# Patient Record
Sex: MALE | Race: WHITE | Employment: UNEMPLOYED | URBAN - METROPOLITAN AREA
[De-identification: names, ages, dates, MRNs, and addresses within clinical notes are randomized per-mention and may not be internally consistent; named-entity substitution may affect disease eponyms.]

---

## 2017-03-14 ENCOUNTER — HOSPITAL ENCOUNTER (EMERGENCY)
Age: 57
Discharge: HOME | End: 2017-03-14
Attending: EMERGENCY MEDICINE | Admitting: EMERGENCY MEDICINE
Payer: SELF-PAY

## 2017-03-14 ENCOUNTER — APPOINTMENT (OUTPATIENT)
Dept: GENERAL RADIOLOGY | Age: 57
End: 2017-03-14
Attending: STUDENT IN AN ORGANIZED HEALTH CARE EDUCATION/TRAINING PROGRAM
Payer: SELF-PAY

## 2017-03-14 VITALS
WEIGHT: 231 LBS | DIASTOLIC BLOOD PRESSURE: 95 MMHG | OXYGEN SATURATION: 94 % | SYSTOLIC BLOOD PRESSURE: 160 MMHG | TEMPERATURE: 97.8 F | HEIGHT: 72 IN | BODY MASS INDEX: 31.29 KG/M2 | RESPIRATION RATE: 16 BRPM | HEART RATE: 82 BPM

## 2017-03-14 DIAGNOSIS — S29.9XXA CHEST WALL INJURY, INITIAL ENCOUNTER: Primary | ICD-10-CM

## 2017-03-14 PROCEDURE — 99282 EMERGENCY DEPT VISIT SF MDM: CPT

## 2017-03-14 PROCEDURE — 71101 X-RAY EXAM UNILAT RIBS/CHEST: CPT

## 2017-03-14 RX ORDER — OXYCODONE AND ACETAMINOPHEN 5; 325 MG/1; MG/1
1-2 TABLET ORAL
Qty: 20 TAB | Refills: 0 | Status: SHIPPED | OUTPATIENT
Start: 2017-03-14 | End: 2017-09-05
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
## 2017-03-14 NOTE — ED NOTES
Pt given discharge instructions, patient education, prescriptions, and follow up information. Pt states understanding. All questions answered. Pt discharged to home in private vehicle, ambulatory. Pt A/Ox4, RA, pain controlled. Pt given IS, pt performed return demonstration properly.
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
## 2017-03-22 ENCOUNTER — APPOINTMENT (OUTPATIENT)
Dept: GENERAL RADIOLOGY | Age: 57
End: 2017-03-22
Attending: EMERGENCY MEDICINE
Payer: SELF-PAY

## 2017-03-22 ENCOUNTER — HOSPITAL ENCOUNTER (EMERGENCY)
Age: 57
Discharge: HOME | End: 2017-03-22
Attending: EMERGENCY MEDICINE | Admitting: EMERGENCY MEDICINE
Payer: SELF-PAY

## 2017-03-22 VITALS — OXYGEN SATURATION: 95 % | SYSTOLIC BLOOD PRESSURE: 131 MMHG | DIASTOLIC BLOOD PRESSURE: 71 MMHG

## 2017-03-22 DIAGNOSIS — S22.31XA CLOSED FRACTURE OF RIB OF RIGHT SIDE, INITIAL ENCOUNTER: Primary | ICD-10-CM

## 2017-03-22 PROCEDURE — 71020 XR CHEST PA LAT: CPT

## 2017-03-22 PROCEDURE — 99282 EMERGENCY DEPT VISIT SF MDM: CPT

## 2017-03-22 PROCEDURE — 94762 N-INVAS EAR/PLS OXIMTRY CONT: CPT
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
## 2017-03-22 NOTE — DISCHARGE INSTRUCTIONS
We hope that we have addressed all of your medical concerns. The examination and treatment you received in the Emergency Department were for an emergent problem and were not intended as complete care. It is important that you follow up with your healthcare provider(s) for ongoing care. If your symptoms worsen or do not improve as expected, and you are unable to reach your usual health care provider(s), you should return to the Emergency Department. Today's healthcare is undergoing tremendous change, and patient satisfaction surveys are one of the many tools to assess the quality of medical care. You may receive a survey from the [employer] regarding your experience in the Emergency Department. I hope that your experience has been completely positive, particularly the medical care that I provided. As such, please participate in the survey; anything less than excellent does not meet my expectations or intentions. [facility] and [address] [place] [address] participate in nationally recognized quality of care measures. If your blood pressure is greater than 120/80, as reported below, we urge that you seek medical care to address the potential of high blood pressure, commonly known as hypertension. Hypertension can be hereditary or can be caused by certain medical conditions, pain, stress, or \"white coat syndrome. \"       Please make an appointment with your health care provider(s) for follow up of your Emergency Department visit. VITALS:   No data found. Thank you for allowing us to provide you with medical care today. We realize that you have many choices for your emergency care needs. Please choose us in the future for any continued health care needs.       MD YSABEL Nassar [address]: [phone]            No results found for this or any previous visit (from the past 24 hour(s)). Xr Chest Pa Lat    Result Date: 3/22/2017  INDICATION: pain to right chest wall EXAM: CXR 2 View FINDINGS: Frontal and lateral views of the chest show slightly displaced fractures posteriorly of right ribs 4 and 5 more apparent probably because of displacement on the current study compared with 3/14/2017. Lungs show no contusion or infiltrate. Heart size is normal. There is no pulmonary edema. There is no evident pneumothorax, adenopathy or effusion. IMPRESSION: Right rib fractures.

## 2017-03-22 NOTE — ED NOTES
PT GIVEN COPY OF DISCHARGE INSTRUCTIONS, PT VERBALIZED UNDERSTANDING, QUESTIONS ANSWERED, DISCHARGED IN STABLE CONDITION

## 2017-08-26 ENCOUNTER — APPOINTMENT (OUTPATIENT)
Dept: GENERAL RADIOLOGY | Age: 57
End: 2017-08-26
Attending: PHYSICIAN ASSISTANT
Payer: SELF-PAY

## 2017-08-26 ENCOUNTER — HOSPITAL ENCOUNTER (EMERGENCY)
Age: 57
Discharge: HOME | End: 2017-08-26
Attending: EMERGENCY MEDICINE | Admitting: EMERGENCY MEDICINE
Payer: SELF-PAY

## 2017-08-26 VITALS
SYSTOLIC BLOOD PRESSURE: 127 MMHG | TEMPERATURE: 98.3 F | HEART RATE: 87 BPM | OXYGEN SATURATION: 91 % | RESPIRATION RATE: 18 BRPM | DIASTOLIC BLOOD PRESSURE: 71 MMHG

## 2017-08-26 DIAGNOSIS — S81.811A LEG LACERATION, RIGHT, INITIAL ENCOUNTER: Primary | ICD-10-CM

## 2017-08-26 PROCEDURE — 73590 X-RAY EXAM OF LOWER LEG: CPT

## 2017-08-26 PROCEDURE — 99282 EMERGENCY DEPT VISIT SF MDM: CPT

## 2017-08-26 RX ORDER — LIDOCAINE HYDROCHLORIDE 20 MG/ML
10 INJECTION, SOLUTION INFILTRATION; PERINEURAL
Status: DISCONTINUED | OUTPATIENT
Start: 2017-08-26 | End: 2017-08-26 | Stop reason: HOSPADM

## 2017-08-26 RX ORDER — BACITRACIN 500 UNIT/G
1 PACKET (EA) TOPICAL
Status: DISCONTINUED | OUTPATIENT
Start: 2017-08-26 | End: 2017-08-26 | Stop reason: HOSPADM

## 2017-08-26 RX ORDER — LIDOCAINE HYDROCHLORIDE AND EPINEPHRINE 20; 10 MG/ML; UG/ML
10 INJECTION, SOLUTION INFILTRATION; PERINEURAL
Status: DISCONTINUED | OUTPATIENT
Start: 2017-08-26 | End: 2017-08-26 | Stop reason: HOSPADM

## 2017-08-26 NOTE — ED NOTES
Pt d/c home, bacitracin, and wound dressing, cobain placed on pt prior to discharge. nad noted. Pt able to ambulate with no difficulties and verbalized understanding of d/c instructions.

## 2017-08-26 NOTE — ED PROVIDER NOTES
HPI Comments: 60 y.o. male with no significant past medical history who presents from home via bike with chief complaint of laceration. Pt reports awaking shortly prior to arrival after having a nightmare at which time he ambulated and fell on to a piece of glass. Pt sustained laceration to his right shin. He applied pressure and wrapped site but was unable to control the bleeding. Pt was indoors at time of injury and assumed glass was a picture frame. Pt denies the use of blood thinners. NKDA. No fever, cough, congestion, nausea, vomiting, diarrhea, constipation, chest pain, SOB or any other acute medical concerns at this time. PCP: Michelle Jimenez MD    Note written by Mara Anderson, as dictated by JUDIE Davis  4:05 AM    The history is provided by the patient. No  was used. Past Medical History:   Diagnosis Date    Knee pain        Past Surgical History:   Procedure Laterality Date    HX GI      hernia repair    HX HEENT      tonsils removed    HX ORTHOPAEDIC      right knee surgery         History reviewed. No pertinent family history. Social History     Social History    Marital status: SINGLE     Spouse name: N/A    Number of children: N/A    Years of education: N/A     Occupational History    Not on file. Social History Main Topics    Smoking status: Current Every Day Smoker     Packs/day: 0.50    Smokeless tobacco: Not on file    Alcohol use Yes      Comment: occ    Drug use: No    Sexual activity: Not on file     Other Topics Concern    Not on file     Social History Narrative         ALLERGIES: Review of patient's allergies indicates no known allergies. Review of Systems   Constitutional: Negative for fever. HENT: Negative for congestion and sore throat. Eyes: Negative for photophobia. Respiratory: Negative for cough and shortness of breath. Cardiovascular: Negative for chest pain and leg swelling.    Gastrointestinal: Negative for abdominal pain, constipation, diarrhea, nausea and vomiting. Genitourinary: Negative for difficulty urinating, dysuria and hematuria. Musculoskeletal: Negative for back pain and neck pain. Skin: Positive for wound (right shin). Negative for rash. Neurological: Negative for dizziness, weakness, numbness and headaches. All other systems reviewed and are negative. Vitals:    08/26/17 0342   BP: 128/76   Pulse: 87   Resp: 18   Temp: 98.3 °F (36.8 °C)   SpO2: 99%            Physical Exam   Constitutional: He is oriented to person, place, and time. He appears well-developed and well-nourished. No distress. Pleasant  male in NAD   HENT:   Head: Normocephalic and atraumatic. Right Ear: External ear normal.   Left Ear: External ear normal.   Nose: Nose normal.   Mouth/Throat: Oropharynx is clear and moist. No oropharyngeal exudate. Eyes: Conjunctivae and EOM are normal. Pupils are equal, round, and reactive to light. Right eye exhibits no discharge. Left eye exhibits no discharge. Neck: Normal range of motion. Neck supple. Cardiovascular: Normal rate, regular rhythm and normal heart sounds. Pulmonary/Chest: Effort normal and breath sounds normal. He has no wheezes. He has no rales. Abdominal: Soft. Bowel sounds are normal. He exhibits no distension. There is no tenderness. There is no guarding. Musculoskeletal: Normal range of motion. Legs:  Lymphadenopathy:     He has no cervical adenopathy. Neurological: He is alert and oriented to person, place, and time. No cranial nerve deficit. Skin: Skin is warm and dry. He is not diaphoretic. Psychiatric: He has a normal mood and affect. His behavior is normal.   Nursing note and vitals reviewed. MDM  Number of Diagnoses or Management Options  Diagnosis management comments: 61 yo  male with leg laceration  Plan  Wound repair.  Robyn ARITA [place], [place]         Amount and/or Complexity of Data Reviewed  Tests in the radiology section of CPT®: ordered and reviewed  Independent visualization of images, tracings, or specimens: yes      ED Course       Procedures           Progress note    Imaging reviewed. Claudia Mo    Patient's results have been reviewed with them. Patient and/or family have verbally conveyed their understanding and agreement of the patient's signs, symptoms, diagnosis, treatment and prognosis and additionally agree to follow up as recommended or return to the Emergency Room should their condition change prior to follow-up. Discharge instructions have also been provided to the patient with some educational information regarding their diagnosis as well a list of reasons why they would want to return to the ER prior to their follow-up appointment should their condition change. Claudia Mo    A/P  Leg laceration: APPLY ICE FOR PAIN/SWELLING. APPLY ANTIBIOTIC OINTMENT 2-3 X DAY. REMOVAL IN 10 DAYS. FOLLOW UP IF ANY REDNESS/SWELLING/DRAINAGE OR SIGNS OF INFECTION.  Claudia Huynh

## 2017-08-26 NOTE — DISCHARGE INSTRUCTIONS
We hope that we have addressed all of your medical concerns. The examination and treatment you received in the Emergency Department were for an emergent problem and were not intended as complete care. It is important that you follow up with your healthcare provider(s) for ongoing care. If your symptoms worsen or do not improve as expected, and you are unable to reach your usual health care provider(s), you should return to the Emergency Department. Today's healthcare is undergoing tremendous change, and patient satisfaction surveys are one of the many tools to assess the quality of medical care. You may receive a survey from the [employer] organization regarding your experience in the Emergency Department. I hope that your experience has been completely positive, particularly the medical care that I provided. As such, please participate in the survey; anything less than excellent does not meet my expectations or intentions. [facility] and [facility] participate in nationally recognized quality of care measures. If your blood pressure is greater than 120/80, as reported below, we urge that you seek medical care to address the potential of high blood pressure, commonly known as hypertension. Hypertension can be hereditary or can be caused by certain medical conditions, pain, stress, or \"white coat syndrome. \"       Please make an appointment with your health care provider(s) for follow up of your Emergency Department visit. VITALS:   Patient Vitals for the past 8 hrs:   Temp Pulse Resp BP SpO2   08/26/17 0342 98.3 °F (36.8 °C) 87 18 128/76 99 %          Thank you for allowing us to provide you with medical care today. We realize that you have many choices for your emergency care needs. Please choose us in the future for any continued health care needs. Regards,           April NENO Barrera, [facility]. Office: [phone]            No results found for this or any previous visit (from the past 24 hour(s)). Xr Tib/fib Rt    Result Date: 8/26/2017  EXAM:  XR TIB/FIB RT INDICATION:  Right lower leg laceration. Evaluate for radiopaque foreign body. COMPARISON: Right knee radiographs 12/21/2016. FINDINGS: AP and lateral  views of the right tibia and fibula demonstrate no fracture or other acute osseous, articular or soft tissue abnormality. There is no radiopaque foreign body. IMPRESSION: No radiopaque foreign body or other acute abnormality. Cuts: Care Instructions  Your Care Instructions  A cut can happen anywhere on your body. Stitches, staples, skin adhesives, or pieces of tape called Steri-Strips are sometimes used to keep the edges of a cut together and help it heal. Steri-Strips can be used by themselves or with stitches or staples. Sometimes cuts are left open. If the cut went deep and through the skin, the doctor may have closed the cut in two layers. A deeper layer of stitches brings the deep part of the cut together. These stitches will dissolve and don't need to be removed. The upper layer closure, which could be stitches, staples, Steri-Strips, or adhesive, is what you see on the cut. A cut is often covered by a bandage. The doctor has checked you carefully, but problems can develop later. If you notice any problems or new symptoms, get medical treatment right away. Follow-up care is a key part of your treatment and safety. Be sure to make and go to all appointments, and call your doctor if you are having problems. It's also a good idea to know your test results and keep a list of the medicines you take. How can you care for yourself at home? If a cut is open or closed  · Prop up the sore area on a pillow anytime you sit or lie down during the next 3 days. Try to keep it above the level of your heart. This will help reduce swelling.   · Keep the cut dry for the first 24 to 48 hours. After this, you can shower if your doctor okays it. Pat the cut dry. · Don't soak the cut, such as in a bathtub. Your doctor will tell you when it's safe to get the cut wet. · After the first 24 to 48 hours, clean the cut with soap and water 2 times a day unless your doctor gives you different instructions. ¨ Don't use hydrogen peroxide or alcohol, which can slow healing. ¨ You may cover the cut with a thin layer of petroleum jelly and a nonstick bandage. ¨ If the doctor put a bandage over the cut, put on a new bandage after cleaning the cut or if the bandage gets wet or dirty. · Avoid any activity that could cause your cut to reopen. · Be safe with medicines. Read and follow all instructions on the label. ¨ If the doctor gave you a prescription medicine for pain, take it as prescribed. ¨ If you are not taking a prescription pain medicine, ask your doctor if you can take an over-the-counter medicine. If the cut is closed with stitches, staples, or Steri-Strips  · Follow the above instructions for open or closed cuts. · Do not remove the stitches or staples on your own. Your doctor will tell you when to come back to have the stitches or staples removed. · Leave Steri-Strips on until they fall off. If the cut is closed with a skin adhesive  · Follow the above instructions for open or closed cuts. · Leave the skin adhesive on your skin until it falls off on its own. This may take 5 to 10 days. · Do not scratch, rub, or pick at the adhesive. · Do not put the sticky part of a bandage directly on the adhesive. · Do not put any kind of ointment, cream, or lotion over the area. This can make the adhesive fall off too soon. Do not use hydrogen peroxide or alcohol, which can slow healing. When should you call for help? Call your doctor now or seek immediate medical care if:  · You have new pain, or your pain gets worse. · The skin near the cut is cold or pale or changes color.   · You have tingling, weakness, or numbness near the cut. · The cut starts to bleed, and blood soaks through the bandage. Oozing small amounts of blood is normal.  · You have trouble moving the area near the cut. · You have symptoms of infection, such as:  ¨ Increased pain, swelling, warmth, or redness around the cut. ¨ Red streaks leading from the cut. ¨ Pus draining from the cut. ¨ A fever. Watch closely for changes in your health, and be sure to contact your doctor if:  · The cut reopens. · You do not get better as expected. Where can you learn more? Go to http://leonela-isaiah.info/. Enter M735 in the search box to learn more about \"Cuts: Care Instructions. \"  Current as of: March 20, 2017  Content Version: 11.3  © 4383-3278 [employer]. Care instructions adapted under license by [employer] (which disclaims liability or warranty for this information). If you have questions about a medical condition or this instruction, always ask your healthcare professional. [place] [address] any warranty or liability for your use of this information.

## 2017-08-26 NOTE — ED TRIAGE NOTES
Triage: Patient reports sleep walking during nightmare and falling and cutting right leg on glass. Last tetanus 1 year ago approx. Bleeding controlled upon triage with duct tape and gauze. Not anticoagulated.

## 2017-09-05 ENCOUNTER — HOSPITAL ENCOUNTER (EMERGENCY)
Age: 57
Discharge: HOME | End: 2017-09-05
Attending: EMERGENCY MEDICINE | Admitting: EMERGENCY MEDICINE
Payer: SELF-PAY

## 2017-09-05 VITALS
BODY MASS INDEX: 30.51 KG/M2 | RESPIRATION RATE: 16 BRPM | DIASTOLIC BLOOD PRESSURE: 90 MMHG | HEIGHT: 72 IN | TEMPERATURE: 98 F | SYSTOLIC BLOOD PRESSURE: 140 MMHG | WEIGHT: 225.25 LBS | HEART RATE: 86 BPM

## 2017-09-05 DIAGNOSIS — Z48.02 VISIT FOR SUTURE REMOVAL: Primary | ICD-10-CM

## 2017-09-05 PROCEDURE — 74011000250 HC RX REV CODE- 250: Performed by: PHYSICIAN ASSISTANT

## 2017-09-05 PROCEDURE — 75810000275 HC EMERGENCY DEPT VISIT NO LEVEL OF CARE

## 2017-09-05 RX ORDER — BACITRACIN 500 UNIT/G
1 PACKET (EA) TOPICAL ONCE
Status: COMPLETED | OUTPATIENT
Start: 2017-09-05 | End: 2017-09-05

## 2017-09-05 RX ORDER — BACITRACIN 500 UNIT/G
1 PACKET (EA) TOPICAL 3 TIMES DAILY
Status: DISCONTINUED | OUTPATIENT
Start: 2017-09-05 | End: 2017-09-05

## 2017-09-05 RX ADMIN — BACITRACIN 1 PACKET: 500 OINTMENT TOPICAL at 01:40

## 2017-09-05 NOTE — ED NOTES
Bacitracin and Band Aid applied. Pt provided with discharge instructions. Verbalizes understanding.  Left ambulatory with steady gait, all belongings, and stable VS.

## 2017-09-05 NOTE — DISCHARGE INSTRUCTIONS
Learning About Stitches and Staples Removal  When are stitches and staples removed? Your doctor will tell you when to have your stitches or staples removed, usually in 7 to 14 days. How long you'll be told to wait will depend on things like where the wound is located, how big and how deep the wound is, and what your general health is like. Do not remove the stitches on your own. Stitches on the face are usually removed within a week. But stitches and staples on other areas of the body, such as on the back or belly or over a joint, may need to stay in place longer, often a week or two. Be sure to follow your doctor's instructions. How are stitches and staples removed? It usually doesn't hurt when the doctor removes the stitches or staples. You may feel a tug as each stitch or staple is removed. · You will either be seated or lying down. · To remove stitches, the doctor will use scissors to cut each of the knots and then pull the threads out. · To remove staples, the doctor will use a tool to take out the staples one at a time. · The area may still feel tender after the stitches or staples are gone. But it should feel better within a few minutes or up to a few hours. What can you expect after stitches and staples are removed? Depending on the type and location of the cut, you will have a scar. Scars usually fade over time. Keep the area clean, but you won't need a bandage. When should you call for help? Call your doctor now or seek immediate medical care if:  · You have new pain, or your pain gets worse. · You have trouble moving the area near the scar. · You have symptoms of infection, such as:  ¨ Increased pain, swelling, warmth, or redness around the scar. ¨ Red streaks leading from the scar. ¨ Pus draining from the scar. ¨ A fever. Watch closely for changes in your health, and be sure to contact your doctor if:  · The scar opens. · You do not get better as expected.   Follow-up care is a key part of your treatment and safety. Be sure to make and go to all appointments, and call your doctor if you do not get better as expected. It's also a good idea to keep a list of the medicines you take. Where can you learn more? Go to http://leonela-isaiah.info/. Enter Z141 in the search box to learn more about \"Learning About Stitches and Staples Removal.\"  Current as of: March 20, 2017  Content Version: 11.3  © 1870-5946 [employer]. Care instructions adapted under license by [employer] (which disclaims liability or warranty for this information). If you have questions about a medical condition or this instruction, always ask your healthcare professional. [address] any warranty or liability for your use of this information. We hope that we have addressed all of your medical concerns. The examination and treatment you received in the Emergency Department were for an emergent problem and were not intended as complete care. It is important that you follow up with your healthcare provider(s) for ongoing care. If your symptoms worsen or do not improve as expected, and you are unable to reach your usual health care provider(s), you should return to the Emergency Department. Today's healthcare is undergoing tremendous change, and patient satisfaction surveys are one of the many tools to assess the quality of medical care. You may receive a survey from the [employer] regarding your experience in the Emergency Department. I hope that your experience has been completely positive, particularly the medical care that I provided. As such, please participate in the survey; anything less than excellent does not meet my expectations or intentions. [facility] and [facility] participate in nationally recognized quality of care measures.   If your blood pressure is greater than 120/80, as reported below, we urge that you seek medical care to address the potential of high blood pressure, commonly known as hypertension. Hypertension can be hereditary or can be caused by certain medical conditions, pain, stress, or \"white coat syndrome. \"       Please make an appointment with your health care provider(s) for follow up of your Emergency Department visit. VITALS:   Patient Vitals for the past 8 hrs:   Temp Pulse Resp BP   09/05/17 0111 98 °F (36.7 °C) 86 16 140/90          Thank you for allowing us to provide you with medical care today. We realize that you have many choices for your emergency care needs. Please choose us in the future for any continued health care needs. Delbert Barrett, [address] [place] [address].   Office: [phone]

## 2017-09-05 NOTE — ED PROVIDER NOTES
HPI Comments: 59 yo male here for suture removal.  States had sutures placed in ER 8/26; site healing well without complications. Denies drainage. Denies fever, chills, CP, SOB, abd pain, flank pain, urinary symptoms. +Smoker. Patient is a 60 y.o. male presenting with suture removal. The history is provided by the patient. Suture Removal   This is a new problem. Pertinent negatives include no chest pain, no abdominal pain, no headaches and no shortness of breath. Nothing aggravates the symptoms. He has tried nothing for the symptoms. Past Medical History:   Diagnosis Date    Knee pain        Past Surgical History:   Procedure Laterality Date    HX GI      hernia repair    HX HEENT      tonsils removed    HX ORTHOPAEDIC      right knee surgery         History reviewed. No pertinent family history. Social History     Social History    Marital status: SINGLE     Spouse name: N/A    Number of children: N/A    Years of education: N/A     Occupational History    Not on file. Social History Main Topics    Smoking status: Current Every Day Smoker     Packs/day: 0.50    Smokeless tobacco: Not on file    Alcohol use Yes      Comment: occ    Drug use: No    Sexual activity: Not on file     Other Topics Concern    Not on file     Social History Narrative         ALLERGIES: Review of patient's allergies indicates no known allergies. Review of Systems   Constitutional: Negative for activity change and fever. HENT: Negative for facial swelling. Eyes: Negative for discharge. Respiratory: Negative for cough and shortness of breath. Cardiovascular: Negative for chest pain and leg swelling. Gastrointestinal: Negative for abdominal distention and abdominal pain. Skin: Positive for wound. Negative for color change. Neurological: Negative for seizures, syncope and headaches. Psychiatric/Behavioral: Negative for behavioral problems.        Vitals:    09/05/17 0111   BP: 140/90 Pulse: 86   Resp: 16   Temp: 98 °F (36.7 °C)   Weight: 102.2 kg (225 lb 4 oz)   Height: 6' (1.829 m)            Physical Exam   Constitutional: He is oriented to person, place, and time. He appears well-nourished. No distress. HENT:   Head: Normocephalic and atraumatic. Eyes: Pupils are equal, round, and reactive to light. Neck: Normal range of motion. Cardiovascular: Normal rate and regular rhythm. Pulmonary/Chest: Effort normal and breath sounds normal.   Abdominal: Soft. There is no tenderness. Musculoskeletal: Normal range of motion. He exhibits no edema or tenderness. Neurological: He is alert and oriented to person, place, and time. Skin: Skin is warm and dry. Well healing wound to right lower leg; 10 sutures in place; no surrounding edema/erythema. Psychiatric: He has a normal mood and affect. Nursing note and vitals reviewed. [facility]  ED Course       Suture/Staple Removal  Date/Time: 9/5/2017 1:20 AM  Performed by: Criss Aguilera  Authorized by: Criss Aguilera     Consent:     Consent obtained:  Verbal    Consent given by:  Patient    Risks discussed:  Pain, wound separation and bleeding    Alternatives discussed:  No treatment and delayed treatment  Location:     Location:  Lower extremity    Lower extremity location:  Leg    Leg location:  R lower leg  Procedure details:     Wound appearance:  Good wound healing, clean, nontender and nonpurulent    Number of sutures removed:  10  Post-procedure details:     Post-removal:  Antibiotic ointment applied and dressing applied    Patient tolerance of procedure: Tolerated well, no immediate complications        Patient's results have been reviewed with them. Patient and/or family have verbally conveyed their understanding and agreement of the patient's signs, symptoms, diagnosis, treatment and prognosis and additionally agree to follow up as recommended or return to the Emergency Room should their condition change prior to follow-up. Discharge instructions have also been provided to the patient with some educational information regarding their diagnosis as well a list of reasons why they would want to return to the ER prior to their follow-up appointment should their condition change.   JUDIE Ahn

## 2019-09-23 ENCOUNTER — HOSPICE ADMISSION (OUTPATIENT)
Dept: HOSPICE | Facility: HOSPICE | Age: 59
End: 2019-09-23

## 2019-11-12 ENCOUNTER — APPOINTMENT (OUTPATIENT)
Dept: CT IMAGING | Age: 59
DRG: 720 | End: 2019-11-12
Attending: EMERGENCY MEDICINE
Payer: MEDICAID

## 2019-11-12 ENCOUNTER — APPOINTMENT (OUTPATIENT)
Dept: GENERAL RADIOLOGY | Age: 59
DRG: 720 | End: 2019-11-12
Attending: EMERGENCY MEDICINE
Payer: MEDICAID

## 2019-11-12 ENCOUNTER — APPOINTMENT (OUTPATIENT)
Dept: ULTRASOUND IMAGING | Age: 59
DRG: 720 | End: 2019-11-12
Attending: FAMILY MEDICINE
Payer: MEDICAID

## 2019-11-12 ENCOUNTER — APPOINTMENT (OUTPATIENT)
Dept: CT IMAGING | Age: 59
DRG: 720 | End: 2019-11-12
Attending: FAMILY MEDICINE
Payer: MEDICAID

## 2019-11-12 ENCOUNTER — HOSPITAL ENCOUNTER (INPATIENT)
Age: 59
LOS: 11 days | Discharge: HOME | DRG: 720 | End: 2019-11-23
Attending: EMERGENCY MEDICINE | Admitting: FAMILY MEDICINE
Payer: MEDICAID

## 2019-11-12 DIAGNOSIS — I50.9 ACUTE CONGESTIVE HEART FAILURE, UNSPECIFIED HEART FAILURE TYPE (HCC): ICD-10-CM

## 2019-11-12 DIAGNOSIS — K75.0 LIVER ABSCESS: ICD-10-CM

## 2019-11-12 DIAGNOSIS — E87.1 HYPONATREMIA: Primary | ICD-10-CM

## 2019-11-12 DIAGNOSIS — R50.9 FEBRILE ILLNESS, ACUTE: ICD-10-CM

## 2019-11-12 LAB
ALBUMIN SERPL-MCNC: 2.6 G/DL (ref 3.5–5)
ALBUMIN/GLOB SERPL: 0.6 {RATIO} (ref 1.1–2.2)
ALP SERPL-CCNC: 161 U/L (ref 45–117)
ALT SERPL-CCNC: 147 U/L (ref 12–78)
ANION GAP SERPL CALC-SCNC: 6 MMOL/L (ref 5–15)
APAP SERPL-MCNC: 7 UG/ML (ref 10–30)
APPEARANCE UR: ABNORMAL
AST SERPL-CCNC: 117 U/L (ref 15–37)
BACTERIA URNS QL MICRO: NEGATIVE /HPF
BASOPHILS # BLD: 0 K/UL (ref 0–0.1)
BASOPHILS NFR BLD: 0 % (ref 0–1)
BILIRUB SERPL-MCNC: 1 MG/DL (ref 0.2–1)
BILIRUB UR QL CFM: NEGATIVE
BNP SERPL-MCNC: 1298 PG/ML
BUN SERPL-MCNC: 10 MG/DL (ref 6–20)
BUN/CREAT SERPL: 11 (ref 12–20)
CALCIUM SERPL-MCNC: 8 MG/DL (ref 8.5–10.1)
CHLORIDE SERPL-SCNC: 93 MMOL/L (ref 97–108)
CO2 SERPL-SCNC: 28 MMOL/L (ref 21–32)
COLOR UR: ABNORMAL
COMMENT, HOLDF: NORMAL
CREAT SERPL-MCNC: 0.87 MG/DL (ref 0.7–1.3)
DIFFERENTIAL METHOD BLD: ABNORMAL
EOSINOPHIL # BLD: 0 K/UL (ref 0–0.4)
EOSINOPHIL NFR BLD: 0 % (ref 0–7)
EPITH CASTS URNS QL MICRO: ABNORMAL /LPF
ERYTHROCYTE [DISTWIDTH] IN BLOOD BY AUTOMATED COUNT: 12.5 % (ref 11.5–14.5)
FLUAV AG NPH QL IA: NEGATIVE
FLUBV AG NOSE QL IA: NEGATIVE
GLOBULIN SER CALC-MCNC: 4.1 G/DL (ref 2–4)
GLUCOSE SERPL-MCNC: 136 MG/DL (ref 65–100)
GLUCOSE UR STRIP.AUTO-MCNC: 100 MG/DL
HAV IGM SER QL: NONREACTIVE
HBV CORE IGM SER QL: NONREACTIVE
HBV SURFACE AG SER QL: 0.52 INDEX
HBV SURFACE AG SER QL: NEGATIVE
HCT VFR BLD AUTO: 43 % (ref 36.6–50.3)
HCV AB SERPL QL IA: NONREACTIVE
HCV COMMENT,HCGAC: NORMAL
HGB BLD-MCNC: 14.6 G/DL (ref 12.1–17)
HGB UR QL STRIP: ABNORMAL
IMM GRANULOCYTES # BLD AUTO: 0.3 K/UL (ref 0–0.04)
IMM GRANULOCYTES NFR BLD AUTO: 2 % (ref 0–0.5)
INR PPP: 1.3 (ref 0.9–1.1)
KETONES UR QL STRIP.AUTO: ABNORMAL MG/DL
LACTATE BLD-SCNC: 1.42 MMOL/L (ref 0.4–2)
LEUKOCYTE ESTERASE UR QL STRIP.AUTO: NEGATIVE
LIPASE SERPL-CCNC: 85 U/L (ref 73–393)
LYMPHOCYTES # BLD: 0.5 K/UL (ref 0.8–3.5)
LYMPHOCYTES NFR BLD: 3 % (ref 12–49)
MCH RBC QN AUTO: 30.2 PG (ref 26–34)
MCHC RBC AUTO-ENTMCNC: 34 G/DL (ref 30–36.5)
MCV RBC AUTO: 89 FL (ref 80–99)
MONOCYTES # BLD: 1.3 K/UL (ref 0–1)
MONOCYTES NFR BLD: 8 % (ref 5–13)
NEUTS SEG # BLD: 14.5 K/UL (ref 1.8–8)
NEUTS SEG NFR BLD: 87 % (ref 32–75)
NITRITE UR QL STRIP.AUTO: NEGATIVE
NRBC # BLD: 0 K/UL (ref 0–0.01)
NRBC BLD-RTO: 0 PER 100 WBC
OSMOLALITY SERPL: 272 MOSM/KG H2O
PH UR STRIP: 6.5 [PH] (ref 5–8)
PLATELET # BLD AUTO: 161 K/UL (ref 150–400)
PMV BLD AUTO: 9.8 FL (ref 8.9–12.9)
POTASSIUM SERPL-SCNC: 3.4 MMOL/L (ref 3.5–5.1)
PROT SERPL-MCNC: 6.7 G/DL (ref 6.4–8.2)
PROT UR STRIP-MCNC: 100 MG/DL
PROTHROMBIN TIME: 12.9 SEC (ref 9–11.1)
RBC # BLD AUTO: 4.83 M/UL (ref 4.1–5.7)
RBC #/AREA URNS HPF: ABNORMAL /HPF (ref 0–5)
RBC MORPH BLD: ABNORMAL
SAMPLES BEING HELD,HOLD: NORMAL
SODIUM SERPL-SCNC: 127 MMOL/L (ref 136–145)
SP GR UR REFRACTOMETRY: 1.02 (ref 1–1.03)
SP1: NORMAL
SP2: NORMAL
SP3: NORMAL
T3FREE SERPL-MCNC: 1.3 PG/ML (ref 2.2–4)
T4 FREE SERPL-MCNC: 1.4 NG/DL (ref 0.8–1.5)
TROPONIN I SERPL-MCNC: <0.05 NG/ML
TROPONIN I SERPL-MCNC: <0.05 NG/ML
TSH SERPL DL<=0.05 MIU/L-ACNC: 1.16 UIU/ML (ref 0.36–3.74)
UR CULT HOLD, URHOLD: NORMAL
UROBILINOGEN UR QL STRIP.AUTO: 4 EU/DL (ref 0.2–1)
WBC # BLD AUTO: 16.6 K/UL (ref 4.1–11.1)
WBC URNS QL MICRO: ABNORMAL /HPF (ref 0–4)

## 2019-11-12 PROCEDURE — 80074 ACUTE HEPATITIS PANEL: CPT

## 2019-11-12 PROCEDURE — 74011250636 HC RX REV CODE- 250/636: Performed by: EMERGENCY MEDICINE

## 2019-11-12 PROCEDURE — 71250 CT THORAX DX C-: CPT

## 2019-11-12 PROCEDURE — 36415 COLL VENOUS BLD VENIPUNCTURE: CPT

## 2019-11-12 PROCEDURE — 96361 HYDRATE IV INFUSION ADD-ON: CPT

## 2019-11-12 PROCEDURE — 83605 ASSAY OF LACTIC ACID: CPT

## 2019-11-12 PROCEDURE — 81001 URINALYSIS AUTO W/SCOPE: CPT

## 2019-11-12 PROCEDURE — 84443 ASSAY THYROID STIM HORMONE: CPT

## 2019-11-12 PROCEDURE — 74176 CT ABD & PELVIS W/O CONTRAST: CPT

## 2019-11-12 PROCEDURE — 85025 COMPLETE CBC W/AUTO DIFF WBC: CPT

## 2019-11-12 PROCEDURE — 76705 ECHO EXAM OF ABDOMEN: CPT

## 2019-11-12 PROCEDURE — 80053 COMPREHEN METABOLIC PANEL: CPT

## 2019-11-12 PROCEDURE — 84481 FREE ASSAY (FT-3): CPT

## 2019-11-12 PROCEDURE — 83690 ASSAY OF LIPASE: CPT

## 2019-11-12 PROCEDURE — 87076 CULTURE ANAEROBE IDENT EACH: CPT

## 2019-11-12 PROCEDURE — 99285 EMERGENCY DEPT VISIT HI MDM: CPT

## 2019-11-12 PROCEDURE — 87040 BLOOD CULTURE FOR BACTERIA: CPT

## 2019-11-12 PROCEDURE — 87804 INFLUENZA ASSAY W/OPTIC: CPT

## 2019-11-12 PROCEDURE — 71046 X-RAY EXAM CHEST 2 VIEWS: CPT

## 2019-11-12 PROCEDURE — 74011250636 HC RX REV CODE- 250/636: Performed by: FAMILY MEDICINE

## 2019-11-12 PROCEDURE — 83880 ASSAY OF NATRIURETIC PEPTIDE: CPT

## 2019-11-12 PROCEDURE — 83930 ASSAY OF BLOOD OSMOLALITY: CPT

## 2019-11-12 PROCEDURE — 87449 NOS EACH ORGANISM AG IA: CPT

## 2019-11-12 PROCEDURE — 96360 HYDRATION IV INFUSION INIT: CPT

## 2019-11-12 PROCEDURE — 65660000000 HC RM CCU STEPDOWN

## 2019-11-12 PROCEDURE — 84484 ASSAY OF TROPONIN QUANT: CPT

## 2019-11-12 PROCEDURE — 84439 ASSAY OF FREE THYROXINE: CPT

## 2019-11-12 PROCEDURE — 80307 DRUG TEST PRSMV CHEM ANLYZR: CPT

## 2019-11-12 PROCEDURE — 74011250637 HC RX REV CODE- 250/637: Performed by: EMERGENCY MEDICINE

## 2019-11-12 PROCEDURE — 85610 PROTHROMBIN TIME: CPT

## 2019-11-12 RX ORDER — SODIUM CHLORIDE 0.9 % (FLUSH) 0.9 %
5-40 SYRINGE (ML) INJECTION AS NEEDED
Status: DISCONTINUED | OUTPATIENT
Start: 2019-11-12 | End: 2019-11-23 | Stop reason: HOSPADM

## 2019-11-12 RX ORDER — ACETAMINOPHEN 325 MG/1
650 TABLET ORAL
Status: CANCELLED | OUTPATIENT
Start: 2019-11-12

## 2019-11-12 RX ORDER — POTASSIUM CHLORIDE 14.9 MG/ML
10 INJECTION INTRAVENOUS ONCE
Status: COMPLETED | OUTPATIENT
Start: 2019-11-12 | End: 2019-11-12

## 2019-11-12 RX ORDER — SODIUM CHLORIDE 9 MG/ML
75 INJECTION, SOLUTION INTRAVENOUS CONTINUOUS
Status: DISCONTINUED | OUTPATIENT
Start: 2019-11-12 | End: 2019-11-15

## 2019-11-12 RX ORDER — SODIUM CHLORIDE 0.9 % (FLUSH) 0.9 %
5-40 SYRINGE (ML) INJECTION EVERY 8 HOURS
Status: DISCONTINUED | OUTPATIENT
Start: 2019-11-12 | End: 2019-11-23 | Stop reason: HOSPADM

## 2019-11-12 RX ORDER — HEPARIN SODIUM 5000 [USP'U]/ML
5000 INJECTION, SOLUTION INTRAVENOUS; SUBCUTANEOUS EVERY 8 HOURS
Status: DISCONTINUED | OUTPATIENT
Start: 2019-11-12 | End: 2019-11-13

## 2019-11-12 RX ORDER — ACETAMINOPHEN 500 MG
1000 TABLET ORAL ONCE
Status: COMPLETED | OUTPATIENT
Start: 2019-11-12 | End: 2019-11-12

## 2019-11-12 RX ADMIN — SODIUM CHLORIDE 1000 ML: 900 INJECTION, SOLUTION INTRAVENOUS at 13:10

## 2019-11-12 RX ADMIN — POTASSIUM CHLORIDE 10 MEQ: 200 INJECTION, SOLUTION INTRAVENOUS at 18:53

## 2019-11-12 RX ADMIN — SODIUM CHLORIDE 75 ML/HR: 900 INJECTION, SOLUTION INTRAVENOUS at 18:53

## 2019-11-12 RX ADMIN — ACETAMINOPHEN 1000 MG: 500 TABLET ORAL at 14:25

## 2019-11-12 RX ADMIN — HEPARIN SODIUM 5000 UNITS: 5000 INJECTION INTRAVENOUS; SUBCUTANEOUS at 18:53

## 2019-11-12 RX ADMIN — SODIUM CHLORIDE 1000 ML: 900 INJECTION, SOLUTION INTRAVENOUS at 16:04

## 2019-11-12 NOTE — ED PROVIDER NOTES
62 y.o. male with no significant past medical history who presents via EMS with chief complaint of diarrhea. Pt c/o diarrhea and headache since Thursday at 2300 with accompanying epigastric tenderness, and productive cough. He reports that since onset he has been unable to eat or drink anything. He reports that even when he drinks water it comes back up. He has not had a BM in the past two days but associates that with reduced PO intake. He stats he tried to take alkaseltzer plus with no relief. Pt has not had a flu vaccine yet this season. Pt is unaware if he was having fevers or not previously. There are no other acute medical concerns at this time. Social hx: endorses tobacco use and EtOH, both \"occasionally\"    PCP: Theron Muniz MD      Note written by Mara Goncalves, as dictated by Siddhartha Dudley MD 1:05 PM      The history is provided by the patient. No  was used. Past Medical History:   Diagnosis Date    Knee pain        Past Surgical History:   Procedure Laterality Date    HX GI      hernia repair    HX HEENT      tonsils removed    HX ORTHOPAEDIC      right knee surgery         History reviewed. No pertinent family history.     Social History     Socioeconomic History    Marital status: SINGLE     Spouse name: Not on file    Number of children: Not on file    Years of education: Not on file    Highest education level: Not on file   Occupational History    Not on file   Social Needs    Financial resource strain: Not on file    Food insecurity:     Worry: Not on file     Inability: Not on file    Transportation needs:     Medical: Not on file     Non-medical: Not on file   Tobacco Use    Smoking status: Current Every Day Smoker     Packs/day: 0.50    Smokeless tobacco: Never Used   Substance and Sexual Activity    Alcohol use: Yes     Comment: occ    Drug use: No    Sexual activity: Not on file   Lifestyle    Physical activity:     Days per week: Not on file     Minutes per session: Not on file    Stress: Not on file   Relationships    Social connections:     Talks on phone: Not on file     Gets together: Not on file     Attends [employer] service: Not on file     Active member of club or organization: Not on file     Attends meetings of clubs or organizations: Not on file     Relationship status: Not on file    Intimate partner violence:     Fear of current or ex partner: Not on file     Emotionally abused: Not on file     Physically abused: Not on file     Forced sexual activity: Not on file   Other Topics Concern    Not on file   Social History Narrative    Not on file         ALLERGIES: Patient has no known allergies. Review of Systems   Constitutional: Positive for appetite change (decrease) and fever. Negative for activity change and chills. HENT: Negative for nosebleeds, sore throat, trouble swallowing and voice change. Eyes: Negative for visual disturbance. Respiratory: Positive for cough. Negative for shortness of breath. Cardiovascular: Negative for chest pain and palpitations. Gastrointestinal: Positive for abdominal pain, diarrhea and vomiting. Negative for constipation and nausea. Genitourinary: Negative for difficulty urinating, dysuria, hematuria and urgency. Musculoskeletal: Negative for back pain, neck pain and neck stiffness. Skin: Negative for color change. Allergic/Immunologic: Negative for immunocompromised state. Neurological: Positive for headaches. Negative for dizziness, seizures, syncope, weakness, light-headedness and numbness. Psychiatric/Behavioral: Negative for behavioral problems, confusion, hallucinations, self-injury and suicidal ideas. All other systems reviewed and are negative.       Vitals:    11/12/19 1234   BP: 131/88   Pulse: (!) 121   Resp: 18   Temp: (!) 101 °F (38.3 °C)   SpO2: 94%   Weight: 108.9 kg (240 lb)            Physical Exam   Constitutional: He is oriented to person, place, and time. He appears well-developed and well-nourished. No distress. HENT:   Head: Normocephalic and atraumatic. Eyes: Pupils are equal, round, and reactive to light. Neck: Normal range of motion. Neck supple. Cardiovascular: Regular rhythm and normal heart sounds. Tachycardia present. Exam reveals no gallop and no friction rub. No murmur heard. Pulmonary/Chest: Effort normal and breath sounds normal. No respiratory distress. He has no wheezes. Abdominal: Soft. Bowel sounds are normal. He exhibits no distension. There is tenderness in the epigastric area. There is no rebound and no guarding. Musculoskeletal: Normal range of motion. Neurological: He is alert and oriented to person, place, and time. Skin: Skin is warm. No rash noted. He is diaphoretic. Psychiatric: He has a normal mood and affect. His behavior is normal. Judgment and thought content normal.   Nursing note and vitals reviewed. Note written by Mara Villatoro, as dictated by Loli Lake MD 1:50 PM    MDM     This is a 55-year-old male with past medical history, review of systems, physical exam as above, presenting with complaints of approximately 5 days of nausea, vomiting, diarrhea, productive cough, noted to be febrile and tachycardic upon arrival.  Patient denies medications, denies medical problems, denies recent travel. He states that initial illness began as \"watery\" bowel movements. He denies abdominal pain, chest pain. Physical exam remarkable for ill-appearing elderly male, tachycardic, febrile, satting well on room air, normotensive. He has rhonchi and rails, without wheezing on auscultation, epigastric tenderness to palpation, with otherwise soft benign abdomen. Suspect acute viral illness, possibly primary diarrheal illness. Plan to obtain CMP, CBC, chest x-ray, UA, lactic acid, will provide antipyretics, IV fluids, reassess, and make a disposition.     Procedures    Hospitalist Perfect Serve for Admission  3:52 PM    ED Room Number: ER24/24  Patient Name and age:  Herman Larson III 62 y.o.  male  Working Diagnosis:   1. Hyponatremia    2. Febrile illness, acute    3.  Acute congestive heart failure, unspecified heart failure type ([facility]      Readmission: no  Isolation Requirements:  no  Recommended Level of Care:  step down  Code Status:  Full Code  Department:[facility] Adult ED - [phone]  Other:  Discussed with Dr. Rashid Ruvalcaba

## 2019-11-12 NOTE — PROGRESS NOTES
Admission Medication Reconciliation:    Information obtained from:  Patient   RxQuery data available¹:  NO    Comments/Recommendations: Updated PTA meds/reviewed patient's allergies. 1)  Patient has confirmed that he does not take any medications at home at this time. ¹RxQuery pharmacy benefit data reflects medications filled and processed through the patient's insurance, however   this data does NOT capture whether the medication was picked up or is currently being taken by the patient. Allergies:  Patient has no known allergies. Significant PMH/Disease States:   Past Medical History:   Diagnosis Date    Knee pain      Chief Complaint for this Admission:    Chief Complaint   Patient presents with    Diarrhea    Cough     Prior to Admission Medications:   None       Please contact the main inpatient pharmacy with any questions or concerns at [phone] and we will direct you to the clinical pharmacist covering this patient's care while in-house.    Jag Curtis, GABYD

## 2019-11-12 NOTE — PROGRESS NOTES
TRANSFER - IN REPORT:    Verbal report received from [facility], RN(name) on Shyam Corcoran III  being received from Trice castañeda RN(unit) for routine progression of care      Report consisted of patients Situation, Background, Assessment and   Recommendations(SBAR). Information from the following report(s) SBAR, Intake/Output, MAR, Recent Results and Cardiac Rhythm Sinus Tach was reviewed with the receiving nurse. Opportunity for questions and clarification was provided. Assessment completed upon patients arrival to unit and care assumed.

## 2019-11-13 ENCOUNTER — APPOINTMENT (OUTPATIENT)
Dept: GENERAL RADIOLOGY | Age: 59
DRG: 720 | End: 2019-11-13
Attending: NURSE PRACTITIONER
Payer: MEDICAID

## 2019-11-13 ENCOUNTER — APPOINTMENT (OUTPATIENT)
Dept: CT IMAGING | Age: 59
DRG: 720 | End: 2019-11-13
Attending: SURGERY
Payer: MEDICAID

## 2019-11-13 ENCOUNTER — APPOINTMENT (OUTPATIENT)
Dept: CT IMAGING | Age: 59
DRG: 720 | End: 2019-11-13
Attending: NURSE PRACTITIONER
Payer: MEDICAID

## 2019-11-13 ENCOUNTER — APPOINTMENT (OUTPATIENT)
Dept: GENERAL RADIOLOGY | Age: 59
DRG: 720 | End: 2019-11-13
Attending: INTERNAL MEDICINE
Payer: MEDICAID

## 2019-11-13 LAB
ANION GAP SERPL CALC-SCNC: 9 MMOL/L (ref 5–15)
ATRIAL RATE: 100 BPM
BUN SERPL-MCNC: 12 MG/DL (ref 6–20)
BUN/CREAT SERPL: 11 (ref 12–20)
CALCIUM SERPL-MCNC: 7.7 MG/DL (ref 8.5–10.1)
CALCULATED P AXIS, ECG09: 56 DEGREES
CALCULATED R AXIS, ECG10: 19 DEGREES
CALCULATED T AXIS, ECG11: 66 DEGREES
CHLORIDE SERPL-SCNC: 102 MMOL/L (ref 97–108)
CO2 SERPL-SCNC: 16 MMOL/L (ref 21–32)
CREAT SERPL-MCNC: 1.11 MG/DL (ref 0.7–1.3)
DIAGNOSIS, 93000: NORMAL
GLUCOSE SERPL-MCNC: 129 MG/DL (ref 65–100)
L PNEUMO1 AG UR QL IA: NEGATIVE
LACTATE SERPL-SCNC: 2.5 MMOL/L (ref 0.4–2)
P-R INTERVAL, ECG05: 170 MS
POTASSIUM SERPL-SCNC: 3.4 MMOL/L (ref 3.5–5.1)
PROCALCITONIN SERPL-MCNC: 47.7 NG/ML
Q-T INTERVAL, ECG07: 370 MS
QRS DURATION, ECG06: 102 MS
QTC CALCULATION (BEZET), ECG08: 477 MS
SODIUM SERPL-SCNC: 127 MMOL/L (ref 136–145)
SPECIMEN SOURCE: NORMAL
TROPONIN I SERPL-MCNC: 0.46 NG/ML
TROPONIN I SERPL-MCNC: 1.18 NG/ML
VENTRICULAR RATE, ECG03: 100 BPM

## 2019-11-13 PROCEDURE — 94664 DEMO&/EVAL PT USE INHALER: CPT

## 2019-11-13 PROCEDURE — 87075 CULTR BACTERIA EXCEPT BLOOD: CPT

## 2019-11-13 PROCEDURE — 77030020268 HC MISC GENERAL SUPPLY

## 2019-11-13 PROCEDURE — 65610000006 HC RM INTENSIVE CARE

## 2019-11-13 PROCEDURE — 74011636320 HC RX REV CODE- 636/320: Performed by: INTERNAL MEDICINE

## 2019-11-13 PROCEDURE — 74011250636 HC RX REV CODE- 250/636: Performed by: RADIOLOGY

## 2019-11-13 PROCEDURE — 90686 IIV4 VACC NO PRSV 0.5 ML IM: CPT | Performed by: FAMILY MEDICINE

## 2019-11-13 PROCEDURE — 83605 ASSAY OF LACTIC ACID: CPT

## 2019-11-13 PROCEDURE — 88333 PATH CONSLTJ SURG CYTO XM 1: CPT

## 2019-11-13 PROCEDURE — 77010033678 HC OXYGEN DAILY

## 2019-11-13 PROCEDURE — 93005 ELECTROCARDIOGRAM TRACING: CPT

## 2019-11-13 PROCEDURE — 80048 BASIC METABOLIC PNL TOTAL CA: CPT

## 2019-11-13 PROCEDURE — 36415 COLL VENOUS BLD VENIPUNCTURE: CPT

## 2019-11-13 PROCEDURE — 87076 CULTURE ANAEROBE IDENT EACH: CPT

## 2019-11-13 PROCEDURE — 84484 ASSAY OF TROPONIN QUANT: CPT

## 2019-11-13 PROCEDURE — 71045 X-RAY EXAM CHEST 1 VIEW: CPT

## 2019-11-13 PROCEDURE — 74011000258 HC RX REV CODE- 258: Performed by: SURGERY

## 2019-11-13 PROCEDURE — 77030003462 HC NDL BIOP BRST MDT -B

## 2019-11-13 PROCEDURE — 99152 MOD SED SAME PHYS/QHP 5/>YRS: CPT

## 2019-11-13 PROCEDURE — 90471 IMMUNIZATION ADMIN: CPT

## 2019-11-13 PROCEDURE — 77030014115

## 2019-11-13 PROCEDURE — 74011000258 HC RX REV CODE- 258: Performed by: INTERNAL MEDICINE

## 2019-11-13 PROCEDURE — 74011250636 HC RX REV CODE- 250/636: Performed by: SURGERY

## 2019-11-13 PROCEDURE — 77030003503 HC NDL BIOP TISS BD -B

## 2019-11-13 PROCEDURE — 74178 CT ABD&PLV WO CNTR FLWD CNTR: CPT

## 2019-11-13 PROCEDURE — 74011250636 HC RX REV CODE- 250/636: Performed by: FAMILY MEDICINE

## 2019-11-13 PROCEDURE — 74011000250 HC RX REV CODE- 250: Performed by: NURSE PRACTITIONER

## 2019-11-13 PROCEDURE — 94640 AIRWAY INHALATION TREATMENT: CPT

## 2019-11-13 PROCEDURE — 0FB23ZX EXCISION OF LEFT LOBE LIVER, PERCUTANEOUS APPROACH, DIAGNOSTIC: ICD-10-PCS | Performed by: RADIOLOGY

## 2019-11-13 PROCEDURE — 84145 PROCALCITONIN (PCT): CPT

## 2019-11-13 PROCEDURE — 74011250636 HC RX REV CODE- 250/636: Performed by: INTERNAL MEDICINE

## 2019-11-13 PROCEDURE — 87205 SMEAR GRAM STAIN: CPT

## 2019-11-13 PROCEDURE — 88307 TISSUE EXAM BY PATHOLOGIST: CPT

## 2019-11-13 PROCEDURE — 74011000250 HC RX REV CODE- 250: Performed by: INTERNAL MEDICINE

## 2019-11-13 PROCEDURE — 77030029684 HC NEB SM VOL KT MONA -A

## 2019-11-13 PROCEDURE — 0F923ZX DRAINAGE OF LEFT LOBE LIVER, PERCUTANEOUS APPROACH, DIAGNOSTIC: ICD-10-PCS | Performed by: RADIOLOGY

## 2019-11-13 RX ORDER — FENTANYL CITRATE 50 UG/ML
INJECTION, SOLUTION INTRAMUSCULAR; INTRAVENOUS
Status: DISCONTINUED
Start: 2019-11-13 | End: 2019-11-13

## 2019-11-13 RX ORDER — FUROSEMIDE 10 MG/ML
20 INJECTION INTRAMUSCULAR; INTRAVENOUS ONCE
Status: COMPLETED | OUTPATIENT
Start: 2019-11-13 | End: 2019-11-13

## 2019-11-13 RX ORDER — MORPHINE SULFATE 2 MG/ML
2 INJECTION, SOLUTION INTRAMUSCULAR; INTRAVENOUS
Status: DISCONTINUED | OUTPATIENT
Start: 2019-11-13 | End: 2019-11-15

## 2019-11-13 RX ORDER — SODIUM CHLORIDE 0.9 % (FLUSH) 0.9 %
10 SYRINGE (ML) INJECTION
Status: COMPLETED | OUTPATIENT
Start: 2019-11-13 | End: 2019-11-13

## 2019-11-13 RX ORDER — FENTANYL CITRATE 50 UG/ML
200 INJECTION, SOLUTION INTRAMUSCULAR; INTRAVENOUS
Status: DISCONTINUED | OUTPATIENT
Start: 2019-11-13 | End: 2019-11-13

## 2019-11-13 RX ORDER — IPRATROPIUM BROMIDE AND ALBUTEROL SULFATE 2.5; .5 MG/3ML; MG/3ML
3 SOLUTION RESPIRATORY (INHALATION)
Status: DISCONTINUED | OUTPATIENT
Start: 2019-11-13 | End: 2019-11-14

## 2019-11-13 RX ORDER — ONDANSETRON 2 MG/ML
4 INJECTION INTRAMUSCULAR; INTRAVENOUS
Status: DISCONTINUED | OUTPATIENT
Start: 2019-11-13 | End: 2019-11-23 | Stop reason: HOSPADM

## 2019-11-13 RX ORDER — SODIUM CHLORIDE 0.9 % (FLUSH) 0.9 %
10 SYRINGE (ML) INJECTION AS NEEDED
Status: DISCONTINUED | OUTPATIENT
Start: 2019-11-13 | End: 2019-11-13

## 2019-11-13 RX ORDER — SODIUM CHLORIDE 9 MG/ML
500 INJECTION, SOLUTION INTRAVENOUS CONTINUOUS
Status: DISCONTINUED | OUTPATIENT
Start: 2019-11-13 | End: 2019-11-13 | Stop reason: HOSPADM

## 2019-11-13 RX ORDER — ALBUTEROL SULFATE 0.83 MG/ML
SOLUTION RESPIRATORY (INHALATION)
Status: DISCONTINUED
Start: 2019-11-13 | End: 2019-11-13 | Stop reason: WASHOUT

## 2019-11-13 RX ORDER — MIDAZOLAM HYDROCHLORIDE 1 MG/ML
INJECTION, SOLUTION INTRAMUSCULAR; INTRAVENOUS
Status: DISCONTINUED
Start: 2019-11-13 | End: 2019-11-13

## 2019-11-13 RX ORDER — IPRATROPIUM BROMIDE AND ALBUTEROL SULFATE 2.5; .5 MG/3ML; MG/3ML
3 SOLUTION RESPIRATORY (INHALATION)
Status: COMPLETED | OUTPATIENT
Start: 2019-11-13 | End: 2019-11-13

## 2019-11-13 RX ORDER — IPRATROPIUM BROMIDE AND ALBUTEROL SULFATE 2.5; .5 MG/3ML; MG/3ML
SOLUTION RESPIRATORY (INHALATION)
Status: COMPLETED
Start: 2019-11-13 | End: 2019-11-14

## 2019-11-13 RX ORDER — METRONIDAZOLE 500 MG/100ML
500 INJECTION, SOLUTION INTRAVENOUS EVERY 8 HOURS
Status: DISCONTINUED | OUTPATIENT
Start: 2019-11-13 | End: 2019-11-13

## 2019-11-13 RX ORDER — MIDAZOLAM HYDROCHLORIDE 1 MG/ML
5 INJECTION, SOLUTION INTRAMUSCULAR; INTRAVENOUS
Status: DISCONTINUED | OUTPATIENT
Start: 2019-11-13 | End: 2019-11-13

## 2019-11-13 RX ORDER — HYDRALAZINE HYDROCHLORIDE 20 MG/ML
10 INJECTION INTRAMUSCULAR; INTRAVENOUS ONCE
Status: ACTIVE | OUTPATIENT
Start: 2019-11-13 | End: 2019-11-13

## 2019-11-13 RX ADMIN — SODIUM CHLORIDE 500 ML: 900 INJECTION, SOLUTION INTRAVENOUS at 15:46

## 2019-11-13 RX ADMIN — SODIUM CHLORIDE 1000 ML: 900 INJECTION, SOLUTION INTRAVENOUS at 11:09

## 2019-11-13 RX ADMIN — HEPARIN SODIUM 5000 UNITS: 5000 INJECTION INTRAVENOUS; SUBCUTANEOUS at 03:46

## 2019-11-13 RX ADMIN — SODIUM CHLORIDE 100 ML: 900 INJECTION, SOLUTION INTRAVENOUS at 10:09

## 2019-11-13 RX ADMIN — SODIUM CHLORIDE 1000 ML: 900 INJECTION, SOLUTION INTRAVENOUS at 11:57

## 2019-11-13 RX ADMIN — IPRATROPIUM BROMIDE AND ALBUTEROL SULFATE 3 ML: .5; 3 SOLUTION RESPIRATORY (INHALATION) at 17:58

## 2019-11-13 RX ADMIN — SODIUM CHLORIDE 1000 ML: 900 INJECTION, SOLUTION INTRAVENOUS at 09:45

## 2019-11-13 RX ADMIN — Medication 10 ML: at 10:09

## 2019-11-13 RX ADMIN — SODIUM CHLORIDE 500 ML: 900 INJECTION, SOLUTION INTRAVENOUS at 15:30

## 2019-11-13 RX ADMIN — MORPHINE SULFATE 2 MG: 2 INJECTION, SOLUTION INTRAMUSCULAR; INTRAVENOUS at 17:20

## 2019-11-13 RX ADMIN — IPRATROPIUM BROMIDE AND ALBUTEROL SULFATE 3 ML: .5; 3 SOLUTION RESPIRATORY (INHALATION) at 02:00

## 2019-11-13 RX ADMIN — FENTANYL CITRATE 50 MCG: 50 INJECTION, SOLUTION INTRAMUSCULAR; INTRAVENOUS at 15:46

## 2019-11-13 RX ADMIN — INFLUENZA VIRUS VACCINE 0.5 ML: 15; 15; 15; 15 SUSPENSION INTRAMUSCULAR at 08:12

## 2019-11-13 RX ADMIN — CEFTRIAXONE 1 G: 1 INJECTION, POWDER, FOR SOLUTION INTRAMUSCULAR; INTRAVENOUS at 08:27

## 2019-11-13 RX ADMIN — SODIUM CHLORIDE 75 ML/HR: 900 INJECTION, SOLUTION INTRAVENOUS at 02:07

## 2019-11-13 RX ADMIN — METRONIDAZOLE 500 MG: 500 INJECTION, SOLUTION INTRAVENOUS at 08:27

## 2019-11-13 RX ADMIN — FUROSEMIDE 20 MG: 10 INJECTION, SOLUTION INTRAMUSCULAR; INTRAVENOUS at 18:09

## 2019-11-13 RX ADMIN — PIPERACILLIN SODIUM AND TAZOBACTAM SODIUM 3.38 G: 3; .375 INJECTION, POWDER, LYOPHILIZED, FOR SOLUTION INTRAVENOUS at 17:00

## 2019-11-13 RX ADMIN — IOPAMIDOL 100 ML: 755 INJECTION, SOLUTION INTRAVENOUS at 10:09

## 2019-11-13 RX ADMIN — MIDAZOLAM 1 MG: 1 INJECTION INTRAMUSCULAR; INTRAVENOUS at 15:47

## 2019-11-13 RX ADMIN — IPRATROPIUM BROMIDE AND ALBUTEROL SULFATE 3 ML: .5; 3 SOLUTION RESPIRATORY (INHALATION) at 20:34

## 2019-11-13 NOTE — PROGRESS NOTES
Bedside and Verbal shift change report given to Reid Haywood RN (oncoming nurse) by Carmen Loyola RN (offgoing nurse). Report included the following information SBAR, Kardex, Intake/Output, MAR and Recent Results.

## 2019-11-13 NOTE — CONSULTS
Cardiology Consult Note      Patient Name: Mya Waldron III  : 1960 MRN: [id number]  Date: 2019  Time: 9:06 AM    Admit Diagnosis: Hyponatremia [E87.1]    Primary Cardiologist: none    Consulting Cardiologist: Joelle Jacobsen MD    Reason for Consult: Troponin elevation    Requesting MD: Melissa Mata MD    HPI:  Guilherme Pugh is a 62 y.o. male admitted on 2019  for Hyponatremia [E87.1]. has a past medical history of Knee pain. Presented with approximate 4 day h/o \"being sick as a dog\". N/V/D. Associated abdominal pain. He became concerned about his ongoing symptoms and presented. Noted to meet SIRS criterion, febrile, he was admitted. Usual protocol dictated checking troponin level which was mildly elevated to 0.46. He states he has no h/o CAD, MI or CHF. No family history as well. Denies HTN, HLD or DM. He does smoke on occasion. He says he is active and denies every having CP, SOB or edema of legs. Subjective:  He looks and feels terrible. Body aches, febrile and \"feels like he's been hit by a truck\". Assessment and Plan     1. Troponin elevation   - trivial at 0.46   - No associated symptoms   - EKG never ordered. Telemetry strip with sinus tach. EKG ordered. - Echo ordered   - suspect non specific in this setting of sepsis, diverticulitis, dehydration - N/V/D  2. Diverticulitis   - Microperf   - Gen Surg following  3. Tachycardia   - compensatory with number 2  4. RAFAEL   - related to sepsis and dehydration    Trivial troponin elevation - non specific in this setting of sepsis, diverticulitis, RAFAEL. EKG ordered, echo pending. No associated symptoms now or prior. No h/o cardiac issues with him or family. Will follow up echo and EKG results. Continue supportive care. Cardiology attending: seen and examined. Agree with assess and plan  Admitted with febrile illness.  Slight trop elevation. No cardiac symptoms and minimal cardiac risks. Chest clear, cv rrr no murmur, ext no edema. Suspect trop elevation from supply/demand. ekg and echo pending. If ok, needs no further cardiac testing. Patient Active Problem List   Diagnosis Code    Open dislocation of interphalangeal joint of fourth finger of left hand [id number]    Hyponatremia E87.1     No specialty comments available. Review of Systems:    [] Patient unable to provide secondary to condition    [x] All systems negative, except as checked below.   Constitutional:    []Weight Change  [x]Fever   []Chills   []Night Sweats  []Fatigue  [x]Malaise  []____  ENT/Mouth:     []Hearing Changes  []Ear Pain  []Nasal Congestion   []Sinus Pain  []Hoarseness   []Sore throat  []Rhinorrhea  []Swallowing Difficulty  []____  Eyes:    []Eye Pain  []Swelling  []Redness  []Foreign Body  []Discharge  []Vision Changes  []____  Cardiovascular:    []Chest Pain  []SOB  []PND  []JORDAN  []Orthopnea  []Claudication  []Edema   []Palpitations  []____  Respiratory:    []Cough  []Sputum  []Wheezing,  []SOB  []Hemoptysis  []____  Gastrointestinal:    [x]Nausea  [x]Vomiting  [x]Diarrhea  []Constipation  [x]Pain  []Heartburn  [x]Anorexia  []Dysphagia  []Hematochezia  []Melena,  []Jaundice  []____  Genitourinary:    []Dysuria  []Urinary Frequency  []Hematuria  []Urinary Incontinence  []Urgency  []Flank Pain  []Hesitancy  []____  Musculoskeletal:    []Arthralgias  [x]Myalgias  []Joint Swelling  []Joint Stiffness  []Back Pain  []Neck Pain  []____  Skin:    []Skin Lesions  []Pruritis  []Hair Changes  []Skin rashes  []____  Neuro:    []Weakness  []Numbness  []Paresthesias  []Loss of Consciousness  []Syncope   []Dizziness  []Headache  []Coordination Changes  []Recent Falls  []____  Psych:    []Anxiety/Depression  []Insomnia  []Memory Changes  []Violence/Abuse Hx.  []____  Heme/Lymph:    []Bruising  []Bleeding  []Lymphadenopathy  []____  Endocrine:    []Polyuria  []Polydipsia []Temperature Intolerance  []____         Previous treatment/evaluation includes   none  Cardiac risk factors:   smoking/ tobacco exposure, male gender. Past Medical History:   Diagnosis Date    Knee pain      Past Surgical History:   Procedure Laterality Date    HX GI      hernia repair    HX HEENT      tonsils removed    HX ORTHOPAEDIC      right knee surgery     Current Facility-Administered Medications   Medication Dose Route Frequency    hydrALAZINE (APRESOLINE) 20 mg/mL injection 10 mg  10 mg IntraVENous ONCE    cefTRIAXone (ROCEPHIN) 1 g in 0.9% sodium chloride (MBP/ADV) 50 mL  1 g IntraVENous Q24H    metroNIDAZOLE (FLAGYL) IVPB premix 500 mg  500 mg IntraVENous Q8H    ondansetron (ZOFRAN) injection 4 mg  4 mg IntraVENous Q8H PRN    morphine injection 2 mg  2 mg IntraVENous Q4H PRN    sodium chloride (NS) flush 5-40 mL  5-40 mL IntraVENous Q8H    sodium chloride (NS) flush 5-40 mL  5-40 mL IntraVENous PRN    0.9% sodium chloride infusion  100 mL/hr IntraVENous CONTINUOUS    heparin (porcine) injection 5,000 Units  5,000 Units SubCUTAneous Q8H       No Known Allergies   History reviewed. No pertinent family history.    Social History     Socioeconomic History    Marital status: SINGLE     Spouse name: Not on file    Number of children: Not on file    Years of education: Not on file    Highest education level: Not on file   Tobacco Use    Smoking status: Current Every Day Smoker     Packs/day: 0.50    Smokeless tobacco: Never Used   Substance and Sexual Activity    Alcohol use: Yes     Comment: occ    Drug use: No       Objective:    Physical Exam    Vitals:   Vitals:    11/13/19 0200 11/13/19 0226 11/13/19 0404 11/13/19 0825   BP:  148/82 129/60 125/73   Pulse:  (!) 129 (!) 125 (!) 117   Resp:  18 16 20   Temp:  98.3 °F (36.8 °C) 98.3 °F (36.8 °C) 98 °F (36.7 °C)   SpO2: 95% 96% 98% 95%   Weight:  232 lb 12.9 oz (105.6 kg)         General:    Alert, cooperative, no distress, appears stated age. Neck:   Supple, no carotid bruit and no JVD. Back:     Symmetric, normal curvature. Lungs:     Mild rhonchi to auscultation bilaterally. Heart[de-identified]    Regular rate and rhythm, S1, S2 normal, no murmur, click, rub or gallop. Abdomen:     Soft, non-tender. Bowel sounds normal.    Extremities:   Extremities normal, atraumatic, no cyanosis or edema. Vascular:   Pulses - 2+ radials   Skin:   Skin color normal. No rashes or lesions   Neurologic:   CN II-XII grossly intact. Telemetry: sinus tachycardia    ECG:   EKG Results     Procedure 720 Value Units Date/Time    EKG, 12 LEAD, INITIAL [[id number]]     Order Status:  Sent             Data Review:     Radiology:   XR Results (most recent):  Results from Hospital Encounter encounter on 11/12/19   XR CHEST PORT    Narrative PORTABLE CHEST RADIOGRAPH/S: 11/13/2019 4:53 AM    Clinical history: Wheezing and dyspnea  INDICATION:   wheeze, sob  COMPARISON: 11/12/2019    FINDINGS:  AP portable upright view of the chest demonstrates a stable  cardiopericardial  silhouette. The lungs are adequately expanded. Minimal interstitial edema. No  pleural effusion or pneumothorax. The osseous structures are unremarkable. Patient is on a cardiac monitor. Impression IMPRESSION:  Minimal interstitial edema. No significant change. .                  Recent Labs     11/13/19  0357 11/12/19 1858 11/12/19  1312   TROIQ 0.46* <0.05 <0.05     Recent Labs     11/13/19  0357 11/12/19  1312   * 127*   K 3.4* 3.4*    93*   CO2 16* 28   BUN 12 10   CREA 1.11 0.87   * 136*   CA 7.7* 8.0*     Recent Labs     11/12/19  1312   WBC 16.6*   HGB 14.6   HCT 43.0        Recent Labs     11/12/19  1858 11/12/19  1312   PTP 12.9*  --    INR 1.3*  --    SGOT  --  117*   AP  --  161*     No results for input(s): CHOL, LDLC in the last 72 hours.     No lab exists for component: TGL, HDLC,  HBA1C  Recent Labs     11/12/19  1312   TSH 1.16       Reinier GARCIA Eran Leo MD         [facility] [address] [place] [address] [address] [address]     [address] [facility]     [phone]    Dolores Jimenez MD

## 2019-11-13 NOTE — CONSULTS
[facility]  Inpatient Consultation        Admit Date: 11/12/2019  Reason for Consultation: diverticulitis w/ possible perf    HPI:  Alisa Lara is a 62 y.o. male whom we are asked to see in consultation by Dr. Caren Ventura for the above complaint. Pt presented to the ED last evening w/ c/o abd pain \"all over\" that started Thursday a/w diarrhea. It was very abrupt and he thought he had the flu - tested neg. .  The diarrhea stopped Saturday and has felt poorly since then. He hasn't had any vomiting. He hasn't eaten since that time and has consumed little in the way of liquids. He finally called EMS yesterday; +leukocytosis; Lactate 1.4 yesterday    CT scan - w/o contrast  1. Concentric bowel wall thickening of a 7 cm segment of sigmoid colon, where  there are extensive diverticula and mild pericolonic inflammation. This is  consistent with colitis, which may be due to diverticulitis. Notably, there is a  small amount of extraluminal gas suspected, immediately adjacent to the affected  segment of sigmoid colon. This may represent a localized perforation. 2. No evidence of abscess or bowel obstruction. 3. Diffuse fatty infiltration of the liver. Ultrasound   1. Enlarged, echogenic liver, compatible with diffuse fatty infiltration. 2. Complex mass in the left lobe of the liver measuring 5.6 cm.  3. No biliary ductal dilatation.         Patient Active Problem List    Diagnosis Date Noted    Hyponatremia 11/12/2019    Open dislocation of interphalangeal joint of fourth finger of left hand 11/14/2015     Past Medical History:   Diagnosis Date    Knee pain       Past Surgical History:   Procedure Laterality Date    HX GI      hernia repair    HX HEENT      tonsils removed    HX ORTHOPAEDIC      right knee surgery      Social History     Tobacco Use    Smoking status: Current Every Day Smoker     Packs/day: 0.50    Smokeless tobacco: Never Used   Substance Use Topics  Alcohol use: Yes     Comment: occ      History reviewed. No pertinent family history. Prior to Admission medications    Not on File     Current Facility-Administered Medications   Medication Dose Route Frequency    hydrALAZINE (APRESOLINE) 20 mg/mL injection 10 mg  10 mg IntraVENous ONCE    cefTRIAXone (ROCEPHIN) 1 g in 0.9% sodium chloride (MBP/ADV) 50 mL  1 g IntraVENous Q24H    metroNIDAZOLE (FLAGYL) IVPB premix 500 mg  500 mg IntraVENous Q8H    ondansetron (ZOFRAN) injection 4 mg  4 mg IntraVENous Q8H PRN    morphine injection 2 mg  2 mg IntraVENous Q4H PRN    sodium chloride 0.9 % bolus infusion 1,000 mL  1,000 mL IntraVENous ONCE    Followed by    sodium chloride 0.9 % bolus infusion 1,000 mL  1,000 mL IntraVENous ONCE    Followed by    sodium chloride 0.9 % bolus infusion 1,000 mL  1,000 mL IntraVENous ONCE    Followed by    sodium chloride 0.9 % bolus infusion 168 mL  168 mL IntraVENous ONCE    sodium chloride (NS) flush 5-40 mL  5-40 mL IntraVENous Q8H    sodium chloride (NS) flush 5-40 mL  5-40 mL IntraVENous PRN    0.9% sodium chloride infusion  150 mL/hr IntraVENous CONTINUOUS     No Known Allergies       Subjective:     Review of Systems:    A comprehensive review of systems was negative except for that written in the History of Present Illness. Objective:     Blood pressure 125/73, pulse (!) 117, temperature 98 °F (36.7 °C), resp. rate 20, weight 232 lb 12.9 oz (105.6 kg), SpO2 95 %.   Temp (24hrs), Av.9 °F (37.2 °C), Min:98 °F (36.7 °C), Max:101 °F (38.3 °C)      Recent Labs     19  1312   WBC 16.6*   HGB 14.6   HCT 43.0        Recent Labs     19  0357 19  1858 19  1312   *  --  127*   K 3.4*  --  3.4*     --  93*   CO2 16*  --  28   *  --  136*   BUN 12  --  10   CREA 1.11  --  0.87   CA 7.7*  --  8.0*   ALB  --   --  2.6*   TBILI  --   --  1.0   SGOT  --   --  117*   ALT  --   --  147*   INR  --  1.3*  --      Recent Labs 11/12/19  1312   LPSE 85         Intake/Output Summary (Last 24 hours) at 11/13/2019 0927  Last data filed at 11/13/2019 0820  Gross per 24 hour   Intake 367.5 ml   Output 1000 ml   Net -632.5 ml     Date 11/13/19 0700 - 11/14/19 0659   Shift 0437-0486 1338-2048 4895-4614 24 Hour Total   INTAKE   Shift Total(mL/kg)       OUTPUT   Urine(mL/kg/hr) 250   250   Shift Total(mL/kg) 250(2.4)   250(2.4)   Weight (kg) 105.6 105.6 105.6 105.6     _____________________  Physical Exam:     General:  Alert, cooperative, appears toxic   Eyes:   Sclera clear. Throat: Lips, mucosa, and tongue are dry   Neck: Supple, symmetrical, trachea midline. Lungs:   Clear to auscultation bilaterally. Heart:  tachycardic   Abdomen:   Appears distended but he says it's his nl abd; diminished BS, TTP LUQ   Extremities: Extremities normal, atraumatic, no cyanosis or edema. Skin: Skin color, texture, turgor normal. No rashes or lesions, some diaphoresis             Assessment:   Active Problems:    Hyponatremia (11/12/2019)            Plan:     IV hydration - boluses  CT w/ contrast today to look at liver abscess vs cyst and look for perforation  abx - flagyl and ceftriaxone  Npo  Pain mgmt  Dr Shelly Tee to see    Thank you for allowing us to participate in the care of this patient. Total time spent with patient: 25 minutes. Signed By: Levi Wilcox NP     November 13, 2019        Pt seen and examined. Pt with flu like symptoms and then developed diarrhea. He then developed abdominal pain in the upper abdomen. Ct showed micorperforation from  Diverticulitis. CT repeat with contrast shows liver abscess- Diverticulitis is unchanged. Gen- Alert in NAD  Lungs- CTA  H- Mildly tachy  Abd- Tender in the upper abdomen  Non tender in the  Lower abdomen. Wbc - 16  I suspect that he is septic from the liver abscess  Will order IR drainage.

## 2019-11-13 NOTE — PROGRESS NOTES
Hospitalist Progress Note  Ruben Shukla MD  Answering service: [phone] from in house phone        Date of Service:  2019  NAME:  Paula Tafoya III  :  1960  MRN:  [id number]  PCP: Davis Cid MD    Chief Complaint:   Chief Complaint   Patient presents with    Diarrhea    Cough         Admission Summary:     Patria Pascual is a 62 y.o. male who presented with diarrhea, abdominal pain    Interval history / Subjective:   Patient seen for Follow up of CC; sepsis  First encounter  Patient seen and examined by the bedside  Labs, images and notes reviewed  Patient is anxious, feels sick, diarrhea has improved, but he feels very tired and sick. He is full code and wants all the measures done to keep him alive. He is tachycardiac but maintaining BP. Patient denies significant abdominal pain. Denies nausea or vomiting. Discussed with nursing staff, no acute issues overnight, orders reviewed. Assessment & Plan:     - Sepsis, sec to severe colonic diverticulitis with microperforation  CT scan reviewed this am.  Blood cultures were ordered yesterday but obtained this am  Make pt NPO  Start sepsis protocol. sepsis re-assessment completed  Start 30ml/kr NSS bolus per protocol, then resume maintenance IVF. Nursing staff instructed to do that STAT. Check stat Lactate and procalcitonin  Started on IV ceftriaxone and Flagyl  Follow cx  Consulted Gen Surg  Transfer patient to IMCU  Repeat BMP and CBC in am  Abnormal liver cyst on USG: needs contrast CT abd/pelvis. DW gen Surg. Patient is agreeable as there is risk of NATTY given sepsis and mild creatinine elevation but there is a possibility there is a liver abscess.  Repeat lfts in am    - Hyponatremia: sec to sepsis and dehydration  Expected to improve with source control and IVF  Repeat BMP in am daily    - tachycardia, sec to above  IVF, sepsis management    - elevated troponin  Likely due to sepsis  Cards consulted, repeat trp I  No active CP, hx reported hx of CAD    - worsening creatinine  Due to sepsis  Cont IVF  Repeat labs in am    - hypokalemia  Replete, repeat labs in am      Code status: Full Code    DVT prophylaxis: SCDs    Care Plan discussed with: Patient/Family, Nurse and Consultant Gen Surg    Disposition: TBD    Hospital Problems  Never Reviewed          Codes Class Noted POA    Hyponatremia ICD-10-CM: E87.1  ICD-9-CM: 276.1  2019 Unknown                Review of Systems:   A comprehensive review of systems was negative. Physical Examination:     General appearance: mild distress, appears older than stated age, toxic, pale, morbidly obese  Head: Normocephalic, without obvious abnormality, atraumatic  Eyes: conjunctivae/corneas clear. PERRL. Lungs: clear to auscultation bilaterally, no rales, wheezing or rhonchi  Heart: sinus tachycardia, no murmurs, no gallops  Abdomen: distended, soft, mild tenderness LUQ. BS hyperactive, obese  Extremities: no edema  Skin: cold, clammy  Neurologic: Alert and oriented X 3, normal strength and tone. Vital Signs:    Last 24hrs VS reviewed since prior progress note. Most recent are:    Visit Vitals  /73 (BP 1 Location: Right arm, BP Patient Position: At rest)   Pulse (!) 117   Temp 98 °F (36.7 °C)   Resp 20   Wt 105.6 kg (232 lb 12.9 oz)   SpO2 95%   BMI 31.57 kg/m²         Intake/Output Summary (Last 24 hours) at 2019 0919  Last data filed at 2019 0820  Gross per 24 hour   Intake 367.5 ml   Output 1000 ml   Net -632.5 ml        Tmax:  Temp (24hrs), Av.9 °F (37.2 °C), Min:98 °F (36.7 °C), Max:101 °F (38.3 °C)      Data Review:   Data reviewed by myself:  Xr Chest Pa Lat    Result Date: 2019  INDICATION:  febrile illness Exam: Chest 2 views. Comparison: 3/22/2017. Findings: Cardiomediastinal silhouette is normal. Pulmonary vasculature is slightly prominent and increased since the prior study.  Mild interstitial prominence predominantly at the lung bases with minimal left basilar atelectasis. No pneumothorax or focal consolidation. . Chronic right-sided rib fractures. Impression: 1. Mild prominence of the central pulmonary vasculature with minimal interstitial prominence at the lung bases. Jenifer Manuel developing mild interstitial edema as opposed to atypical infection     Ct Chest Wo Cont    Result Date: 11/12/2019  EXAM:  CT thorax without contrast INDICATION:  Cough. COMPARISON: Chest radiographs 40/25/3506 TECHNIQUE: Helical CT of the chest is performed without intravenous contrast. Coronal and sagittal reformatted images are obtained. CT dose reduction was achieved through use of a standardized protocol tailored for this examination and automatic exposure control for dose modulation. Adaptive statistical iterative reconstruction (ASIR) was utilized. FINDINGS: The visualized thyroid gland is unremarkable. The aorta and main pulmonary artery are normal in caliber. There is coronary artery atherosclerosis. The heart size is normal.  There is no pericardial effusion. There are no enlarged axillary, mediastinal, or hilar lymph nodes. There are minimal subpleural reticular opacities in the lower lungs. There is minimal left basilar scarring or atelectasis. There is no suspicious lung nodule, mass, or consolidation. In the limited images of the upper abdomen, the partially imaged solid organs are unremarkable. Degenerative changes in the spine. There is no aggressive bony lesion. IMPRESSION: Minimal interstitial changes in the lower lungs with minimal left basilar scarring or atelectasis. No evidence for atypical infection or other acute abnormality. Ct Abd Pelv Wo Cont    Result Date: 11/12/2019  EXAM: CT ABD PELV WO CONT INDICATION: abd pain COMPARISON: CT chest 11/12/2019 CONTRAST:  None. TECHNIQUE: Thin axial images were obtained through the abdomen and pelvis. Coronal and sagittal reconstructions were generated.  Oral contrast was not administered. CT dose reduction was achieved through use of a standardized protocol tailored for this examination and automatic exposure control for dose modulation. The absence of intravenous contrast material reduces the sensitivity for evaluation of the solid parenchymal organs of the abdomen. FINDINGS: LUNG BASES: There is mild bibasilar atelectasis. INCIDENTALLY IMAGED HEART AND MEDIASTINUM: Unremarkable. LIVER: Diffuse fatty infiltration of the liver. GALLBLADDER: Unremarkable. SPLEEN: No mass. PANCREAS: No mass or ductal dilatation. ADRENALS: Unremarkable. KIDNEYS/URETERS: No mass, calculus, or hydronephrosis. STOMACH: Unremarkable. SMALL BOWEL: No bowel obstruction or perforation COLON: Extensive diverticulosis of the colon. There is bowel wall thickening of the sigmoid colon with extensive diverticula as well as pericolonic inflammation, but no focal fluid collections. There is also a small amount of gas adjacent to the affected loop of sigmoid colon, which may be extraluminal. There is no free intraperitoneal air. APPENDIX: Unremarkable. PERITONEUM: No ascites RETROPERITONEUM: No lymphadenopathy or aortic aneurysm. REPRODUCTIVE ORGANS: The prostate is noted. URINARY BLADDER: No mass or calculus. BONES: No destructive bone lesion. ADDITIONAL COMMENTS: There is degenerative disc disease which is most advanced at L4-L5. IMPRESSION: 1. Concentric bowel wall thickening of a 7 cm segment of sigmoid colon, where there are extensive diverticula and mild pericolonic inflammation. This is consistent with colitis, which may be due to diverticulitis. Notably, there is a small amount of extraluminal gas suspected, immediately adjacent to the affected segment of sigmoid colon. This may represent a localized perforation. 2. No evidence of abscess or bowel obstruction. 3. Diffuse fatty infiltration of the liver.     [facility]    Result Date: 11/12/2019  INDICATION:  Elevated LFTs COMPARISON:  CT scan earlier today FINDINGS: Limited right upper quadrant ultrasound was performed. The liver is diffusely increased in echogenicity and enlarged. Within the left lobe of the liver, there is an ill-defined, mixed echogenicity mass, measuring 5.6 x 5.5 x 3.6 cm. . The common bile duct is normal measuring 3 mm in diameter. The gallbladder is normal. The right kidney measures 11.8 cm in length. IMPRESSION: 1. Enlarged, echogenic liver, compatible with diffuse fatty infiltration. 2. Complex mass in the left lobe of the liver measuring 5.6 cm. 3. No biliary ductal dilatation. Further evaluation with liver mass protocol MRI or CT is recommended. Xr Chest Port    Result Date: 11/13/2019  PORTABLE CHEST RADIOGRAPH/S: 11/13/2019 4:53 AM Clinical history: Wheezing and dyspnea INDICATION:   wheeze, sob COMPARISON: 11/12/2019 FINDINGS: AP portable upright view of the chest demonstrates a stable  cardiopericardial silhouette. The lungs are adequately expanded. Minimal interstitial edema. No pleural effusion or pneumothorax. The osseous structures are unremarkable. Patient is on a cardiac monitor. IMPRESSION: Minimal interstitial edema. No significant change. .     No results found for: SDES  No results found for: CULT  All Micro Results     Procedure Component Value Units Date/Time    CULTURE, BLOOD, PAIRED [[id number]] Collected:  11/12/19 0803    Order Status:  Completed Specimen:  Blood Updated:  11/13/19 0909    C. DIFFICILE AG & TOXIN A/B [[id number]]     Order Status:  Sent Specimen:  Stool     LEGIONELLA PNEUMOPHILA AG, URINE [[id number]] Collected:  11/12/19 1540    Order Status:  Completed Specimen:  Urine Updated:  11/12/19 1552    URINE CULTURE HOLD SAMPLE [[id number]] Collected:  11/12/19 1540    Order Status:  Completed Specimen:  Urine from Serum Updated:  11/12/19 1551     Urine culture hold       URINE ON HOLD IN MICROBIOLOGY DEPT FOR 3 DAYS.  IF UNPRESERVED URINE IS SUBMITTED, IT CANNOT BE USED FOR ADDITIONAL TESTING AFTER 24 HRS, RECOLLECTION WILL BE REQUIRED. INFLUENZA A & B AG (RAPID TEST) [[id number]] Collected:  11/12/19 1312    Order Status:  Completed Specimen:  Nasopharyngeal from Nasal washing Updated:  11/12/19 1333     Influenza A Antigen NEGATIVE         Influenza B Antigen NEGATIVE              Labs: reviewed by myself. Recent Labs     11/12/19  1312   WBC 16.6*   HGB 14.6   HCT 43.0        Recent Labs     11/13/19  0357 11/12/19  1312   * 127*   K 3.4* 3.4*    93*   CO2 16* 28   BUN 12 10   CREA 1.11 0.87   * 136*   CA 7.7* 8.0*     Recent Labs     11/12/19  1312   SGOT 117*   *   *   TBILI 1.0   TP 6.7   ALB 2.6*   GLOB 4.1*   LPSE 85     Recent Labs     11/12/19 1858   INR 1.3*   PTP 12.9*      No results for input(s): FE, TIBC, PSAT, FERR in the last 72 hours. No results found for: FOL, RBCF   No results for input(s): PH, PCO2, PO2 in the last 72 hours.   Recent Labs     11/13/19  0357 11/12/19  1858 11/12/19 1312   TROIQ 0.46* <0.05 <0.05     No results found for: CHOL, CHOLX, CHLST, CHOLV, HDL, HDLP, LDL, LDLC, DLDLP, TGLX, TRIGL, TRIGP, CHHD, CHHDX  No results found for: GLUCPOC  Lab Results   Component Value Date/Time    Color DARK YELLOW 11/12/2019 03:40 PM    Appearance CLOUDY (A) 11/12/2019 03:40 PM    Specific gravity 1.023 11/12/2019 03:40 PM    pH (UA) 6.5 11/12/2019 03:40 PM    Protein 100 (A) 11/12/2019 03:40 PM    Glucose 100 (A) 11/12/2019 03:40 PM    Ketone TRACE (A) 11/12/2019 03:40 PM    Urobilinogen 4.0 (H) 11/12/2019 03:40 PM    Nitrites NEGATIVE  11/12/2019 03:40 PM    Leukocyte Esterase NEGATIVE  11/12/2019 03:40 PM    Epithelial cells FEW 11/12/2019 03:40 PM    Bacteria NEGATIVE  11/12/2019 03:40 PM    WBC 0-4 11/12/2019 03:40 PM    RBC 0-5 11/12/2019 03:40 PM         Medications Reviewed:     Current Facility-Administered Medications   Medication Dose Route Frequency    hydrALAZINE (APRESOLINE) 20 mg/mL injection 10 mg  10 mg IntraVENous ONCE    cefTRIAXone (ROCEPHIN) 1 g in 0.9% sodium chloride (MBP/ADV) 50 mL  1 g IntraVENous Q24H    metroNIDAZOLE (FLAGYL) IVPB premix 500 mg  500 mg IntraVENous Q8H    ondansetron (ZOFRAN) injection 4 mg  4 mg IntraVENous Q8H PRN    morphine injection 2 mg  2 mg IntraVENous Q4H PRN    sodium chloride 0.9 % bolus infusion 1,000 mL  1,000 mL IntraVENous ONCE    Followed by   Darragh.Ruse sodium chloride 0.9 % bolus infusion 1,000 mL  1,000 mL IntraVENous ONCE    Followed by   Darragh.Ruse sodium chloride 0.9 % bolus infusion 1,000 mL  1,000 mL IntraVENous ONCE    Followed by   Darragh.Ruse sodium chloride 0.9 % bolus infusion 168 mL  168 mL IntraVENous ONCE    sodium chloride (NS) flush 5-40 mL  5-40 mL IntraVENous Q8H    sodium chloride (NS) flush 5-40 mL  5-40 mL IntraVENous PRN    0.9% sodium chloride infusion  150 mL/hr IntraVENous CONTINUOUS     ______________________________________________________________________  EXPECTED LENGTH OF STAY: - - -  ACTUAL LENGTH OF STAY:          1                 Sophy Rodriguez MD     Patient's emergency contacts:  Extended Emergency Contact Information  Primary Emergency Contact: None, Given  Home Phone: [phone]  Relation: Other Non-relative

## 2019-11-13 NOTE — PROGRESS NOTES
Called by the IR angio staff to evaluate the patient. Patient had biopsy done of the liver mass, sent for culture demolition. Postop, patient became diaphoretic, tachypneic and tachycardic. Patient currently is improved after receiving IV morphine and his respiratory status is much better. Patient feels comfortable as well although he still is requiring a mental l oxygen and is tachypneic. A chest x-ray was done on my orders, which as per my interpretation, seems to have some pulmonary vascular congestion, so I will give him a small dose of Lasix to help diurese and put him on bronchodilators. Transfer to ICU. Patient is in agreement to ICU transfer. He understands that his condition is still critical and he may need aggressive treatment including endotracheal intubation and mechanical ventilation if he does not improve along with vasopressor support as apparently he is prone to volume overload more volume suspicion could cause pulmonary edema. Will get STAT blood gas as well.   Visit Vitals  /57 (BP 1 Location: Right arm, BP Patient Position: At rest)   Pulse (!) 132   Temp 97.9 °F (36.6 °C)   Resp (!) 42   Wt 105.6 kg (232 lb 12.9 oz)   SpO2 95%   BMI 31.57 kg/m²     On exam:   G: comfortable, tachypnea, AAOX3  Lungs: coarse crackles at the bases, occasional wheezing, tachypnea, mildly labored breathing  Abd: soft, biopsy site bandaged, NT, NG  Ext: no significant edema  CVS: sinus tachycardia

## 2019-11-13 NOTE — PROGRESS NOTES
Dr Edouard Schulz performed CT guided liver biopsy and abcess aspiration with moderate sedation. Cytology labs and cultures sent as ordered. Patient tolerated procedure well Post procedure vitals signs trended upward, pulse up to 140 and resps to low 40's. , with increased supplemental oxygen requirements and increased productive cough with wheezing and crackles. Dr Dougie Marsh was notified and came to see patient. CXR obtained, duoneb treatment given. Morphine 2 mg IV given with increase in comfort but resps remained same. Dr Dougie Marsh ordered transfer to ICU. TRANSFER - OUT REPORT:    Verbal report given to [address], RN(name) on [facility]  being transferred to ICU 3(unit) for urgent transfer       Report consisted of patients Situation, Background, Assessment and   Recommendations(SBAR). Information from the following report(s) SBAR, Procedure Summary, Intake/Output, MAR and Cardiac Rhythm Sinus tachycardia was reviewed with the receiving nurse. Lines:   Peripheral IV 11/12/19 Left Hand (Active)   Site Assessment Clean, dry, & intact 11/13/2019  8:20 AM   Phlebitis Assessment 0 11/13/2019  8:20 AM   Infiltration Assessment 0 11/13/2019  8:20 AM   Dressing Status Clean, dry, & intact 11/13/2019  8:20 AM   Dressing Type Transparent;Tape 11/13/2019  8:20 AM   Hub Color/Line Status Pink; Infusing 11/13/2019  8:20 AM   Action Taken Open ports on tubing capped 11/13/2019  8:20 AM   Alcohol Cap Used Yes 11/13/2019  8:20 AM        Opportunity for questions and clarification was provided.

## 2019-11-13 NOTE — H&P
[address]  HISTORY AND PHYSICAL    Name:  Varsha Rojo  MR#:  [id number]  :  1960  ACCOUNT #:  [de-identified]  ADMIT DATE:  2019    CHIEF COMPLAINT:  Abdominal pain. HISTORY OF PRESENT ILLNESS:  The patient is a 77-year-old gentleman with no significant past medical history, who presents to the hospital with the above-mentioned symptom. History was primarily obtained from the patient. The patient reports that about 4 days back, he became \"sick like a dog. \"  The patient reports he had nausea, vomiting, and diarrhea. Reports that he was not able to eat anything the next day. On , he had something to eat, but continued to have nausea and vomiting, so he stopped eating. His diarrhea stopped yesterday, because he had not eaten anything for the last 48 hours. Reports that he still feels abdominal pain. Reports he has some epigastric tenderness and reports that he has not been drinking that much. The patient reports that today he got concerned about his symptoms and decided to come to the hospital.  In the ER, the patient was found to have fever and met the SIRS criteria and was requested to be admitted under the hospitalist service. The patient denies any headaches, blurry vision, sore throat, trouble swallowing, trouble with speech, chest pain, shortness of breath, urinary symptoms, focal or generalized neurological weakness, falls, injuries, hematemesis, melena, hemoptysis, hematuria, or any other concerns or problems. PAST MEDICAL HISTORY:  See above. HOME MEDICATIONS:  None. SOCIAL HISTORY:  Current everyday smoker. No alcohol. No IV drug abuse. Lives at home. ALLERGIES:  NO KNOWN DRUG ALLERGIES. FAMILY HISTORY:  Family history was discussed, was found to be noncontributory, specifically no family history of inflammatory bowel disease.     PHYSICAL EXAMINATION:  VITAL SIGNS:  Temperature 101, pulse 103, respiratory rate 36, blood pressure 145/85, and pulse oximetry 97% on room air. GENERAL:  Alert x3, awake, sick-looking male, appears to be stated age. HEENT:  Pupils equal and reactive to light. Dry mucous membranes. Tympanic membranes clear. NECK:  Supple. CHEST:  Decreased basilar breath sounds. CORONARY:  S1 and S2 are heard. ABDOMEN:  Soft. Tender to palpation in the epigastric region. No rebound. No guarding. Bowel sounds were hypoactive. EXTREMITIES:  No clubbing, no cyanosis, no edema. NEURO/PSYCH:  Pleasant mood and affect. Cranial nerves II through XII grossly intact. No focal neurological deficits. SKIN:  Warm. LABORATORY DATA:  White count 16.6, hemoglobin 14.6, hematocrit 43, platelets 058. Urine shows no signs of infection. Sodium 127, potassium 3.4, chloride 93, anion gap of 6, bicarbonate 28, glucose 136, BUN 10, creatinine 0.87, calcium 8.0. Bilirubin total 1.0, , , alkaline phosphatase 161, lipase 85. Troponin less than 0.05 and BNP 1298. Stool is negative. Hepatitis panel is pending. CTA of the chest showed minimal interstitial changes in the lower lung with minimal left basilar scarring or atelectasis. No evidence of atypical infection or acute abnormality. X-ray of the chest shows mild prominence of central pulmonary vasculature and minimal interstitial prominence of the lung bases. Mild interstitial lung edema may be present. ASSESSMENT AND PLAN:  1. Abdominal pain and fever with meeting systemic inflammatory response syndrome criteria, likely secondary to viral gastroenteritis versus other etiology. The patient will be admitted on a telemetry bed. Start the patient on IV hydration and antipyretics. We will get a CT of the abdomen and pelvis to rule out any acute pathology, put the patient on clear-liquid diet, supportive care, and close monitoring. We will get blood cultures, we will hold antibiotics for now, and continue to closely monitor.   Further intervention per hospital course. Provide IV antiemetics and we will reassess as needed. 2.  Leukocytosis, likely secondary to abdominal pain and fever. We will trend and continue to closely monitor. Further intervention per hospital course. 3.  Hyponatremia, likely hypovolemic. We will provide normal saline, neurovascular checks, repeat labs in the morning, serum osmolality, and continue to closely monitor. Further intervention per hospital course. Reassess as needed. 4.  Hypokalemia. Replace potassium. 5.  Elevated liver function tests. Unclear etiology. Concern for gallbladder disease. We will check for acetaminophen level, right upper quadrant ultrasound, hepatitis panel, repeat labs in the morning. May consider getting a Gastroenterology consult and further intervention per hospital course. Reassess as needed and continue to closely monitor. 6.  Gastrointestinal and deep venous thrombosis prophylaxis. The patient will be on heparin.       Ashly Samuels MD      [id number]  D:  11/12/2019 17:25  T:  11/12/2019 20:09  JOB #:  [id number]

## 2019-11-13 NOTE — PROGRESS NOTES
Bedside and Verbal shift change report given to Reid Haywood RN (oncoming nurse) by Christie Cope RN (offgoing nurse). Report included the following information SBAR, Kardex, Intake/Output, MAR and Recent Results.

## 2019-11-13 NOTE — H&P
Interventional Radiology History and Physical (Inpatient)    Patient: Deana Fernando III 62 y.o. male     Referring Physician:  No ref. provider found    Chief Complaint: Diarrhea and Cough      History of Present Illness: conscious sedation (Versed and fentanyl) for liver aspiration/biopsy    History:  Past Medical History:   Diagnosis Date    Knee pain      History reviewed. No pertinent family history.   Social History     Socioeconomic History    Marital status: SINGLE     Spouse name: Not on file    Number of children: Not on file    Years of education: Not on file    Highest education level: Not on file   Occupational History    Not on file   Social Needs    Financial resource strain: Not on file    Food insecurity:     Worry: Not on file     Inability: Not on file    Transportation needs:     Medical: Not on file     Non-medical: Not on file   Tobacco Use    Smoking status: Current Every Day Smoker     Packs/day: 0.50    Smokeless tobacco: Never Used   Substance and Sexual Activity    Alcohol use: Yes     Comment: occ    Drug use: No    Sexual activity: Not on file   Lifestyle    Physical activity:     Days per week: Not on file     Minutes per session: Not on file    Stress: Not on file   Relationships    Social connections:     Talks on phone: Not on file     Gets together: Not on file     Attends Episcopal service: Not on file     Active member of club or organization: Not on file     Attends meetings of clubs or organizations: Not on file     Relationship status: Not on file    Intimate partner violence:     Fear of current or ex partner: Not on file     Emotionally abused: Not on file     Physically abused: Not on file     Forced sexual activity: Not on file   Other Topics Concern    Not on file   Social History Narrative    Not on file       Allergies: No Known Allergies    Current Medications:  Current Facility-Administered Medications   Medication Dose Route Frequency    ondansetron (ZOFRAN) injection 4 mg  4 mg IntraVENous Q8H PRN    morphine injection 2 mg  2 mg IntraVENous Q4H PRN    sodium chloride 0.9 % bolus infusion 168 mL  168 mL IntraVENous ONCE    piperacillin-tazobactam (ZOSYN) 3.375 g in 0.9% sodium chloride (MBP/ADV) 100 mL  3.375 g IntraVENous Q8H    sodium chloride (NS) flush 5-40 mL  5-40 mL IntraVENous Q8H    sodium chloride (NS) flush 5-40 mL  5-40 mL IntraVENous PRN    0.9% sodium chloride infusion  150 mL/hr IntraVENous CONTINUOUS        Physical Exam:  Blood pressure 96/63, pulse (!) 110, temperature 98 °F (36.7 °C), resp. rate 18, weight 105.6 kg (232 lb 12.9 oz), SpO2 96 %. GENERAL: alert, cooperative, no distress, appears stated age, LUNG: coarse in the bases, wet cough HEART: regular rate and rhythm    Findings/Diagnosis: conscious sedation (Versed and fentanyl) for liver aspiration/biopsy    Alerts:    Hospital Problems  Never Reviewed          Codes Class Noted POA    Hyponatremia ICD-10-CM: E87.1  ICD-9-CM: 276.1  11/12/2019 Unknown              Laboratory:      Recent Labs     11/13/19  0357 11/12/19  1858 11/12/19  1312   HGB  --   --  14.6   HCT  --   --  43.0   WBC  --   --  16.6*   PLT  --   --  161   INR  --  1.3*  --    BUN 12  --  10   CREA 1.11  --  0.87   K 3.4*  --  3.4*         Plan of Care/Planned Procedure:  Risks, benefits, and alternatives reviewed with patient and he agrees to proceed with the procedure. Full dictated report to follow.

## 2019-11-13 NOTE — PROGRESS NOTES
Spiritual Care Assessment/Progress Note  [address]      NAME: Deana Fernando III      MRN: [id number]  AGE: 62 y.o.  SEX: male  Congregation Affiliation: Latter-day   Language: English     11/13/2019     Total Time (in minutes): 5     Spiritual Assessment begun in [facility] RAD ANGIO IR through conversation with:         [x]Patient        [] Family    [] Friend(s)        Reason for Consult: [facility]     Spiritual beliefs: (Please include comment if needed)     [x] Identifies with a carin tradition: Latter-day        [x] Supported by a carin community:  [address] [place] [address]          [] Claims no spiritual orientation:           [] Seeking spiritual identity:                [] Adheres to an individual form of spirituality:           [] Not able to assess:                           Identified resources for coping:      [x] Prayer                               [x] Music                  [] Guided Imagery     [x] Family/friends                 [] Pet visits     [] Devotional reading                         [] Unknown     [] Other:                                             Interventions offered during this visit: (See comments for more details)    Patient Interventions: Affirmation of carin, Communion (Latter-day), Initial/Spiritual assessment, patient floor, Prayer (actual), Prayer (assurance of)           Plan of Care:     [x] Support spiritual and/or cultural needs    [] Support AMD and/or advance care planning process      [] Support grieving process   [] Coordinate Rites and/or Rituals    [] Coordination with community clergy   [] No spiritual needs identified at this time   [] Detailed Plan of Care below (See Comments)  [] Make referral to Music Therapy  [] Make referral to Pet Therapy     [] Make referral to Addiction services  [] Make referral to [facility]  [] Make referral to Spiritual Care Partner  [] No future visits requested        [] Follow up visits as needed     Comments: Mr. Ave Verma unable to receive communion due to NPO status for testing.   Prayer and spiritual communion offered prior to leaving for test.     Sr. JANET Douglas, RN, ACSW, LCSW   Page:  [phone]

## 2019-11-13 NOTE — PROGRESS NOTES
Bedside shift change report given to BRENNA Brewster (oncoming nurse) by Opal Diaz RN (offgoing nurse). Report included the following information SBAR, Intake/Output, MAR, Recent Results and Cardiac Rhythm SR/ST.

## 2019-11-14 ENCOUNTER — APPOINTMENT (OUTPATIENT)
Dept: GENERAL RADIOLOGY | Age: 59
DRG: 720 | End: 2019-11-14
Attending: NURSE PRACTITIONER
Payer: MEDICAID

## 2019-11-14 ENCOUNTER — APPOINTMENT (OUTPATIENT)
Dept: NON INVASIVE DIAGNOSTICS | Age: 59
DRG: 720 | End: 2019-11-14
Attending: PHYSICIAN ASSISTANT
Payer: MEDICAID

## 2019-11-14 LAB
ALBUMIN SERPL-MCNC: 2 G/DL (ref 3.5–5)
ALBUMIN/GLOB SERPL: 0.6 {RATIO} (ref 1.1–2.2)
ALP SERPL-CCNC: 125 U/L (ref 45–117)
ALT SERPL-CCNC: 105 U/L (ref 12–78)
ANION GAP SERPL CALC-SCNC: 7 MMOL/L (ref 5–15)
ARTERIAL PATENCY WRIST A: YES
AST SERPL-CCNC: 100 U/L (ref 15–37)
BASE DEFICIT BLD-SCNC: 1 MMOL/L
BASOPHILS # BLD: 0 K/UL (ref 0–0.1)
BASOPHILS NFR BLD: 0 % (ref 0–1)
BDY SITE: ABNORMAL
BILIRUB DIRECT SERPL-MCNC: 0.5 MG/DL (ref 0–0.2)
BILIRUB SERPL-MCNC: 0.9 MG/DL (ref 0.2–1)
BUN SERPL-MCNC: 14 MG/DL (ref 6–20)
BUN/CREAT SERPL: 18 (ref 12–20)
CALCIUM SERPL-MCNC: 7.6 MG/DL (ref 8.5–10.1)
CHLORIDE SERPL-SCNC: 100 MMOL/L (ref 97–108)
CO2 SERPL-SCNC: 26 MMOL/L (ref 21–32)
CREAT SERPL-MCNC: 0.79 MG/DL (ref 0.7–1.3)
DIFFERENTIAL METHOD BLD: ABNORMAL
EOSINOPHIL # BLD: 0 K/UL (ref 0–0.4)
EOSINOPHIL NFR BLD: 0 % (ref 0–7)
ERYTHROCYTE [DISTWIDTH] IN BLOOD BY AUTOMATED COUNT: 13 % (ref 11.5–14.5)
GAS FLOW.O2 O2 DELIVERY SYS: ABNORMAL L/MIN
GAS FLOW.O2 SETTING OXYMISER: 3 L/M
GLOBULIN SER CALC-MCNC: 3.1 G/DL (ref 2–4)
GLUCOSE SERPL-MCNC: 110 MG/DL (ref 65–100)
HCO3 BLD-SCNC: 22.8 MMOL/L (ref 22–26)
HCT VFR BLD AUTO: 36.4 % (ref 36.6–50.3)
HGB BLD-MCNC: 12.2 G/DL (ref 12.1–17)
IMM GRANULOCYTES # BLD AUTO: 0 K/UL
IMM GRANULOCYTES NFR BLD AUTO: 0 %
LYMPHOCYTES # BLD: 0.4 K/UL (ref 0.8–3.5)
LYMPHOCYTES NFR BLD: 3 % (ref 12–49)
MAGNESIUM SERPL-MCNC: 2.1 MG/DL (ref 1.6–2.4)
MCH RBC QN AUTO: 29.8 PG (ref 26–34)
MCHC RBC AUTO-ENTMCNC: 33.5 G/DL (ref 30–36.5)
MCV RBC AUTO: 88.8 FL (ref 80–99)
MONOCYTES # BLD: 0.5 K/UL (ref 0–1)
MONOCYTES NFR BLD: 4 % (ref 5–13)
NEUTS BAND NFR BLD MANUAL: 1 % (ref 0–6)
NEUTS SEG # BLD: 12.7 K/UL (ref 1.8–8)
NEUTS SEG NFR BLD: 92 % (ref 32–75)
NRBC # BLD: 0 K/UL (ref 0–0.01)
NRBC BLD-RTO: 0 PER 100 WBC
PCO2 BLD: 33 MMHG (ref 35–45)
PH BLD: 7.45 [PH] (ref 7.35–7.45)
PHOSPHATE SERPL-MCNC: 3.2 MG/DL (ref 2.6–4.7)
PLATELET # BLD AUTO: 113 K/UL (ref 150–400)
PMV BLD AUTO: 11 FL (ref 8.9–12.9)
PO2 BLD: 80 MMHG (ref 80–100)
POTASSIUM SERPL-SCNC: 2.9 MMOL/L (ref 3.5–5.1)
PROT SERPL-MCNC: 5.1 G/DL (ref 6.4–8.2)
RBC # BLD AUTO: 4.1 M/UL (ref 4.1–5.7)
RBC MORPH BLD: ABNORMAL
SAO2 % BLD: 96 % (ref 92–97)
SODIUM SERPL-SCNC: 133 MMOL/L (ref 136–145)
SPECIMEN TYPE: ABNORMAL
TOTAL RESP. RATE, ITRR: 21
WBC # BLD AUTO: 13.6 K/UL (ref 4.1–11.1)

## 2019-11-14 PROCEDURE — 74011000258 HC RX REV CODE- 258: Performed by: SURGERY

## 2019-11-14 PROCEDURE — 94640 AIRWAY INHALATION TREATMENT: CPT

## 2019-11-14 PROCEDURE — 65660000000 HC RM CCU STEPDOWN

## 2019-11-14 PROCEDURE — 74011250636 HC RX REV CODE- 250/636: Performed by: SURGERY

## 2019-11-14 PROCEDURE — 74011250636 HC RX REV CODE- 250/636: Performed by: INTERNAL MEDICINE

## 2019-11-14 PROCEDURE — 80048 BASIC METABOLIC PNL TOTAL CA: CPT

## 2019-11-14 PROCEDURE — 85025 COMPLETE CBC W/AUTO DIFF WBC: CPT

## 2019-11-14 PROCEDURE — 84100 ASSAY OF PHOSPHORUS: CPT

## 2019-11-14 PROCEDURE — 77010033678 HC OXYGEN DAILY

## 2019-11-14 PROCEDURE — 82803 BLOOD GASES ANY COMBINATION: CPT

## 2019-11-14 PROCEDURE — 36600 WITHDRAWAL OF ARTERIAL BLOOD: CPT

## 2019-11-14 PROCEDURE — C8929 TTE W OR WO FOL WCON,DOPPLER: HCPCS

## 2019-11-14 PROCEDURE — 74011000250 HC RX REV CODE- 250: Performed by: INTERNAL MEDICINE

## 2019-11-14 PROCEDURE — 74011250637 HC RX REV CODE- 250/637: Performed by: INTERNAL MEDICINE

## 2019-11-14 PROCEDURE — 80076 HEPATIC FUNCTION PANEL: CPT

## 2019-11-14 PROCEDURE — 83735 ASSAY OF MAGNESIUM: CPT

## 2019-11-14 PROCEDURE — 74011000250 HC RX REV CODE- 250

## 2019-11-14 PROCEDURE — 74011250636 HC RX REV CODE- 250/636: Performed by: NURSE PRACTITIONER

## 2019-11-14 PROCEDURE — 36415 COLL VENOUS BLD VENIPUNCTURE: CPT

## 2019-11-14 PROCEDURE — 71045 X-RAY EXAM CHEST 1 VIEW: CPT

## 2019-11-14 RX ORDER — ACETAMINOPHEN 325 MG/1
650 TABLET ORAL
Status: DISCONTINUED | OUTPATIENT
Start: 2019-11-14 | End: 2019-11-23 | Stop reason: HOSPADM

## 2019-11-14 RX ORDER — POTASSIUM CHLORIDE 7.45 MG/ML
10 INJECTION INTRAVENOUS
Status: DISPENSED | OUTPATIENT
Start: 2019-11-14 | End: 2019-11-14

## 2019-11-14 RX ORDER — IPRATROPIUM BROMIDE AND ALBUTEROL SULFATE 2.5; .5 MG/3ML; MG/3ML
3 SOLUTION RESPIRATORY (INHALATION)
Status: DISCONTINUED | OUTPATIENT
Start: 2019-11-14 | End: 2019-11-23 | Stop reason: HOSPADM

## 2019-11-14 RX ADMIN — IPRATROPIUM BROMIDE AND ALBUTEROL SULFATE 3 ML: .5; 3 SOLUTION RESPIRATORY (INHALATION) at 01:00

## 2019-11-14 RX ADMIN — POTASSIUM CHLORIDE 10 MEQ: 7.46 INJECTION, SOLUTION INTRAVENOUS at 09:39

## 2019-11-14 RX ADMIN — POTASSIUM CHLORIDE 10 MEQ: 7.46 INJECTION, SOLUTION INTRAVENOUS at 11:09

## 2019-11-14 RX ADMIN — MORPHINE SULFATE 2 MG: 2 INJECTION, SOLUTION INTRAMUSCULAR; INTRAVENOUS at 00:43

## 2019-11-14 RX ADMIN — IPRATROPIUM BROMIDE AND ALBUTEROL SULFATE 3 ML: .5; 3 SOLUTION RESPIRATORY (INHALATION) at 04:25

## 2019-11-14 RX ADMIN — IPRATROPIUM BROMIDE AND ALBUTEROL SULFATE 3 ML: .5; 3 SOLUTION RESPIRATORY (INHALATION) at 00:32

## 2019-11-14 RX ADMIN — SODIUM CHLORIDE 75 ML/HR: 900 INJECTION, SOLUTION INTRAVENOUS at 17:28

## 2019-11-14 RX ADMIN — SODIUM CHLORIDE 1.5 ML: 9 INJECTION INTRAMUSCULAR; INTRAVENOUS; SUBCUTANEOUS at 11:39

## 2019-11-14 RX ADMIN — ACETAMINOPHEN 650 MG: 325 TABLET, FILM COATED ORAL at 13:49

## 2019-11-14 RX ADMIN — MORPHINE SULFATE 2 MG: 2 INJECTION, SOLUTION INTRAMUSCULAR; INTRAVENOUS at 23:39

## 2019-11-14 RX ADMIN — Medication 10 ML: at 06:00

## 2019-11-14 RX ADMIN — IPRATROPIUM BROMIDE AND ALBUTEROL SULFATE 3 ML: .5; 3 SOLUTION RESPIRATORY (INHALATION) at 11:43

## 2019-11-14 RX ADMIN — PIPERACILLIN SODIUM AND TAZOBACTAM SODIUM 3.38 G: 3; .375 INJECTION, POWDER, LYOPHILIZED, FOR SOLUTION INTRAVENOUS at 00:44

## 2019-11-14 RX ADMIN — Medication 10 ML: at 23:39

## 2019-11-14 RX ADMIN — IPRATROPIUM BROMIDE AND ALBUTEROL SULFATE 3 ML: .5; 3 SOLUTION RESPIRATORY (INHALATION) at 07:17

## 2019-11-14 RX ADMIN — PIPERACILLIN SODIUM AND TAZOBACTAM SODIUM 3.38 G: 3; .375 INJECTION, POWDER, LYOPHILIZED, FOR SOLUTION INTRAVENOUS at 10:25

## 2019-11-14 RX ADMIN — Medication 10 ML: at 00:20

## 2019-11-14 RX ADMIN — PIPERACILLIN SODIUM AND TAZOBACTAM SODIUM 3.38 G: 3; .375 INJECTION, POWDER, LYOPHILIZED, FOR SOLUTION INTRAVENOUS at 17:37

## 2019-11-14 NOTE — PROGRESS NOTES
Progress Note    Patient: Edin Leggett III MRN: [id number]  SSN: [id number]    YOB: 1960  Age: 62 y.o. Sex: male      Admit Date: 2019    * No surgery found *    Procedure:      Subjective:     Patient got a little septic yesterday after the liver biopsy but is feeling better today. Objective:     Visit Vitals  /74   Pulse 100   Temp 98.6 °F (37 °C)   Resp 21   Wt 232 lb 12.9 oz (105.6 kg)   SpO2 94%   BMI 31.57 kg/m²       Temp (24hrs), Av.6 °F (37 °C), Min:97.9 °F (36.6 °C), Max:100.2 °F (37.9 °C)      Physical Exam:    Gen- alert in NAD  Lungs- CTA  H- RRR  Abd- S/minimal epigastric tenderness. Data Review: images and reports reviewed    Lab Review: All lab results for the last 24 hours reviewed.   Recent Results (from the past 24 hour(s))   TROPONIN I    Collection Time: 19 10:51 AM   Result Value Ref Range    Troponin-I, Qt. 1.18 (H) <0.05 ng/mL   LACTIC ACID    Collection Time: 19 10:51 AM   Result Value Ref Range    Lactic acid 2.5 (HH) 0.4 - 2.0 MMOL/L   PROCALCITONIN    Collection Time: 19 10:51 AM   Result Value Ref Range    Procalcitonin 47.7 ng/mL   EKG, 12 LEAD, INITIAL    Collection Time: 19 12:12 PM   Result Value Ref Range    Ventricular Rate 100 BPM    Atrial Rate 100 BPM    P-R Interval 170 ms    QRS Duration 102 ms    Q-T Interval 370 ms    QTC Calculation (Bezet) 477 ms    Calculated P Axis 56 degrees    Calculated R Axis 19 degrees    Calculated T Axis 66 degrees    Diagnosis       Normal sinus rhythm  Incomplete right bundle branch block  When compared with ECG of 09-MAR-2005 06:19,  QT has lengthened  Confirmed by Kimberly Santamaria M.D., Chante Pollack ([id number]) on 2019 10:18:57 PM     CULTURE, WOUND Randine Host STAIN    Collection Time: 19  3:30 PM   Result Value Ref Range    Special Requests: LIVER      GRAM STAIN 3+ WBCS SEEN      GRAM STAIN NO ORGANISMS SEEN      Culture result: PENDING    CULTURE, Ade 1978 STAIN    Collection Time: 11/13/19  4:15 PM   Result Value Ref Range    Special Requests: LIVER      GRAM STAIN 2+ WBCS SEEN      GRAM STAIN NO ORGANISMS SEEN      Culture result: PENDING    CBC WITH AUTOMATED DIFF    Collection Time: 11/14/19  4:43 AM   Result Value Ref Range    WBC 13.6 (H) 4.1 - 11.1 K/uL    RBC 4.10 4. 10 - 5.70 M/uL    HGB 12.2 12.1 - 17.0 g/dL    HCT 36.4 (L) 36.6 - 50.3 %    MCV 88.8 80.0 - 99.0 FL    MCH 29.8 26.0 - 34.0 PG    MCHC 33.5 30.0 - 36.5 g/dL    RDW 13.0 11.5 - 14.5 %    PLATELET 040 (L) 624 - 400 K/uL    MPV 11.0 8.9 - 12.9 FL    NRBC 0.0 0  WBC    ABSOLUTE NRBC 0.00 0.00 - 0.01 K/uL    NEUTROPHILS 92 (H) 32 - 75 %    BAND NEUTROPHILS 1 0 - 6 %    LYMPHOCYTES 3 (L) 12 - 49 %    MONOCYTES 4 (L) 5 - 13 %    EOSINOPHILS 0 0 - 7 %    BASOPHILS 0 0 - 1 %    IMMATURE GRANULOCYTES 0 %    ABS. NEUTROPHILS 12.7 (H) 1.8 - 8.0 K/UL    ABS. LYMPHOCYTES 0.4 (L) 0.8 - 3.5 K/UL    ABS. MONOCYTES 0.5 0.0 - 1.0 K/UL    ABS. EOSINOPHILS 0.0 0.0 - 0.4 K/UL    ABS. BASOPHILS 0.0 0.0 - 0.1 K/UL    ABS. IMM. GRANS. 0.0 K/UL    DF MANUAL      RBC COMMENTS FINA CELLS  PRESENT       METABOLIC PANEL, BASIC    Collection Time: 11/14/19  4:43 AM   Result Value Ref Range    Sodium 133 (L) 136 - 145 mmol/L    Potassium 2.9 (L) 3.5 - 5.1 mmol/L    Chloride 100 97 - 108 mmol/L    CO2 26 21 - 32 mmol/L    Anion gap 7 5 - 15 mmol/L    Glucose 110 (H) 65 - 100 mg/dL    BUN 14 6 - 20 MG/DL    Creatinine 0.79 0.70 - 1.30 MG/DL    BUN/Creatinine ratio 18 12 - 20      GFR est AA >60 >60 ml/min/1.73m2    GFR est non-AA >60 >60 ml/min/1.73m2    Calcium 7.6 (L) 8.5 - 10.1 MG/DL   HEPATIC FUNCTION PANEL    Collection Time: 11/14/19  4:43 AM   Result Value Ref Range    Protein, total 5.1 (L) 6.4 - 8.2 g/dL    Albumin 2.0 (L) 3.5 - 5.0 g/dL    Globulin 3.1 2.0 - 4.0 g/dL    A-G Ratio 0.6 (L) 1.1 - 2.2      Bilirubin, total 0.9 0.2 - 1.0 MG/DL    Bilirubin, direct 0.5 (H) 0.0 - 0.2 MG/DL    Alk.  phosphatase 125 (H) 45 - 117 U/L    AST (SGOT) 100 (H) 15 - 37 U/L    ALT (SGPT) 105 (H) 12 - 78 U/L   POC G3 - PUL    Collection Time: 11/14/19  7:24 AM   Result Value Ref Range    pH (POC) 7.448 7.35 - 7.45      pCO2 (POC) 33.0 (L) 35.0 - 45.0 MMHG    pO2 (POC) 80 80 - 100 MMHG    HCO3 (POC) 22.8 22 - 26 MMOL/L    sO2 (POC) 96 92 - 97 %    Base deficit (POC) 1 mmol/L    Site RIGHT BRACHIAL      Device: NASAL CANNULA      Flow rate (POC) 3 L/M    Allens test (POC) YES      Specimen type (POC) ARTERIAL      Total resp. rate 21       WBC on gram stain  Assessment:     Hospital Problems  Never Reviewed          Codes Class Noted POA    Hyponatremia ICD-10-CM: E87.1  ICD-9-CM: 276.1  11/12/2019 Unknown              Plan/Recommendations/Medical Decision Making:   S/p Aspiration of liver abscess  He had little bacteremia after biopsy but is now doing better. Should be able to start clear liquid diet. Continue abx   Await cultures.

## 2019-11-14 NOTE — PROGRESS NOTES
1935: Bedside shift change report given to Daksha Fernandes RN (oncoming nurse) by Kelvin Barrios RN (offgoing nurse). Report included the following information SBAR, Kardex, ED Summary, Intake/Output, MAR, Recent Results, Med Rec Status, Cardiac Rhythm ST and Alarm Parameters . 0710: Spoke with hospitalist, ABG ordered. Also received orders for potassium and a chest XR.     0730: Bedside shift change report given to [facility], [address] [place] [address] (oncoming nurse) by Daksha Fernandes RN (offgoing nurse). Report included the following information SBAR, Kardex, ED Summary, Intake/Output, MAR, Recent Results, Med Rec Status, Cardiac Rhythm SR and Alarm Parameters .

## 2019-11-14 NOTE — CDMP QUERY
RIFLE criteria Documentation of 'Acute Kidney Injury' is noted in the progress notes. Currently the patient does not meet RIFLE criteria (BSV approved) to support this diagnosis. If you are using another criteria to support this diagnosis, please document this in your progress note. Otherwise, please document in the progress notes the clinical indicators that support this diagnosis or state that the diagnosis has been ruled out. RIFLE  (BSV Approved) RISK:  Increased SCr x 1.5 or GFR decrease > 25% (within 7 days) INJURY:  Increased SCr x 2.0 or GFR decreased > 50% FAILURE:  Increased SCr x 3.0 or GFR decrease > 75% or SCr >4.0 mg/dL or acute increase >0.5 mg/dL LOSS:  Persistent acute renal failure = complete loss of kidney function > 4 weeks END STAGE:  End stage of kidney disease > 3 months AKIN 
 
STAGE  1:  Increase in SCr >/= 0.3 mg/dL or >/= 150% to 200% (1.5 to 2-fold) from baseline (within 48 hours) STAGE  2:  Increase in SCr to more than 200% to 300% (>2-3 fold) from baseline STAGE  3:  Increase in SCr to more than 300% (>3-fold) from baseline or SCr >/= 4.0 mg/dL with an acute increase of at least 0.5 mg/dL or initiation of renal replacement therapy KDIGO 
 
STAGE  1:  Increase in SCr by >/= 0.3 mg/dL within 48 hours or increase in SCr 1.5 to 1.9 times baseline which is known or presumed to have occurred within the prior 7 days STAGE  2:  Increase in SCr to 2.0 to 2.9 times baseline STAGE  3:  Increase in SCr to 3.0 times baseline or increase in SCr to >/= baseline or increase in SCr to >/= 4.0 mg/dL or initiation of renal replacement therapy Please clarify and document your clinical opinion in the progress notes and discharge summary including the definitive and/or presumptive diagnosis, (suspected or probable), related to the above clinical findings. Please include clinical findings supporting your diagnosis. Thank you, Edgar SHUKLA, RN 
CDI  
 [phone]

## 2019-11-14 NOTE — PROGRESS NOTES
TRANSFER - OUT REPORT:    Verbal report given to Clarice Lam (name) on Shyam Corcoran III  being transferred to ICU(unit) for routine progression of care       Report consisted of patients Situation, Background, Assessment and   Recommendations(SBAR). Information from the following report(s) SBAR, ED Summary, OR Summary, Procedure Summary, MAR and Cardiac Rhythm Normal Sinus was reviewed with the receiving nurse. Lines:   Peripheral IV 11/12/19 Left Hand (Active)   Site Assessment Clean, dry, & intact 11/14/2019  8:00 AM   Phlebitis Assessment 0 11/14/2019  8:00 AM   Infiltration Assessment 0 11/14/2019  8:00 AM   Dressing Status Clean, dry, & intact 11/14/2019  8:00 AM   Dressing Type Transparent;Tape 11/14/2019  8:00 AM   Hub Color/Line Status Pink; Infusing 11/14/2019  8:00 AM   Action Taken Open ports on tubing capped 11/14/2019  8:00 AM   Alcohol Cap Used Yes 11/14/2019  8:00 AM        Opportunity for questions and clarification was provided.       Patient transported with:   [employer]

## 2019-11-14 NOTE — PROGRESS NOTES
Problem: Risk for Spread of Infection  Goal: Prevent transmission of infectious organism to others  Description  Prevent the transmission of infectious organisms to other patients, staff members, and visitors. Outcome: Progressing Towards Goal     Problem: Patient Education:  Go to Education Activity  Goal: Patient/Family Education  Outcome: Progressing Towards Goal     Problem: General Medical Care Plan  Goal: *Vital signs within specified parameters  Outcome: Progressing Towards Goal  Goal: *Labs within defined limits  Outcome: Progressing Towards Goal  Goal: *Absence of infection signs and symptoms  Outcome: Progressing Towards Goal  Goal: *Optimal pain control at patient's stated goal  Outcome: Progressing Towards Goal  Goal: *Skin integrity maintained  Outcome: Progressing Towards Goal  Goal: *Fluid volume balance  Outcome: Progressing Towards Goal  Goal: *Optimize nutritional status  Outcome: Progressing Towards Goal  Goal: *Anxiety reduced or absent  Outcome: Progressing Towards Goal  Goal: *Progressive mobility and function (eg: ADL's)  Outcome: Progressing Towards Goal     Problem: Patient Education: Go to Patient Education Activity  Goal: Patient/Family Education  Outcome: Progressing Towards Goal     Problem: Falls - Risk of  Goal: *Absence of Falls  Description  Document Rebbecca Persons Fall Risk and appropriate interventions in the flowsheet. Outcome: Progressing Towards Goal  Note:   Fall Risk Interventions:            Medication Interventions: Evaluate medications/consider consulting pharmacy    Elimination Interventions: Patient to call for help with toileting needs              Problem: Patient Education: Go to Patient Education Activity  Goal: Patient/Family Education  Outcome: Progressing Towards Goal     Problem: Pressure Injury - Risk of  Goal: *Prevention of pressure injury  Description  Document Mj Scale and appropriate interventions in the flowsheet.   Outcome: Progressing Towards Goal  Note: Pressure Injury Interventions:             Activity Interventions: Pressure redistribution bed/mattress(bed type)    Mobility Interventions: Pressure redistribution bed/mattress (bed type)    Nutrition Interventions: Document food/fluid/supplement intake                     Problem: Patient Education: Go to Patient Education Activity  Goal: Patient/Family Education  Outcome: Progressing Towards Goal

## 2019-11-14 NOTE — PROGRESS NOTES
Hospitalist Progress Note  Kavya Jacobs MD  Answering service: [phone] from in house phone        Date of Service:  2019  NAME:  Neto Chavez III  :  1960  MRN:  [id number]  PCP: Ayan Kong MD    Chief Complaint:   Chief Complaint   Patient presents with    Diarrhea    Cough         Admission Summary:     Karena Gooden is a 62 y.o. male who presented with diarrhea, abdominal pain    Interval history / Subjective:   Patient seen for Follow up of CC; sepsis  Patient seen and examined by the bedside, labs, images and notes reviewed. Patient is much improved now, hemodynamically he is improving, respiratory status has improved significantly. He was clear to have clear liquid diet which he seems to be enjoying right now and is tolerating well. He was improving, T-max was 100 °F heart rate is improving as well. Discussed with cardiology and general surgery. Discussed with nursing staff, no acute events overnight, will be transferred out of ICU to telemetry bed. Orders reviewed. Assessment & Plan:     - Sepsis, sec to severe colonic diverticulitis with microperforation and liver abscess   status post liver biopsy 2019 by IR  Blood cultures x2no growth so far  Liver biopsy and culturesno growth so far  Significantly elevated procalcitonin level at 47, will repeat level in the morning  Diuretics have been changed to Zosyn, will continue, consult ID for further recommendations  General surgery consult appreciated, no acute surgical indication. Repeat CBC in the morning  Continue maintenance IV fluids    - Lactic acidosis sec to sepsis  improving    Acute respiratory insufficiency, volume overload   improving  Patient received a small dose of Lasix last night.     - Hyponatremia: sec to sepsis and dehydration, improving  Expected to improve with source control and IVF  Repeat BMP in am daily    -Hypokalemia, repleterepeat labs in the morning    - elevated troponin  Likely due to sepsis/tachycardia/demand supply mismatch  Cards consult noted, no ACS as per cardiology. Echocardiogram ordered, pending  No active CP, hx reported hx of CAD    - worsening creatinine  No RAFAEL noted, creatinine has not improved. Code status: Full Code    DVT prophylaxis: SCDs    Care Plan discussed with: Patient/Family, Nurse and Consultant Gen Surg    Disposition: TBD    Hospital Problems  Never Reviewed          Codes Class Noted POA    Hyponatremia ICD-10-CM: E87.1  ICD-9-CM: 276.1  2019 Unknown                Review of Systems:   A comprehensive review of systems was negative. Physical Examination:     General appearance: Alert, awake, appears older than his stated age, comfortable  Head: Normocephalic, without obvious abnormality, atraumatic  Eyes: conjunctivae/corneas clear. PERRL. Lungs: clear to auscultation bilaterally, no rales, wheezing or rhonchi  Heart: sinus tachycardia, no murmurs, no gallops  Abdomen: Obese, soft, mild tenderness in mid epigastrium at the site of liver biopsy bowel sounds positive  Extremities: no edema  Skin: Warm and dry  Neurologic: Alert and oriented X 3, normal strength and tone. Vital Signs:    Last 24hrs VS reviewed since prior progress note. Most recent are:    Visit Vitals  /71 (BP 1 Location: Right arm, BP Patient Position: At rest)   Pulse (!) 110   Temp 97.9 °F (36.6 °C)   Resp 18   Ht 5' 11\" (1.803 m)   Wt 105.2 kg (232 lb)   SpO2 100%   BMI 32.36 kg/m²         Intake/Output Summary (Last 24 hours) at 2019 1300  Last data filed at 2019 1243  Gross per 24 hour   Intake 3905 ml   Output 1820 ml   Net 2085 ml        Tmax:  Temp (24hrs), Av.5 °F (36.9 °C), Min:97.9 °F (36.6 °C), Max:100.2 °F (37.9 °C)      Data Review:   Data reviewed by myself:  Xr Chest Pa Lat    Result Date: 2019  INDICATION:  febrile illness Exam: Chest 2 views. Comparison: 3/22/2017.  Findings: Cardiomediastinal silhouette is normal. Pulmonary vasculature is slightly prominent and increased since the prior study. Mild interstitial prominence predominantly at the lung bases with minimal left basilar atelectasis. No pneumothorax or focal consolidation. . Chronic right-sided rib fractures. Impression: 1. Mild prominence of the central pulmonary vasculature with minimal interstitial prominence at the lung bases. Ollis Clines developing mild interstitial edema as opposed to atypical infection     Ct Chest Wo Cont    Result Date: 11/12/2019  EXAM:  CT thorax without contrast INDICATION:  Cough. COMPARISON: Chest radiographs 13/92/0779 TECHNIQUE: Helical CT of the chest is performed without intravenous contrast. Coronal and sagittal reformatted images are obtained. CT dose reduction was achieved through use of a standardized protocol tailored for this examination and automatic exposure control for dose modulation. Adaptive statistical iterative reconstruction (ASIR) was utilized. FINDINGS: The visualized thyroid gland is unremarkable. The aorta and main pulmonary artery are normal in caliber. There is coronary artery atherosclerosis. The heart size is normal.  There is no pericardial effusion. There are no enlarged axillary, mediastinal, or hilar lymph nodes. There are minimal subpleural reticular opacities in the lower lungs. There is minimal left basilar scarring or atelectasis. There is no suspicious lung nodule, mass, or consolidation. In the limited images of the upper abdomen, the partially imaged solid organs are unremarkable. Degenerative changes in the spine. There is no aggressive bony lesion. IMPRESSION: Minimal interstitial changes in the lower lungs with minimal left basilar scarring or atelectasis. No evidence for atypical infection or other acute abnormality.      Ct Abd Pelv Wo Cont    Result Date: 11/12/2019  EXAM: CT ABD PELV WO CONT INDICATION: abd pain COMPARISON: CT chest 11/12/2019 CONTRAST: None. TECHNIQUE: Thin axial images were obtained through the abdomen and pelvis. Coronal and sagittal reconstructions were generated. Oral contrast was not administered. CT dose reduction was achieved through use of a standardized protocol tailored for this examination and automatic exposure control for dose modulation. The absence of intravenous contrast material reduces the sensitivity for evaluation of the solid parenchymal organs of the abdomen. FINDINGS: LUNG BASES: There is mild bibasilar atelectasis. INCIDENTALLY IMAGED HEART AND MEDIASTINUM: Unremarkable. LIVER: Diffuse fatty infiltration of the liver. GALLBLADDER: Unremarkable. SPLEEN: No mass. PANCREAS: No mass or ductal dilatation. ADRENALS: Unremarkable. KIDNEYS/URETERS: No mass, calculus, or hydronephrosis. STOMACH: Unremarkable. SMALL BOWEL: No bowel obstruction or perforation COLON: Extensive diverticulosis of the colon. There is bowel wall thickening of the sigmoid colon with extensive diverticula as well as pericolonic inflammation, but no focal fluid collections. There is also a small amount of gas adjacent to the affected loop of sigmoid colon, which may be extraluminal. There is no free intraperitoneal air. APPENDIX: Unremarkable. PERITONEUM: No ascites RETROPERITONEUM: No lymphadenopathy or aortic aneurysm. REPRODUCTIVE ORGANS: The prostate is noted. URINARY BLADDER: No mass or calculus. BONES: No destructive bone lesion. ADDITIONAL COMMENTS: There is degenerative disc disease which is most advanced at L4-L5. IMPRESSION: 1. Concentric bowel wall thickening of a 7 cm segment of sigmoid colon, where there are extensive diverticula and mild pericolonic inflammation. This is consistent with colitis, which may be due to diverticulitis. Notably, there is a small amount of extraluminal gas suspected, immediately adjacent to the affected segment of sigmoid colon. This may represent a localized perforation.  2. No evidence of abscess or bowel obstruction. 3. Diffuse fatty infiltration of the liver. [id number]    Result Date: 11/12/2019  INDICATION:  Elevated LFTs COMPARISON:  CT scan earlier today FINDINGS: Limited right upper quadrant ultrasound was performed. The liver is diffusely increased in echogenicity and enlarged. Within the left lobe of the liver, there is an ill-defined, mixed echogenicity mass, measuring 5.6 x 5.5 x 3.6 cm. . The common bile duct is normal measuring 3 mm in diameter. The gallbladder is normal. The right kidney measures 11.8 cm in length. IMPRESSION: 1. Enlarged, echogenic liver, compatible with diffuse fatty infiltration. 2. Complex mass in the left lobe of the liver measuring 5.6 cm. 3. No biliary ductal dilatation. Further evaluation with liver mass protocol MRI or CT is recommended. Xr Chest Port    Result Date: 11/13/2019  PORTABLE CHEST RADIOGRAPH/S: 11/13/2019 4:53 AM Clinical history: Wheezing and dyspnea INDICATION:   wheeze, sob COMPARISON: 11/12/2019 FINDINGS: AP portable upright view of the chest demonstrates a stable  cardiopericardial silhouette. The lungs are adequately expanded. Minimal interstitial edema. No pleural effusion or pneumothorax. The osseous structures are unremarkable. Patient is on a cardiac monitor. IMPRESSION: Minimal interstitial edema. No significant change. .     No results found for: SDES  Lab Results   Component Value Date/Time    Culture result: NO GROWTH AFTER 15 HOURS 11/13/2019 04:15 PM    Culture result: PENDING 11/13/2019 04:00 PM    Culture result: NO GROWTH AFTER 15 HOURS 11/13/2019 03:30 PM     All Micro Results     Procedure Component Value Units Date/Time    CULTURE, Chris Deric STAIN [[id number]] Collected:  11/13/19 1530    Order Status:  Completed Specimen:  Abscess Updated:  11/14/19 1100     Special Requests: LIVER        GRAM STAIN 3+ WBCS SEEN         NO ORGANISMS SEEN        Culture result: NO GROWTH AFTER 15 HOURS       CULTURE, ANAEROBIC [[id number]] Collected:  11/13/19 1600    Order Status:  Completed Specimen:  Abscess Updated:  11/14/19 1057     Special Requests: LIVER     Culture result: PENDING    CULTURE, Sangeetha Ahuja STAIN [[id number]] Collected:  11/13/19 1615    Order Status:  Completed Specimen:  Abscess Updated:  11/14/19 1056     Special Requests: LIVER        GRAM STAIN 2+ WBCS SEEN         NO ORGANISMS SEEN        Culture result: NO GROWTH AFTER 15 HOURS       CULTURE, BLOOD, PAIRED [[id number]] Collected:  11/12/19 0803    Order Status:  Completed Specimen:  Blood Updated:  11/14/19 0952     Special Requests: NO SPECIAL REQUESTS        Culture result: NO GROWTH 1 DAY       CULTURE, TISSUE Thereasa Gravely STAIN [[id number]] Collected:  11/13/19 1600    Order Status:  Canceled Specimen:  Liver     CULTURE, BODY FLUID W Gordy Michelle [[id number]] Collected:  11/13/19 1600    Order Status:  Canceled Specimen:  Miscellaneous Fluid     LEGIONELLA PNEUMOPHILA AG, URINE [[id number]] Collected:  11/12/19 1540    Order Status:  Completed Specimen:  Urine Updated:  11/13/19 1536     Source URINE        L pneumophila S1 Ag, urine NEGATIVE         Comment: (NOTE)  Presumptive negative for L. pneumophila serogroup 1 antigen in urine,  suggesting no recent or current infection. Legionnaires' disease  cannot be ruled out since other serogroups and species may also  cause disease. Performed At: [address] [id number]  Wendy Bailey MD SB:[id number]         ENTERIC BACT. Alisha Byrne [[id number]] Collected:  11/12/19 1715    Order Status:  No result     C. DIFFICILE AG & TOXIN A/B [[id number]]     Order Status:  Sent Specimen:  Stool     URINE CULTURE HOLD SAMPLE [[id number]] Collected:  11/12/19 1540    Order Status:  Completed Specimen:  Urine from Serum Updated:  11/12/19 1551     Urine culture hold       URINE ON HOLD IN MICROBIOLOGY DEPT FOR 3 DAYS.  IF UNPRESERVED URINE IS SUBMITTED, IT CANNOT BE USED FOR ADDITIONAL TESTING AFTER 24 HRS, RECOLLECTION WILL BE REQUIRED. INFLUENZA A & B AG (RAPID TEST) [[id number]] Collected:  11/12/19 1312    Order Status:  Completed Specimen:  Nasopharyngeal from Nasal washing Updated:  11/12/19 1333     Influenza A Antigen NEGATIVE         Influenza B Antigen NEGATIVE              Labs: reviewed by myself. Recent Labs     11/14/19 0443 11/12/19 1312   WBC 13.6* 16.6*   HGB 12.2 14.6   HCT 36.4* 43.0   * 161     Recent Labs     11/14/19  0443 11/13/19  0357 11/12/19  1312   * 127* 127*   K 2.9* 3.4* 3.4*    102 93*   CO2 26 16* 28   BUN 14 12 10   CREA 0.79 1.11 0.87   * 129* 136*   CA 7.6* 7.7* 8.0*   MG 2.1  --   --    PHOS 3.2  --   --      Recent Labs     11/14/19 0443 11/12/19  1312   SGOT 100* 117*   * 147*   * 161*   TBILI 0.9 1.0   TP 5.1* 6.7   ALB 2.0* 2.6*   GLOB 3.1 4.1*   LPSE  --  85     Recent Labs     11/12/19 1858   INR 1.3*   PTP 12.9*      No results for input(s): FE, TIBC, PSAT, FERR in the last 72 hours. No results found for: FOL, RBCF   No results for input(s): PH, PCO2, PO2 in the last 72 hours.   Recent Labs     11/13/19  1051 11/13/19 0357 11/12/19 1858   TROIQ 1.18* 0.46* <0.05     No results found for: CHOL, CHOLX, CHLST, CHOLV, HDL, HDLP, LDL, LDLC, DLDLP, TGLX, TRIGL, TRIGP, CHHD, CHHDX  No results found for: GLUCPOC  Lab Results   Component Value Date/Time    Color DARK YELLOW 11/12/2019 03:40 PM    Appearance CLOUDY (A) 11/12/2019 03:40 PM    Specific gravity 1.023 11/12/2019 03:40 PM    pH (UA) 6.5 11/12/2019 03:40 PM    Protein 100 (A) 11/12/2019 03:40 PM    Glucose 100 (A) 11/12/2019 03:40 PM    Ketone TRACE (A) 11/12/2019 03:40 PM    Urobilinogen 4.0 (H) 11/12/2019 03:40 PM    Nitrites NEGATIVE  11/12/2019 03:40 PM    Leukocyte Esterase NEGATIVE  11/12/2019 03:40 PM    Epithelial cells FEW 11/12/2019 03:40 PM    Bacteria NEGATIVE  11/12/2019 03:40 PM    WBC 0-4 11/12/2019 03:40 PM    RBC 0-5 11/12/2019 03:40 PM Medications Reviewed:     Current Facility-Administered Medications   Medication Dose Route Frequency    piperacillin-tazobactam (ZOSYN) 3.375 g in 0.9% sodium chloride (MBP/ADV) 100 mL  3.375 g IntraVENous Q8H    ondansetron (ZOFRAN) injection 4 mg  4 mg IntraVENous Q8H PRN    morphine injection 2 mg  2 mg IntraVENous Q4H PRN    albuterol-ipratropium (DUO-NEB) 2.5 MG-0.5 MG/3 ML  3 mL Nebulization Q4H RT    sodium chloride (NS) flush 5-40 mL  5-40 mL IntraVENous Q8H    sodium chloride (NS) flush 5-40 mL  5-40 mL IntraVENous PRN    0.9% sodium chloride infusion  75 mL/hr IntraVENous CONTINUOUS     ______________________________________________________________________  EXPECTED LENGTH OF STAY: 4d 19h  ACTUAL LENGTH OF STAY:          2                 Noe Mulligan MD     Patient's emergency contacts:  Extended Emergency Contact Information  Primary Emergency Contact: None, Given  Home Phone: [phone]  Relation: Other Non-relative  Secondary Emergency Contact: Henry Campbell  Mobile Phone: [phone]  Relation: Brother

## 2019-11-14 NOTE — PROGRESS NOTES
0730 - Bedside shift change report given to MICHELET Moe [facility] (oncoming nurse) by Angelina Runner, RN (offgoing nurse). Report included the following information SBAR. Shift summary: 2/4 runs of potassium completed for potassium of 2.9    1130 - TRANSFER - OUT REPORT:    Verbal report given to Eri Cage RN(name) on [facility]  being transferred to (unit) for routine progression of care       Report consisted of patients Situation, Background, Assessment and   Recommendations(SBAR). Information from the following report(s) SBAR was reviewed with the receiving nurse. Lines:   Peripheral IV 11/12/19 Left Hand (Active)   Site Assessment Clean, dry, & intact 11/14/2019  8:00 AM   Phlebitis Assessment 0 11/14/2019  8:00 AM   Infiltration Assessment 0 11/14/2019  8:00 AM   Dressing Status Clean, dry, & intact 11/14/2019  8:00 AM   Dressing Type Transparent;Tape 11/14/2019  8:00 AM   Hub Color/Line Status Pink; Infusing 11/14/2019  8:00 AM   Action Taken Open ports on tubing capped 11/14/2019  8:00 AM   Alcohol Cap Used Yes 11/14/2019  8:00 AM        Opportunity for questions and clarification was provided.       Patient transported with:   Monitor  O2 @ 4 liters  Registered Nurse  [facility]

## 2019-11-14 NOTE — CONSULTS
ID consult received from office     Chart reviewed    S/P IR aspiration liver abscess/mass     Cultures pending for liver aspiration and blood ( so far day 1 blood negative)     On Zosyn IV for now     If bacteremia, check TTE    Patient to be seen and full consult to be done 11/15/19    Please contact me in the interim if any questions     Nely Alvarado, DO  11:29 AM

## 2019-11-14 NOTE — CARDIO/PULMONARY
Cardiac Rehab: Per EMR, patient is a current smoker. Smoking Cessation Program information placed on the AVS. Veronica Dickey RN

## 2019-11-14 NOTE — PROGRESS NOTES
Cardiology Progress Note            Admit Date: 11/12/2019  Admit Diagnosis: Hyponatremia [E87.1]  Date: 11/14/2019     Time: 8:40 AM    Subjective:  Feeling better following liver abscess drainage. No c/o CP or SOB. Hungry, wants to eat     Assessment and Plan     1. Troponin elevation              - 0.46 -->1.18              - No associated symptoms              - EKG NSR. No evidence for ACS              - Echo ordered - pending              - suspect non specific in this setting of sepsis, diverticulitis, dehydration, liver abscess  2. Diverticulitis              - Microperf              - Gen Surg following  3. Liver abscess   - s/p CT guided drainage   - Cx pending  4. RAFAEL              - related to sepsis and dehydration   - improved with IVF  5. Hypokalemia   - on replacement     Trop trend upwards. Still asymptomatic and no changes on EKG. Reviewed images CT of chest - coronary calcium seen at bifurcation of LAD/Lcx and mild about in proximal RCA. Feeling better following liver abscess drainage. Echo pending. Will follow up results.     Cardiology attending: seen and examined. Agree with assess and plan  Transiently unstable after liver drainage. Hypoxic and probably some fluid overload, better now. Chest crackles left base, cv rrr no murmur, ext no edema. Doubt any significant cardiac disease, await echo. Eventually risk stratify with stress testing, likely as outpt if remains stable from cardiac perspective.             Past Medical History:   Diagnosis Date    Knee pain       Social History     Tobacco Use    Smoking status: Current Every Day Smoker     Packs/day: 0.50    Smokeless tobacco: Never Used   Substance Use Topics    Alcohol use: Yes     Comment: occ    Drug use: No           Review of Systems:    [] Patient unable to provide secondary to condition    [] All systems negative, except as checked below.  Constitutional:    []Weight Change  []Fever   []Chills   []Night Sweats  []Fatigue  []Malaise  []____  ENT/Mouth:     []Hearing Changes  []Ear Pain  []Nasal Congestion   []Sinus Pain  []Hoarseness   []Sore throat  []Rhinorrhea  []Swallowing Difficulty  []____  Eyes:    []Eye Pain  []Swelling  []Redness  []Foreign Body  []Discharge  []Vision Changes  []____  Cardiovascular:    []Chest Pain  []SOB  []PND  []JORDAN  []Orthopnea  []Claudication  []Edema   []Palpitations  []____  Respiratory:    []Cough  []Sputum  []Wheezing,  []SOB  []Hemoptysis  []____  Gastrointestinal:    []Nausea  []Vomiting  []Diarrhea  []Constipation  [x]Pain  []Heartburn  []Anorexia  []Dysphagia  []Hematochezia  []Melena,  []Jaundice  []____  Genitourinary:    []Dysuria  []Urinary Frequency  []Hematuria  []Urinary Incontinence  []Urgency  []Flank Pain  []Hesitancy  []____  Musculoskeletal:    []Arthralgias  []Myalgias  []Joint Swelling  []Joint Stiffness  []Back Pain  []Neck Pain  []____  Skin:    []Skin Lesions  []Pruritis  []Hair Changes  []Skin rashes  []____  Neuro:    []Weakness  []Numbness  []Paresthesias  []Loss of Consciousness  []Syncope   []Dizziness  []Headache  []Coordination Changes  []Recent Falls  []____  Psych:    []Anxiety/Depression  []Insomnia  []Memory Changes  []Violence/Abuse Hx.  []____  Heme/Lymph:    []Bruising  []Bleeding  []Lymphadenopathy  []____  Endocrine:    []Polyuria  []Polydipsia  []Temperature Intolerance  []____         Objective:     Physical Exam:                Visit Vitals  /76   Pulse 92   Temp 98.6 °F (37 °C)   Resp 20   Wt 232 lb 12.9 oz (105.6 kg)   SpO2 95%   BMI 31.57 kg/m²        General Appearance:   Well developed, well nourished,alert and oriented x 3, and   individual in no acute distress. Ears/Nose/Mouth/Throat:    Hearing grossly normal.         Neck:  Supple. Chest:    Lungs crackles in bases L>R to auscultation bilaterally.    Cardiovascular:   Regular rate and rhythm, S1, S2 normal, no murmur. Abdomen:    Soft, mildly tender, bowel sounds are active. Extremities:  No edema bilaterally. Skin:  Warm and dry. Telemetry: normal sinus rhythm     Data Review:    Labs:    Recent Results (from the past 24 hour(s))   TROPONIN I    Collection Time: 11/13/19 10:51 AM   Result Value Ref Range    Troponin-I, Qt. 1.18 (H) <0.05 ng/mL   LACTIC ACID    Collection Time: 11/13/19 10:51 AM   Result Value Ref Range    Lactic acid 2.5 (HH) 0.4 - 2.0 MMOL/L   PROCALCITONIN    Collection Time: 11/13/19 10:51 AM   Result Value Ref Range    Procalcitonin 47.7 ng/mL   EKG, 12 LEAD, INITIAL    Collection Time: 11/13/19 12:12 PM   Result Value Ref Range    Ventricular Rate 100 BPM    Atrial Rate 100 BPM    P-R Interval 170 ms    QRS Duration 102 ms    Q-T Interval 370 ms    QTC Calculation (Bezet) 477 ms    Calculated P Axis 56 degrees    Calculated R Axis 19 degrees    Calculated T Axis 66 degrees    Diagnosis       Normal sinus rhythm  Incomplete right bundle branch block  When compared with ECG of 09-MAR-2005 06:19,  QT has lengthened  Confirmed by Lisa Morales M.D., Joaquín Espinoza ([id number]) on 11/13/2019 10:18:57 PM     CULTURE, WOUND Manter Drea STAIN    Collection Time: 11/13/19  3:30 PM   Result Value Ref Range    Special Requests: LIVER      GRAM STAIN 3+ WBCS SEEN      GRAM STAIN NO ORGANISMS SEEN      Culture result: PENDING    CULTURE, WOUND Brandin Drea STAIN    Collection Time: 11/13/19  4:15 PM   Result Value Ref Range    Special Requests: LIVER      GRAM STAIN 2+ WBCS SEEN      GRAM STAIN NO ORGANISMS SEEN      Culture result: PENDING    CBC WITH AUTOMATED DIFF    Collection Time: 11/14/19  4:43 AM   Result Value Ref Range    WBC 13.6 (H) 4.1 - 11.1 K/uL    RBC 4.10 4. 10 - 5.70 M/uL    HGB 12.2 12.1 - 17.0 g/dL    HCT 36.4 (L) 36.6 - 50.3 %    MCV 88.8 80.0 - 99.0 FL    MCH 29.8 26.0 - 34.0 PG    MCHC 33.5 30.0 - 36.5 g/dL    RDW 13.0 11.5 - 14.5 %    PLATELET 167 (L) 334 - 400 K/uL    MPV 11.0 8.9 - 12.9 FL NRBC 0.0 0  WBC    ABSOLUTE NRBC 0.00 0.00 - 0.01 K/uL    NEUTROPHILS 92 (H) 32 - 75 %    BAND NEUTROPHILS 1 0 - 6 %    LYMPHOCYTES 3 (L) 12 - 49 %    MONOCYTES 4 (L) 5 - 13 %    EOSINOPHILS 0 0 - 7 %    BASOPHILS 0 0 - 1 %    IMMATURE GRANULOCYTES 0 %    ABS. NEUTROPHILS 12.7 (H) 1.8 - 8.0 K/UL    ABS. LYMPHOCYTES 0.4 (L) 0.8 - 3.5 K/UL    ABS. MONOCYTES 0.5 0.0 - 1.0 K/UL    ABS. EOSINOPHILS 0.0 0.0 - 0.4 K/UL    ABS. BASOPHILS 0.0 0.0 - 0.1 K/UL    ABS. IMM. GRANS. 0.0 K/UL    DF MANUAL      RBC COMMENTS FINA CELLS  PRESENT       METABOLIC PANEL, BASIC    Collection Time: 11/14/19  4:43 AM   Result Value Ref Range    Sodium 133 (L) 136 - 145 mmol/L    Potassium 2.9 (L) 3.5 - 5.1 mmol/L    Chloride 100 97 - 108 mmol/L    CO2 26 21 - 32 mmol/L    Anion gap 7 5 - 15 mmol/L    Glucose 110 (H) 65 - 100 mg/dL    BUN 14 6 - 20 MG/DL    Creatinine 0.79 0.70 - 1.30 MG/DL    BUN/Creatinine ratio 18 12 - 20      GFR est AA >60 >60 ml/min/1.73m2    GFR est non-AA >60 >60 ml/min/1.73m2    Calcium 7.6 (L) 8.5 - 10.1 MG/DL   HEPATIC FUNCTION PANEL    Collection Time: 11/14/19  4:43 AM   Result Value Ref Range    Protein, total 5.1 (L) 6.4 - 8.2 g/dL    Albumin 2.0 (L) 3.5 - 5.0 g/dL    Globulin 3.1 2.0 - 4.0 g/dL    A-G Ratio 0.6 (L) 1.1 - 2.2      Bilirubin, total 0.9 0.2 - 1.0 MG/DL    Bilirubin, direct 0.5 (H) 0.0 - 0.2 MG/DL    Alk. phosphatase 125 (H) 45 - 117 U/L    AST (SGOT) 100 (H) 15 - 37 U/L    ALT (SGPT) 105 (H) 12 - 78 U/L   POC G3 - PUL    Collection Time: 11/14/19  7:24 AM   Result Value Ref Range    pH (POC) 7.448 7.35 - 7.45      pCO2 (POC) 33.0 (L) 35.0 - 45.0 MMHG    pO2 (POC) 80 80 - 100 MMHG    HCO3 (POC) 22.8 22 - 26 MMOL/L    sO2 (POC) 96 92 - 97 %    Base deficit (POC) 1 mmol/L    Site RIGHT BRACHIAL      Device: NASAL CANNULA      Flow rate (POC) 3 L/M    Allens test (POC) YES      Specimen type (POC) ARTERIAL      Total resp.  rate 21            Radiology:        Current Facility-Administered Medications   Medication Dose Route Frequency    potassium chloride 10 mEq in 100 ml IVPB  10 mEq IntraVENous Q1H    ondansetron (ZOFRAN) injection 4 mg  4 mg IntraVENous Q8H PRN    morphine injection 2 mg  2 mg IntraVENous Q4H PRN    piperacillin-tazobactam (ZOSYN) 3.375 g in 0.9% sodium chloride (MBP/ADV) 100 mL  3.375 g IntraVENous Q8H    albuterol-ipratropium (DUO-NEB) 2.5 MG-0.5 MG/3 ML  3 mL Nebulization Q4H RT    sodium chloride (NS) flush 5-40 mL  5-40 mL IntraVENous Q8H    sodium chloride (NS) flush 5-40 mL  5-40 mL IntraVENous PRN    0.9% sodium chloride infusion  75 mL/hr IntraVENous CONTINUOUS       Reinier Whitney MD     [facility] [address] [place] [address],[address]   [place]   [phone]

## 2019-11-14 NOTE — PROGRESS NOTES
Transitions of Care    Home with family/ friend support    Reason for Admission:  SIRS                    RRAT Score: 5 / low                    Plan for utilizing home health:   TBD                       Current Advanced Directive/Advance Care Plan: Not on file, offered patient and oppportunity to complete an AMD and he declined. Patient's brother Joanie Rendon [phone] is NOK. Code status: Full                         Care Manger met with patient to introduce self and explain role. Patient lives independently with room mates. He confirmed his PCP to be Dr Lynda Shields and sees him yearly,he uses the pharmacy at [facility]. Per patient he has no previous home health or equipment needs. Confirmed insurance to be Medicare A,B. Per patient one of his room mates are at home and can assist him if needed. Care management will follow for transitions of care needs.  Channing Albarran RN,CRM    Care Management Interventions  PCP Verified by CM: Yes(Dr Lynda Shields)  Palliative Care Criteria Met (RRAT>21 & CHF Dx)?: No  MyChart Signup: No  Discharge Durable Medical Equipment: No  Physical Therapy Consult: No  Occupational Therapy Consult: No  Speech Therapy Consult: No  Current Support Network: (Brother Joanie Rendon [phone])  Confirm Follow Up Transport: Friends

## 2019-11-14 NOTE — PROGRESS NOTES
PCCM    61 yo transferred to ICU for sob and tachycardia after liver biopsy  CXR with pulm edema  Given diuretics  CXR this am with decreased edema    On NC 4 L  No drips    Does not appear to require an ICU level of support at this time    Transfer to telemetry    Sophy Chavez MD

## 2019-11-15 LAB
ANION GAP SERPL CALC-SCNC: 10 MMOL/L (ref 5–15)
BACTERIA SPEC CULT: ABNORMAL
BASOPHILS # BLD: 0 K/UL (ref 0–0.1)
BASOPHILS NFR BLD: 0 % (ref 0–1)
BUN SERPL-MCNC: 11 MG/DL (ref 6–20)
BUN/CREAT SERPL: 16 (ref 12–20)
CALCIUM SERPL-MCNC: 7.4 MG/DL (ref 8.5–10.1)
CHLORIDE SERPL-SCNC: 97 MMOL/L (ref 97–108)
CO2 SERPL-SCNC: 24 MMOL/L (ref 21–32)
CREAT SERPL-MCNC: 0.68 MG/DL (ref 0.7–1.3)
DIFFERENTIAL METHOD BLD: ABNORMAL
ECHO AO ROOT DIAM: 4.01 CM
ECHO AV PEAK GRADIENT: 6.1 MMHG
ECHO AV PEAK VELOCITY: 123.02 CM/S
ECHO LA MAJOR AXIS: 2.98 CM
ECHO LA TO AORTIC ROOT RATIO: 0.74
ECHO LV INTERNAL DIMENSION DIASTOLIC: 5.36 CM (ref 4.2–5.9)
ECHO LV INTERNAL DIMENSION SYSTOLIC: 3.95 CM
ECHO LV IVSD: 0.96 CM (ref 0.6–1)
ECHO LV MASS 2D: 227 G (ref 88–224)
ECHO LV MASS INDEX 2D: 101.1 G/M2 (ref 49–115)
ECHO LV POSTERIOR WALL DIASTOLIC: 0.96 CM (ref 0.6–1)
ECHO MV A VELOCITY: 77.09 CM/S
ECHO MV AREA PHT: 3.7 CM2
ECHO MV E DECELERATION TIME (DT): 205.1 MS
ECHO MV E VELOCITY: 81.99 CM/S
ECHO MV E/A RATIO: 1.06
ECHO MV PRESSURE HALF TIME (PHT): 59.5 MS
ECHO PV MAX VELOCITY: 55.78 CM/S
ECHO PV PEAK GRADIENT: 1.2 MMHG
ECHO RV INTERNAL DIMENSION: 3.84 CM
EOSINOPHIL # BLD: 0 K/UL (ref 0–0.4)
EOSINOPHIL NFR BLD: 0 % (ref 0–7)
ERYTHROCYTE [DISTWIDTH] IN BLOOD BY AUTOMATED COUNT: 13.2 % (ref 11.5–14.5)
GLUCOSE SERPL-MCNC: 110 MG/DL (ref 65–100)
GRAM STN SPEC: ABNORMAL
HCT VFR BLD AUTO: 37.7 % (ref 36.6–50.3)
HGB BLD-MCNC: 12.7 G/DL (ref 12.1–17)
IMM GRANULOCYTES # BLD AUTO: 0.4 K/UL (ref 0–0.04)
IMM GRANULOCYTES NFR BLD AUTO: 2 % (ref 0–0.5)
LVFS 2D: 26.25 %
LYMPHOCYTES # BLD: 0.5 K/UL (ref 0.8–3.5)
LYMPHOCYTES NFR BLD: 3 % (ref 12–49)
MCH RBC QN AUTO: 30.2 PG (ref 26–34)
MCHC RBC AUTO-ENTMCNC: 33.7 G/DL (ref 30–36.5)
MCV RBC AUTO: 89.5 FL (ref 80–99)
MONOCYTES # BLD: 0.7 K/UL (ref 0–1)
MONOCYTES NFR BLD: 4 % (ref 5–13)
MV DEC SLOPE: 4
NEUTS SEG # BLD: 16.6 K/UL (ref 1.8–8)
NEUTS SEG NFR BLD: 91 % (ref 32–75)
NRBC # BLD: 0 K/UL (ref 0–0.01)
NRBC BLD-RTO: 0 PER 100 WBC
PLATELET # BLD AUTO: 155 K/UL (ref 150–400)
PMV BLD AUTO: 11 FL (ref 8.9–12.9)
POTASSIUM SERPL-SCNC: 3 MMOL/L (ref 3.5–5.1)
PROCALCITONIN SERPL-MCNC: 16.4 NG/ML
RBC # BLD AUTO: 4.21 M/UL (ref 4.1–5.7)
RBC MORPH BLD: ABNORMAL
RBC MORPH BLD: ABNORMAL
SERVICE CMNT-IMP: ABNORMAL
SERVICE CMNT-IMP: ABNORMAL
SODIUM SERPL-SCNC: 131 MMOL/L (ref 136–145)
WBC # BLD AUTO: 18.2 K/UL (ref 4.1–11.1)

## 2019-11-15 PROCEDURE — 36415 COLL VENOUS BLD VENIPUNCTURE: CPT

## 2019-11-15 PROCEDURE — 74011000258 HC RX REV CODE- 258: Performed by: SURGERY

## 2019-11-15 PROCEDURE — 84145 PROCALCITONIN (PCT): CPT

## 2019-11-15 PROCEDURE — 85025 COMPLETE CBC W/AUTO DIFF WBC: CPT

## 2019-11-15 PROCEDURE — 74011250636 HC RX REV CODE- 250/636: Performed by: INTERNAL MEDICINE

## 2019-11-15 PROCEDURE — 65660000000 HC RM CCU STEPDOWN

## 2019-11-15 PROCEDURE — 80048 BASIC METABOLIC PNL TOTAL CA: CPT

## 2019-11-15 PROCEDURE — 74011250636 HC RX REV CODE- 250/636: Performed by: SURGERY

## 2019-11-15 PROCEDURE — 74011250637 HC RX REV CODE- 250/637: Performed by: INTERNAL MEDICINE

## 2019-11-15 RX ORDER — POTASSIUM CHLORIDE 750 MG/1
40 TABLET, FILM COATED, EXTENDED RELEASE ORAL EVERY 4 HOURS
Status: COMPLETED | OUTPATIENT
Start: 2019-11-15 | End: 2019-11-15

## 2019-11-15 RX ORDER — OXYCODONE AND ACETAMINOPHEN 5; 325 MG/1; MG/1
1 TABLET ORAL
Status: DISCONTINUED | OUTPATIENT
Start: 2019-11-15 | End: 2019-11-23 | Stop reason: HOSPADM

## 2019-11-15 RX ADMIN — POTASSIUM CHLORIDE 40 MEQ: 750 TABLET, FILM COATED, EXTENDED RELEASE ORAL at 15:40

## 2019-11-15 RX ADMIN — ACETAMINOPHEN 650 MG: 325 TABLET, FILM COATED ORAL at 15:40

## 2019-11-15 RX ADMIN — POTASSIUM CHLORIDE 40 MEQ: 750 TABLET, FILM COATED, EXTENDED RELEASE ORAL at 12:08

## 2019-11-15 RX ADMIN — PIPERACILLIN SODIUM AND TAZOBACTAM SODIUM 3.38 G: 3; .375 INJECTION, POWDER, LYOPHILIZED, FOR SOLUTION INTRAVENOUS at 10:39

## 2019-11-15 RX ADMIN — MORPHINE SULFATE 2 MG: 2 INJECTION, SOLUTION INTRAMUSCULAR; INTRAVENOUS at 05:31

## 2019-11-15 RX ADMIN — PIPERACILLIN SODIUM AND TAZOBACTAM SODIUM 3.38 G: 3; .375 INJECTION, POWDER, LYOPHILIZED, FOR SOLUTION INTRAVENOUS at 17:55

## 2019-11-15 RX ADMIN — Medication 10 ML: at 15:35

## 2019-11-15 RX ADMIN — PIPERACILLIN SODIUM AND TAZOBACTAM SODIUM 3.38 G: 3; .375 INJECTION, POWDER, LYOPHILIZED, FOR SOLUTION INTRAVENOUS at 01:48

## 2019-11-15 RX ADMIN — OXYCODONE AND ACETAMINOPHEN 1 TABLET: 5; 325 TABLET ORAL at 16:26

## 2019-11-15 NOTE — PROGRESS NOTES
Mr. Aldo Malave feels better today. Tm 99.9 Tc 98.3 HR: 91 BP: 120/78 Resp Rate: 17 100% sat on room air. Intake/Output Summary (Last 24 hours) at 11/15/2019 0957  Last data filed at 11/15/2019 0717  Gross per 24 hour   Intake 2343.75 ml   Output 1500 ml   Net 843.75 ml   Exam: Cor: RRR. Lungs: Bilateral breath sounds. Clear to auscultation. Abd: Soft. Non distended. Non tender. No guarding or rebound. Labs:   Recent Results (from the past 12 hour(s))   CBC WITH AUTOMATED DIFF    Collection Time: 11/15/19  5:08 AM   Result Value Ref Range    WBC 18.2 (H) 4.1 - 11.1 K/uL    RBC 4.21 4.10 - 5.70 M/uL    HGB 12.7 12.1 - 17.0 g/dL    HCT 37.7 36.6 - 50.3 %    MCV 89.5 80.0 - 99.0 FL    MCH 30.2 26.0 - 34.0 PG    MCHC 33.7 30.0 - 36.5 g/dL    RDW 13.2 11.5 - 14.5 %    PLATELET 799 301 - 872 K/uL    MPV 11.0 8.9 - 12.9 FL    NRBC 0.0 0  WBC    ABSOLUTE NRBC 0.00 0.00 - 0.01 K/uL    NEUTROPHILS 91 (H) 32 - 75 %    LYMPHOCYTES 3 (L) 12 - 49 %    MONOCYTES 4 (L) 5 - 13 %    EOSINOPHILS 0 0 - 7 %    BASOPHILS 0 0 - 1 %    IMMATURE GRANULOCYTES 2 (H) 0.0 - 0.5 %    ABS. NEUTROPHILS 16.6 (H) 1.8 - 8.0 K/UL    ABS. LYMPHOCYTES 0.5 (L) 0.8 - 3.5 K/UL    ABS. MONOCYTES 0.7 0.0 - 1.0 K/UL    ABS. EOSINOPHILS 0.0 0.0 - 0.4 K/UL    ABS. BASOPHILS 0.0 0.0 - 0.1 K/UL    ABS. IMM.  GRANS. 0.4 (H) 0.00 - 0.04 K/UL    DF SMEAR SCANNED      RBC COMMENTS FINA CELLS  PRESENT        RBC COMMENTS POLYCHROMASIA  1+       METABOLIC PANEL, BASIC    Collection Time: 11/15/19  5:08 AM   Result Value Ref Range    Sodium 131 (L) 136 - 145 mmol/L    Potassium 3.0 (L) 3.5 - 5.1 mmol/L    Chloride 97 97 - 108 mmol/L    CO2 24 21 - 32 mmol/L    Anion gap 10 5 - 15 mmol/L    Glucose 110 (H) 65 - 100 mg/dL    BUN 11 6 - 20 MG/DL    Creatinine 0.68 (L) 0.70 - 1.30 MG/DL    BUN/Creatinine ratio 16 12 - 20      GFR est AA >60 >60 ml/min/1.73m2    GFR est non-AA >60 >60 ml/min/1.73m2    Calcium 7.4 (L) 8.5 - 10.1 MG/DL   PROCALCITONIN    Collection Time: 11/15/19  5:08 AM   Result Value Ref Range    Procalcitonin 16.4 ng/mL   Mr. Norma Zapata is doing well following CT guided drainage of a liver abscess and biopsy of a hepatic mass. ID input - noted. Continue Zosyn. Follow cultures. Will advance diet to low fiber. Saline lock IVF. Cardiology following. OOB, Ambulate. Labs in AM.  Will need repeat CT scan abdomen/pelvis with po/IV contrast at some point.

## 2019-11-15 NOTE — ROUTINE PROCESS
2020 Pt experiencing SOB, paged respiratory for PRN breathing treatment, put on o2 3 L/min, o2 levels at 100 
2045 Pt no longer experiencing SOB, stated he doesn't want his breathing treatment anymore.

## 2019-11-15 NOTE — PROGRESS NOTES
Cardiology Progress Note            Admit Date: 11/12/2019  Admit Diagnosis: Hyponatremia [E87.1]  Date: 11/15/2019     Time: 8:40 AM    Subjective:  Continues to feel well. Some belly tenderness from drainage. Denies CP or SOB. No edema     Assessment and Plan     1. Troponin elevation              - 0.46 -->1.18              - No associated symptoms              - EKG NSR. No evidence for ACS              - Echo results   11/12/19   ECHO ADULT COMPLETE 11/15/2019 11/15/2019    Narrative · IVC/Hepatic Veins: Mildly elevated central venous pressure (5-10 mmHg);   IVC diameter is less than 21 mm and collapses less than 50% with   respiration. · Left Ventricle: Normal cavity size, wall thickness, systolic function   (ejection fraction normal) and diastolic function. Estimated left   ventricular ejection fraction is 56 - 60%. No regional wall motion   abnormality noted. Normal left ventricular strain. Signed by: Alba Lesches, MD    - suspect non specific in this setting of sepsis, diverticulitis, dehydration, liver abscess  2. Diverticulitis              - Microperf              - Gen Surg following  3. Liver abscess   - s/p CT guided drainage   - Cx pending  4. RAFAEL              - related to sepsis and dehydration   - resolved  5. Hypokalemia   - on replacement     Continues with no CP or SOB. Echo normal.  EF 55-60% and NRWMA. Lipid profile ordered for the am.  Stable from a Cardiology  Standpoint. Will see again as needed. Will arrange follow up in our offices for stress testing to risk stratify this patient. Will place in AVS.    Cardiology attending: seen and examined. Agree with assess and plan  Continues to improve. Chest crackles left base, faint exp wheezes, cv rrr no murmur, ext no edema. Echo is normal. No further cardiac testing at this time and will see prn. fup in office for further risk stratification. Past Medical History:   Diagnosis Date    Knee pain       Social History     Tobacco Use    Smoking status: Current Every Day Smoker     Packs/day: 0.50    Smokeless tobacco: Never Used   Substance Use Topics    Alcohol use: Yes     Comment: occ    Drug use: No           Review of Systems:    [] Patient unable to provide secondary to condition    [x] All systems negative, except as checked below.   Constitutional:    []Weight Change  []Fever   []Chills   []Night Sweats  []Fatigue  []Malaise  []____  ENT/Mouth:     []Hearing Changes  []Ear Pain  []Nasal Congestion   []Sinus Pain  []Hoarseness   []Sore throat  []Rhinorrhea  []Swallowing Difficulty  []____  Eyes:    []Eye Pain  []Swelling  []Redness  []Foreign Body  []Discharge  []Vision Changes  []____  Cardiovascular:    []Chest Pain  []SOB  []PND  []JORDAN  []Orthopnea  []Claudication  []Edema   []Palpitations  []____  Respiratory:    []Cough  []Sputum  []Wheezing,  []SOB  []Hemoptysis  []____  Gastrointestinal:    []Nausea  []Vomiting  []Diarrhea  []Constipation  [x]Pain  []Heartburn  []Anorexia  []Dysphagia  []Hematochezia  []Melena,  []Jaundice  []____  Genitourinary:    []Dysuria  []Urinary Frequency  []Hematuria  []Urinary Incontinence  []Urgency  []Flank Pain  []Hesitancy  []____  Musculoskeletal:    []Arthralgias  []Myalgias  []Joint Swelling  []Joint Stiffness  []Back Pain  []Neck Pain  []____  Skin:    []Skin Lesions  []Pruritis  []Hair Changes  []Skin rashes  []____  Neuro:    []Weakness  []Numbness  []Paresthesias  []Loss of Consciousness  []Syncope   []Dizziness  []Headache  []Coordination Changes  []Recent Falls  []____  Psych:    []Anxiety/Depression  []Insomnia  []Memory Changes  []Violence/Abuse Hx.  []____  Heme/Lymph:    []Bruising  []Bleeding  []Lymphadenopathy  []____  Endocrine:    []Polyuria  []Polydipsia  []Temperature Intolerance  []____         Objective:     Physical Exam:                Visit Vitals  /78 (BP 1 Location: Right arm, BP Patient Position: Supine; At rest)   Pulse 91   Temp 98.3 °F (36.8 °C)   Resp 17   Ht 5' 11\" (1.803 m)   Wt 241 lb 10 oz (109.6 kg)   SpO2 100%   BMI 33.70 kg/m²        General Appearance:   Well developed, well nourished,alert and oriented x 3, and   individual in no acute distress. Ears/Nose/Mouth/Throat:    Hearing grossly normal.         Neck:  Supple. Chest:    Lungs scant crackles in bases L>R to auscultation bilaterally. Noted EEW   Cardiovascular:   Regular rate and rhythm, S1, S2 normal, no murmur. Abdomen:    Soft, mildly tender, bowel sounds are active. Extremities:  No edema bilaterally. Skin:  Warm and dry.      Telemetry: normal sinus rhythm     Data Review:    Labs:    Recent Results (from the past 24 hour(s))   ECHO ADULT COMPLETE    Collection Time: 11/14/19 11:40 AM   Result Value Ref Range    Aortic Valve Systolic Peak Velocity 468.36 cm/s    AoV PG 6.1 mmHg    LVIDd 5.36 4.2 - 5.9 cm    LVPWd 0.96 0.6 - 1.0 cm    LVIDs 3.95 cm    IVSd 0.96 0.6 - 1.0 cm    MVA (PHT) 3.7 cm2    MV A Morris 77.09 cm/s    MV E Morris 81.99 cm/s    MV E/A 1.06     Left Atrium to Aortic Root Ratio 0.74     RVIDd 3.84 cm    LV Mass .0 (A) 88 - 224 g    LV Mass AL Index 101.1 49 - 115 g/m2    Mitral Valve E Wave Deceleration Time 205.1 ms    Mitral Valve Pressure Half-time 59.5 ms    Left Atrium Major Axis 2.98 cm    Pulmonic Valve Max Velocity 55.78 cm/s    Ao Root D 4.01 cm    Left Ventricular Fractional Shortening by 2D 87.15876994 %    Mitral Valve Deceleration Howard [id number]     PV peak gradient 1.2 mmHg   CBC WITH AUTOMATED DIFF    Collection Time: 11/15/19  5:08 AM   Result Value Ref Range    WBC 18.2 (H) 4.1 - 11.1 K/uL    RBC 4.21 4.10 - 5.70 M/uL    HGB 12.7 12.1 - 17.0 g/dL    HCT 37.7 36.6 - 50.3 %    MCV 89.5 80.0 - 99.0 FL    MCH 30.2 26.0 - 34.0 PG    MCHC 33.7 30.0 - 36.5 g/dL    RDW 13.2 11.5 - 14.5 %    PLATELET 557 780 - 700 K/uL    MPV 11.0 8.9 - 12.9 FL    NRBC 0.0 0  WBC    ABSOLUTE NRBC 0.00 0.00 - 0.01 K/uL    NEUTROPHILS 91 (H) 32 - 75 %    LYMPHOCYTES 3 (L) 12 - 49 %    MONOCYTES 4 (L) 5 - 13 %    EOSINOPHILS 0 0 - 7 %    BASOPHILS 0 0 - 1 %    IMMATURE GRANULOCYTES 2 (H) 0.0 - 0.5 %    ABS. NEUTROPHILS 16.6 (H) 1.8 - 8.0 K/UL    ABS. LYMPHOCYTES 0.5 (L) 0.8 - 3.5 K/UL    ABS. MONOCYTES 0.7 0.0 - 1.0 K/UL    ABS. EOSINOPHILS 0.0 0.0 - 0.4 K/UL    ABS. BASOPHILS 0.0 0.0 - 0.1 K/UL    ABS. IMM. GRANS. 0.4 (H) 0.00 - 0.04 K/UL    DF SMEAR SCANNED      RBC COMMENTS FINA CELLS  PRESENT        RBC COMMENTS POLYCHROMASIA  1+       METABOLIC PANEL, BASIC    Collection Time: 11/15/19  5:08 AM   Result Value Ref Range    Sodium 131 (L) 136 - 145 mmol/L    Potassium 3.0 (L) 3.5 - 5.1 mmol/L    Chloride 97 97 - 108 mmol/L    CO2 24 21 - 32 mmol/L    Anion gap 10 5 - 15 mmol/L    Glucose 110 (H) 65 - 100 mg/dL    BUN 11 6 - 20 MG/DL    Creatinine 0.68 (L) 0.70 - 1.30 MG/DL    BUN/Creatinine ratio 16 12 - 20      GFR est AA >60 >60 ml/min/1.73m2    GFR est non-AA >60 >60 ml/min/1.73m2    Calcium 7.4 (L) 8.5 - 10.1 MG/DL   PROCALCITONIN    Collection Time: 11/15/19  5:08 AM   Result Value Ref Range    Procalcitonin 16.4 ng/mL          Radiology:        Current Facility-Administered Medications   Medication Dose Route Frequency    piperacillin-tazobactam (ZOSYN) 3.375 g in 0.9% sodium chloride (MBP/ADV) 100 mL  3.375 g IntraVENous Q8H    albuterol-ipratropium (DUO-NEB) 2.5 MG-0.5 MG/3 ML  3 mL Nebulization Q4H PRN    acetaminophen (TYLENOL) tablet 650 mg  650 mg Oral Q6H PRN    ondansetron (ZOFRAN) injection 4 mg  4 mg IntraVENous Q8H PRN    morphine injection 2 mg  2 mg IntraVENous Q4H PRN    sodium chloride (NS) flush 5-40 mL  5-40 mL IntraVENous Q8H    sodium chloride (NS) flush 5-40 mL  5-40 mL IntraVENous PRN       Joseph Nicole.  Anderson Silva MD     [facility] [address] [place] [address], [address] [place] [address] [address]   [place]   [phone]

## 2019-11-15 NOTE — PROGRESS NOTES
Bedside and Verbal shift change report given to Melania (oncoming nurse) by Kellie Farley (offgoing nurse). Report included the following information SBAR, ED Summary, Procedure Summary, Intake/Output, MAR and Cardiac Rhythm normal sinus.

## 2019-11-15 NOTE — PROGRESS NOTES
Hospitalist Progress Note  Noe Mulligan MD  Answering service: [phone] from in house phone        Date of Service:  11/15/2019  NAME:  Romina Wharton III  :  1960  MRN:  [id number]  PCP: Sallie Marcelino MD    Chief Complaint:   Chief Complaint   Patient presents with    Diarrhea    Cough         Admission Summary:     Silvia Bee is a 62 y.o. male who presented with diarrhea, abdominal pain    Interval history / Subjective:   Patient seen for Follow up of CC; sepsis  Patient seen and examined by the bedside, labs, images and notes reviewed. Much improved, denies any fevers, chills, rigors, night sweats, any worsening of abdominal pain. He continues to have mid epigastric tenderness at the site of liver biopsy. He is hungry and is tolerating diet. Discussed with nursing staff, orders reviewed. Assessment & Plan:     - Sepsis, sec to severe colonic diverticulitis with microperforation and liver abscess   status post liver biopsy 2019 by IR  Blood cultures x2no growth so far  Liver biopsy and culturesno growth so far  Significantly elevated procalcitonin level at 47, repeat level is much improved to 16.4. Morning  Continue IV Zosyn, ID recommendations noted. General surgery consult appreciated, no acute surgical indication. Diet increased to regular with low fiber diet. General surgery plans to repeat CT scan of the abdomen pelvis with contrast probably next week. Stop IV fluids.    - Lactic acidosis sec to sepsis  Resolved    Acute respiratory insufficiency, volume overload   Resolved, off supplemental oxygen. - Hyponatremia: sec to sepsis and dehydration, improving  Expected to improve with source control and IVF  Repeat BMP in am daily    -Recurrent hypokalemia, repleterepeat labs in the morning    - elevated troponin  Likely due to sepsis/tachycardia/demand supply mismatch  Cards consult noted, no ACS as per cardiology.   Echocardiogram noted, normal LVEF, cardio no follow-up with outpatient. No active CP, hx reported hx of CAD    - worsening creatinine  Renal function has improved to normal.    -Morbid obesity: Weight loss recommended    Code status: Full Code    DVT prophylaxis: SCDs    Care Plan discussed with: Patient/Family, Nurse and Consultant Gen Surg    Disposition: TBD    Hospital Problems  Never Reviewed          Codes Class Noted POA    Hyponatremia ICD-10-CM: E87.1  ICD-9-CM: 276.1  2019 Unknown                Review of Systems:   A comprehensive review of systems was negative. Physical Examination:     General appearance: Alert, awake, appears older than his stated age, comfortable  Head: Normocephalic, without obvious abnormality, atraumatic  Eyes: conjunctivae/corneas clear. PERRL. Lungs: clear to auscultation bilaterally, no rales, wheezing or rhonchi  Heart: sinus tachycardia, no murmurs, no gallops  Abdomen: Obese, soft, mild tenderness in mid epigastrium at the site of liver biopsy bowel sounds positive  Neurologic: Alert and oriented X 3, normal strength and tone. Vital Signs:    Last 24hrs VS reviewed since prior progress note. Most recent are:    Visit Vitals  /78 (BP 1 Location: Right arm, BP Patient Position: Supine; At rest)   Pulse 91   Temp 98.3 °F (36.8 °C)   Resp 17   Ht 5' 11\" (1.803 m)   Wt 109.6 kg (241 lb 10 oz)   SpO2 100%   BMI 33.70 kg/m²         Intake/Output Summary (Last 24 hours) at 11/15/2019 1102  Last data filed at 11/15/2019 1040  Gross per 24 hour   Intake 2723.75 ml   Output 2125 ml   Net 598.75 ml        Tmax:  Temp (24hrs), Av.8 °F (37.1 °C), Min:97.9 °F (36.6 °C), Max:99.9 °F (37.7 °C)      Data Review:   Data reviewed by myself:  Xr Chest Pa Lat    Result Date: 2019  INDICATION:  febrile illness Exam: Chest 2 views. Comparison: 3/22/2017.  Findings: Cardiomediastinal silhouette is normal. Pulmonary vasculature is slightly prominent and increased since the prior study. Mild interstitial prominence predominantly at the lung bases with minimal left basilar atelectasis. No pneumothorax or focal consolidation. . Chronic right-sided rib fractures. Impression: 1. Mild prominence of the central pulmonary vasculature with minimal interstitial prominence at the lung bases. Rachelle Cabrera developing mild interstitial edema as opposed to atypical infection     Ct Chest Wo Cont    Result Date: 11/12/2019  EXAM:  CT thorax without contrast INDICATION:  Cough. COMPARISON: Chest radiographs 38/94/4599 TECHNIQUE: Helical CT of the chest is performed without intravenous contrast. Coronal and sagittal reformatted images are obtained. CT dose reduction was achieved through use of a standardized protocol tailored for this examination and automatic exposure control for dose modulation. Adaptive statistical iterative reconstruction (ASIR) was utilized. FINDINGS: The visualized thyroid gland is unremarkable. The aorta and main pulmonary artery are normal in caliber. There is coronary artery atherosclerosis. The heart size is normal.  There is no pericardial effusion. There are no enlarged axillary, mediastinal, or hilar lymph nodes. There are minimal subpleural reticular opacities in the lower lungs. There is minimal left basilar scarring or atelectasis. There is no suspicious lung nodule, mass, or consolidation. In the limited images of the upper abdomen, the partially imaged solid organs are unremarkable. Degenerative changes in the spine. There is no aggressive bony lesion. IMPRESSION: Minimal interstitial changes in the lower lungs with minimal left basilar scarring or atelectasis. No evidence for atypical infection or other acute abnormality. Ct Abd Pelv Wo Cont    Result Date: 11/12/2019  EXAM: CT ABD PELV WO CONT INDICATION: abd pain COMPARISON: CT chest 11/12/2019 CONTRAST:  None. TECHNIQUE: Thin axial images were obtained through the abdomen and pelvis.  Coronal and sagittal reconstructions were generated. Oral contrast was not administered. CT dose reduction was achieved through use of a standardized protocol tailored for this examination and automatic exposure control for dose modulation. The absence of intravenous contrast material reduces the sensitivity for evaluation of the solid parenchymal organs of the abdomen. FINDINGS: LUNG BASES: There is mild bibasilar atelectasis. INCIDENTALLY IMAGED HEART AND MEDIASTINUM: Unremarkable. LIVER: Diffuse fatty infiltration of the liver. GALLBLADDER: Unremarkable. SPLEEN: No mass. PANCREAS: No mass or ductal dilatation. ADRENALS: Unremarkable. KIDNEYS/URETERS: No mass, calculus, or hydronephrosis. STOMACH: Unremarkable. SMALL BOWEL: No bowel obstruction or perforation COLON: Extensive diverticulosis of the colon. There is bowel wall thickening of the sigmoid colon with extensive diverticula as well as pericolonic inflammation, but no focal fluid collections. There is also a small amount of gas adjacent to the affected loop of sigmoid colon, which may be extraluminal. There is no free intraperitoneal air. APPENDIX: Unremarkable. PERITONEUM: No ascites RETROPERITONEUM: No lymphadenopathy or aortic aneurysm. REPRODUCTIVE ORGANS: The prostate is noted. URINARY BLADDER: No mass or calculus. BONES: No destructive bone lesion. ADDITIONAL COMMENTS: There is degenerative disc disease which is most advanced at L4-L5. IMPRESSION: 1. Concentric bowel wall thickening of a 7 cm segment of sigmoid colon, where there are extensive diverticula and mild pericolonic inflammation. This is consistent with colitis, which may be due to diverticulitis. Notably, there is a small amount of extraluminal gas suspected, immediately adjacent to the affected segment of sigmoid colon. This may represent a localized perforation. 2. No evidence of abscess or bowel obstruction. 3. Diffuse fatty infiltration of the liver.     [facility]    Result Date: 11/12/2019  INDICATION: Elevated LFTs COMPARISON:  CT scan earlier today FINDINGS: Limited right upper quadrant ultrasound was performed. The liver is diffusely increased in echogenicity and enlarged. Within the left lobe of the liver, there is an ill-defined, mixed echogenicity mass, measuring 5.6 x 5.5 x 3.6 cm. . The common bile duct is normal measuring 3 mm in diameter. The gallbladder is normal. The right kidney measures 11.8 cm in length. IMPRESSION: 1. Enlarged, echogenic liver, compatible with diffuse fatty infiltration. 2. Complex mass in the left lobe of the liver measuring 5.6 cm. 3. No biliary ductal dilatation. Further evaluation with liver mass protocol MRI or CT is recommended. Xr Chest Port    Result Date: 11/13/2019  PORTABLE CHEST RADIOGRAPH/S: 11/13/2019 4:53 AM Clinical history: Wheezing and dyspnea INDICATION:   wheeze, sob COMPARISON: 11/12/2019 FINDINGS: AP portable upright view of the chest demonstrates a stable  cardiopericardial silhouette. The lungs are adequately expanded. Minimal interstitial edema. No pleural effusion or pneumothorax. The osseous structures are unremarkable. Patient is on a cardiac monitor. IMPRESSION: Minimal interstitial edema. No significant change. .     No results found for: SDES  Lab Results   Component Value Date/Time    Culture result: NO GROWTH AFTER 15 HOURS 11/13/2019 04:15 PM    Culture result: PENDING 11/13/2019 04:00 PM    Culture result: NO GROWTH AFTER 15 HOURS 11/13/2019 03:30 PM     All Micro Results     Procedure Component Value Units Date/Time    CULTURE, Judy Mendoza STAIN [[id number]] Collected:  11/13/19 1530    Order Status:  Completed Specimen:  Abscess Updated:  11/14/19 1100     Special Requests: LIVER        GRAM STAIN 3+ WBCS SEEN         NO ORGANISMS SEEN        Culture result: NO GROWTH AFTER 15 HOURS       CULTURE, ANAEROBIC [[id number]] Collected:  11/13/19 1600    Order Status:  Completed Specimen:  Abscess Updated:  11/14/19 1057     Special Requests: LIVER     Culture result: PENDING    CULTURE, Agus Mclaughlin STAIN [[id number]] Collected:  11/13/19 1615    Order Status:  Completed Specimen:  Abscess Updated:  11/14/19 1056     Special Requests: LIVER        GRAM STAIN 2+ WBCS SEEN         NO ORGANISMS SEEN        Culture result: NO GROWTH AFTER 15 HOURS       CULTURE, BLOOD, PAIRED [[id number]] Collected:  11/12/19 0803    Order Status:  Completed Specimen:  Blood Updated:  11/14/19 0952     Special Requests: NO SPECIAL REQUESTS        Culture result: NO GROWTH 1 DAY       CULTURE, TISSUE Roxana Pimple STAIN [[id number]] Collected:  11/13/19 1600    Order Status:  Canceled Specimen:  Liver     CULTURE, BODY FLUID W Renford Chamber [[id number]] Collected:  11/13/19 1600    Order Status:  Canceled Specimen:  Miscellaneous Fluid     LEGIONELLA PNEUMOPHILA AG, URINE [[id number]] Collected:  11/12/19 1540    Order Status:  Completed Specimen:  Urine Updated:  11/13/19 1536     Source URINE        L pneumophila S1 Ag, urine NEGATIVE         Comment: (NOTE)  Presumptive negative for L. pneumophila serogroup 1 antigen in urine,  suggesting no recent or current infection. Legionnaires' disease  cannot be ruled out since other serogroups and species may also  cause disease. Performed At: [address] [id number]  Silvia Diamond MD CX:[id number]         C. DIFFICILE AG & TOXIN A/B [[id number]] Collected:  11/12/19 1715    Order Status:  Canceled Specimen:  Stool     ENTERIC BACT. Erlin Tom [[id number]] Collected:  11/12/19 1715    Order Status:  Canceled     URINE CULTURE HOLD SAMPLE [[id number]] Collected:  11/12/19 1540    Order Status:  Completed Specimen:  Urine from Serum Updated:  11/12/19 1551     Urine culture hold       URINE ON HOLD IN MICROBIOLOGY DEPT FOR 3 DAYS. IF UNPRESERVED URINE IS SUBMITTED, IT CANNOT BE USED FOR ADDITIONAL TESTING AFTER 24 HRS, RECOLLECTION WILL BE REQUIRED.           INFLUENZA A & B AG (RAPID TEST) [[id number]] Collected:  11/12/19 1312    Order Status:  Completed Specimen:  Nasopharyngeal from Nasal washing Updated:  11/12/19 1333     Influenza A Antigen NEGATIVE         Influenza B Antigen NEGATIVE              Labs: reviewed by myself. Recent Labs     11/15/19  0508 11/14/19 0443   WBC 18.2* 13.6*   HGB 12.7 12.2   HCT 37.7 36.4*    113*     Recent Labs     11/15/19  0508 11/14/19  0443 11/13/19  0357   * 133* 127*   K 3.0* 2.9* 3.4*   CL 97 100 102   CO2 24 26 16*   BUN 11 14 12   CREA 0.68* 0.79 1.11   * 110* 129*   CA 7.4* 7.6* 7.7*   MG  --  2.1  --    PHOS  --  3.2  --      Recent Labs     11/14/19  0443 11/12/19  1312   SGOT 100* 117*   * 147*   * 161*   TBILI 0.9 1.0   TP 5.1* 6.7   ALB 2.0* 2.6*   GLOB 3.1 4.1*   LPSE  --  85     Recent Labs     11/12/19 1858   INR 1.3*   PTP 12.9*      No results for input(s): FE, TIBC, PSAT, FERR in the last 72 hours. No results found for: FOL, RBCF   No results for input(s): PH, PCO2, PO2 in the last 72 hours.   Recent Labs     11/13/19  1051 11/13/19  0357 11/12/19 1858   TROIQ 1.18* 0.46* <0.05     No results found for: CHOL, CHOLX, CHLST, CHOLV, HDL, HDLP, LDL, LDLC, DLDLP, TGLX, TRIGL, TRIGP, CHHD, CHHDX  No results found for: [facility]  Lab Results   Component Value Date/Time    Color DARK YELLOW 11/12/2019 03:40 PM    Appearance CLOUDY (A) 11/12/2019 03:40 PM    Specific gravity 1.023 11/12/2019 03:40 PM    pH (UA) 6.5 11/12/2019 03:40 PM    Protein 100 (A) 11/12/2019 03:40 PM    Glucose 100 (A) 11/12/2019 03:40 PM    Ketone TRACE (A) 11/12/2019 03:40 PM    Urobilinogen 4.0 (H) 11/12/2019 03:40 PM    Nitrites NEGATIVE  11/12/2019 03:40 PM    Leukocyte Esterase NEGATIVE  11/12/2019 03:40 PM    Epithelial cells FEW 11/12/2019 03:40 PM    Bacteria NEGATIVE  11/12/2019 03:40 PM    WBC 0-4 11/12/2019 03:40 PM    RBC 0-5 11/12/2019 03:40 PM         Medications Reviewed:     Current Facility-Administered Medications Medication Dose Route Frequency    piperacillin-tazobactam (ZOSYN) 3.375 g in 0.9% sodium chloride (MBP/ADV) 100 mL  3.375 g IntraVENous Q8H    albuterol-ipratropium (DUO-NEB) 2.5 MG-0.5 MG/3 ML  3 mL Nebulization Q4H PRN    acetaminophen (TYLENOL) tablet 650 mg  650 mg Oral Q6H PRN    ondansetron (ZOFRAN) injection 4 mg  4 mg IntraVENous Q8H PRN    morphine injection 2 mg  2 mg IntraVENous Q4H PRN    sodium chloride (NS) flush 5-40 mL  5-40 mL IntraVENous Q8H    sodium chloride (NS) flush 5-40 mL  5-40 mL IntraVENous PRN     ______________________________________________________________________  EXPECTED LENGTH OF STAY: 4d 19h  ACTUAL LENGTH OF STAY:          3                 Everton Tejeda MD     Patient's emergency contacts:  Extended Emergency Contact Information  Primary Emergency Contact: None, Given  Home Phone: [phone]  Relation: Other Non-relative  Secondary Emergency Contact: Tamanna Davis  Mobile Phone: [phone]  Relation: Brother

## 2019-11-15 NOTE — CONSULTS
Infectious Disease Consult Note    Reason for Consult: Liver abscess, diverticulitis with microperforation  Date of Consultation: November 15, 2019  Date of Admission: 11/12/2019  Referring Physician: Lisa Sandhu MD      HPI:       Mr Nicole Mercedes is a 24-year-old gentleman reports no significant past medical history admitted on 11/12/2019 with abdominal pain and diarrhea. He reports having a birthday on [birth year] and made a large pot of spaghetti. That evening he noticed he had explosive diarrhea. Started experiencing abdominal pain and started feeling very sick and therefore presented to the hospital.  Found to have diverticulitis with microperforation and work-up also showed liver abscess/mass . He had this drained by CT-guided biopsy/drainage on 11/13/2019. He says he feels better but still very tired and recuperating from his recent illness. Says diarrhea is resolved . Denies any fevers chills or night sweats ongoing . Has started eating. Overall improved . Tolerating antibiotics without any issues at this time. From chart review, his T-max is 99.9. He has leukocytosis that is uptrending to 18.2. His renal function is normal.  His LFTs are elevated but trending down. Past Medical History:  Past Medical History:   Diagnosis Date    Knee pain          Surgical History:  Past Surgical History:   Procedure Laterality Date    HX GI      hernia repair    HX HEENT      tonsils removed    HX ORTHOPAEDIC      right knee surgery         Family History:   History reviewed. No pertinent family history.       Social History:     · Living Situation: Lives at home, is a semiretired and used to be an   · Tobacco: Smoker  · Alcohol: Denied alcohol  · Illicit Drugs: Denied illicit drugs  · Sexual History: Past  · Travel History  denied  · Exposures:   · Outdoor/Hiking: denied  · Animal/Pet: has Dogs/ Cats   · Construction: denied  · Hot Tub: denied   · Brackish/stagnant exposure: denied  · TB exposure: denied  · Sick Contacts: denied     Allergies:  No Known Allergies      Review of Systems:     A full 10 point review of systems obtained in per HPI. All others negative at this time     Medications:  No current facility-administered medications on file prior to encounter. No current outpatient medications on file prior to encounter.          Current Facility-Administered Medications:     potassium chloride SR (KLOR-CON 10) tablet 40 mEq, 40 mEq, Oral, Q4H, Naheed Sahu MD, 40 mEq at 11/15/19 1208    piperacillin-tazobactam (ZOSYN) 3.375 g in 0.9% sodium chloride (MBP/ADV) 100 mL, 3.375 g, IntraVENous, Q8H, Dee Dee Atwood MD, Last Rate: 25 mL/hr at 11/15/19 1039, 3.375 g at 11/15/19 1039    albuterol-ipratropium (DUO-NEB) 2.5 MG-0.5 MG/3 ML, 3 mL, Nebulization, Q4H PRN, Naheed Sahu MD    acetaminophen (TYLENOL) tablet 650 mg, 650 mg, Oral, Q6H PRN, Naheed Sahu MD, 650 mg at 11/14/19 1349    ondansetron (ZOFRAN) injection 4 mg, 4 mg, IntraVENous, Q8H PRN, Naheed Sahu MD    morphine injection 2 mg, 2 mg, IntraVENous, Q4H PRN, Naheed Sahu MD, 2 mg at 11/15/19 0531    sodium chloride (NS) flush 5-40 mL, 5-40 mL, IntraVENous, Q8H, Santi Ramirez MD, 10 mL at 11/14/19 2339    sodium chloride (NS) flush 5-40 mL, 5-40 mL, IntraVENous, PRN, Angelic Benitez MD      Physical Exam:    Vitals:   Patient Vitals for the past 24 hrs:   Temp Pulse Resp BP SpO2   11/15/19 1208 97.8 °F (36.6 °C) (!) 103 18 124/87 100 %   11/15/19 1117  99   100 %   11/15/19 0907  91   100 %   11/15/19 0816 98.3 °F (36.8 °C) 100 17 120/78 100 %   11/15/19 0627 98.5 °F (36.9 °C) (!) 109 24 118/70 100 %   11/15/19 0533 99.9 °F (37.7 °C) (!) 110 26 127/72 100 %   11/15/19 0514 99.8 °F (37.7 °C) (!) 114 22 121/84 100 %   11/14/19 2342 99 °F (37.2 °C) 100 23 125/86 100 %   11/14/19 1931 98.5 °F (36.9 °C) 100 (!) 33 (!) 147/92 100 %   11/14/19 1553 98.6 °F (37 °C) 98 18 126/75 100 % ·   · GEN: NAD, sitting in chair  · HEENT: Normocephalic, atraumatic, , no scleral icterus,  no cervical lymphadenopathy, no sinus tenderness, no thrush, missing tooth in the front  · CV: S1, S2 heard regularly,  · Lungs: Clear to auscultation bilaterally  · Abdomen: soft, non distended, non tender, plus bandage noted , no rash   · Extremities: no edema  · Neuro: Alert, oriented to  self, place and situation, moves all extremities to commands, verbal   · Skin: no rash  · Psych: good affect, good eye contact, non tearful   ·  musculoskeletal no edema or erythema of the knees or ankles noted      Labs:   Recent Results (from the past 24 hour(s))   CBC WITH AUTOMATED DIFF    Collection Time: 11/15/19  5:08 AM   Result Value Ref Range    WBC 18.2 (H) 4.1 - 11.1 K/uL    RBC 4.21 4.10 - 5.70 M/uL    HGB 12.7 12.1 - 17.0 g/dL    HCT 37.7 36.6 - 50.3 %    MCV 89.5 80.0 - 99.0 FL    MCH 30.2 26.0 - 34.0 PG    MCHC 33.7 30.0 - 36.5 g/dL    RDW 13.2 11.5 - 14.5 %    PLATELET 780 145 - 336 K/uL    MPV 11.0 8.9 - 12.9 FL    NRBC 0.0 0  WBC    ABSOLUTE NRBC 0.00 0.00 - 0.01 K/uL    NEUTROPHILS 91 (H) 32 - 75 %    LYMPHOCYTES 3 (L) 12 - 49 %    MONOCYTES 4 (L) 5 - 13 %    EOSINOPHILS 0 0 - 7 %    BASOPHILS 0 0 - 1 %    IMMATURE GRANULOCYTES 2 (H) 0.0 - 0.5 %    ABS. NEUTROPHILS 16.6 (H) 1.8 - 8.0 K/UL    ABS. LYMPHOCYTES 0.5 (L) 0.8 - 3.5 K/UL    ABS. MONOCYTES 0.7 0.0 - 1.0 K/UL    ABS. EOSINOPHILS 0.0 0.0 - 0.4 K/UL    ABS. BASOPHILS 0.0 0.0 - 0.1 K/UL    ABS. IMM.  GRANS. 0.4 (H) 0.00 - 0.04 K/UL    DF SMEAR SCANNED      RBC COMMENTS FINA CELLS  PRESENT        RBC COMMENTS POLYCHROMASIA  1+       METABOLIC PANEL, BASIC    Collection Time: 11/15/19  5:08 AM   Result Value Ref Range    Sodium 131 (L) 136 - 145 mmol/L    Potassium 3.0 (L) 3.5 - 5.1 mmol/L    Chloride 97 97 - 108 mmol/L    CO2 24 21 - 32 mmol/L    Anion gap 10 5 - 15 mmol/L    Glucose 110 (H) 65 - 100 mg/dL    BUN 11 6 - 20 MG/DL    Creatinine 0.68 (L) 0.70 - 1.30 MG/DL    BUN/Creatinine ratio 16 12 - 20      GFR est AA >60 >60 ml/min/1.73m2    GFR est non-AA >60 >60 ml/min/1.73m2    Calcium 7.4 (L) 8.5 - 10.1 MG/DL   PROCALCITONIN    Collection Time: 11/15/19  5:08 AM   Result Value Ref Range    Procalcitonin 16.4 ng/mL       Microbiology Data:       Blood: 11/12/19  Component Value Ref Range & Units Status   Special Requests: NO SPECIAL REQUESTS    Preliminary   Culture result: NO GROWTH 2 DAYS    Preliminary   Result History           Abscess 11/13/19   Specimen Information: Abscess        Component Value Ref Range & Units Status   Special Requests: LIVER    Preliminary   GRAM STAIN 2+ WBCS SEEN    Preliminary   GRAM STAIN NO ORGANISMS SEEN    Preliminary   Culture result: NO GROWTH AFTER 15 HOURS         Specimen Information: Abscess        Component   Special Requests:   LIVER    Culture result:   PENDING    Result History     Specimen Information: Abscess        Component Value Ref Range & Units Status   Special Requests: LIVER    Preliminary   GRAM STAIN 3+ WBCS SEEN    Preliminary   GRAM STAIN NO ORGANISMS SEEN    Preliminary   Culture result:   Preliminary   NO GROWTH IN 2 DAYS, AEROBICALLY    Result History           Pathology Results:       No definite organisms identified on routine stains. Recommend correlation with pending microbiology testing. Amoebic infections may also result in liver abscess. Depending on clinical information, consider serologic testing Addendum Electronically Signed Out By Mera Singletary MD   [facility]/11/15/2019         Specimen Source   1: Liver, Core biopsy with touch intepretation:   CYTOLOGIC INTERPRETATION:   1.  Liver, Core biopsy with touch intepretation:   Liver parenchyma with acute and chronic inflammation and macrovesicular steatosis   See comment   General Categorization   No cells diagnostic for malignancy   Specimen Adequacy   Satisfactory for evaluation     Imaging:   US ABD 11/12/19  COMPARISON:  CT scan earlier today     FINDINGS: Limited right upper quadrant ultrasound was performed. The liver is  diffusely increased in echogenicity and enlarged. Within the left lobe of the  liver, there is an ill-defined, mixed echogenicity mass, measuring 5.6 x 5.5 x  3.6 cm. . The common bile duct is normal measuring 3 mm in diameter. The  gallbladder is normal. The right kidney measures 11.8 cm in length.     IMPRESSION  IMPRESSION:      1. Enlarged, echogenic liver, compatible with diffuse fatty infiltration. 2. Complex mass in the left lobe of the liver measuring 5.6 cm.  3. No biliary ductal dilatation.     Further evaluation with liver mass protocol MRI or CT is recommended. CT ABD 11/13/19  FINDINGS:      LIVER: There is a 6.5 cm multiloculated low density cystic lesion in segment 2/3  of the left hepatic lobe concerning for abscess versus cystic neoplasm. Small  nonspecific low-density focus in the right hepatic lobe measuring 3.4 x 1.1 cm  (series 6, image 40). No other focal liver abnormality. No biliary ductal  dilatation   GALLBLADDER: Probable small stones but otherwise unremarkable in appearance. SPLEEN: Borderline splenomegaly  PANCREAS: No mass or ductal dilatation. ADRENALS: Unremarkable. KIDNEYS/URETERS: Symmetric nephrograms without evidence for focal mass. No  hydronephrosis. PERITONEUM: Borderline enlarged para-aortic retroperitoneal lymph nodes. No  other evidence for abdominal lymphadenopathy. No ascites. COLON: Inflammatory changes in the sigmoid colon are stable, likely related to  diverticulitis. Stable probable contained microperforation along the posterior  wall of the sigmoid colon (image 147). No free intraperitoneal gas. No evidence  for pericolonic abscess. APPENDIX: Unremarkable. SMALL BOWEL: No dilatation or wall thickening. STOMACH: Unremarkable. PELVIS: No pelvic lymphadenopathy. Trace pericolonic free fluid. The bladder is  unremarkable. BONES: No destructive bone lesion.  Mild to moderate lumbosacral spondylosis. VISUALIZED THORAX: Bibasilar subsegmental atelectasis  ADDITIONAL COMMENTS: N/A     IMPRESSION  IMPRESSION:     6 cm multiloculated low density/cystic lesion in the left hepatic lobe may  represent abscess in the appropriate clinical picture. Cystic neoplasm is  another possibility. There is a smaller 3 cm nonspecific low-density focus in  the right hepatic lobe.     Stable inflammatory changes involving the sigmoid colon. CT 11/12/19  COMPARISON: CT chest 11/12/2019     CONTRAST:  None.     TECHNIQUE:   Thin axial images were obtained through the abdomen and pelvis. Coronal and  sagittal reconstructions were generated. Oral contrast was not administered. CT  dose reduction was achieved through use of a standardized protocol tailored for  this examination and automatic exposure control for dose modulation.      The absence of intravenous contrast material reduces the sensitivity for  evaluation of the solid parenchymal organs of the abdomen.      FINDINGS:   LUNG BASES: There is mild bibasilar atelectasis. INCIDENTALLY IMAGED HEART AND MEDIASTINUM: Unremarkable. LIVER: Diffuse fatty infiltration of the liver. GALLBLADDER: Unremarkable. SPLEEN: No mass. PANCREAS: No mass or ductal dilatation. ADRENALS: Unremarkable. KIDNEYS/URETERS: No mass, calculus, or hydronephrosis. STOMACH: Unremarkable. SMALL BOWEL: No bowel obstruction or perforation  COLON: Extensive diverticulosis of the colon. There is bowel wall thickening of  the sigmoid colon with extensive diverticula as well as pericolonic  inflammation, but no focal fluid collections. There is also a small amount of  gas adjacent to the affected loop of sigmoid colon, which may be extraluminal.  There is no free intraperitoneal air. APPENDIX: Unremarkable. PERITONEUM: No ascites  RETROPERITONEUM: No lymphadenopathy or aortic aneurysm. REPRODUCTIVE ORGANS: The prostate is noted.   URINARY BLADDER: No mass or calculus. BONES: No destructive bone lesion. ADDITIONAL COMMENTS: There is degenerative disc disease which is most advanced  at L4-L5.     IMPRESSION  IMPRESSION:     1. Concentric bowel wall thickening of a 7 cm segment of sigmoid colon, where  there are extensive diverticula and mild pericolonic inflammation. This is  consistent with colitis, which may be due to diverticulitis. Notably, there is a  small amount of extraluminal gas suspected, immediately adjacent to the affected  segment of sigmoid colon. This may represent a localized perforation. 2. No evidence of abscess or bowel obstruction. 3. Diffuse fatty infiltration of the liver. FINDINGS:   The visualized thyroid gland is unremarkable. The aorta and main pulmonary  artery are normal in caliber. There is coronary artery atherosclerosis. The  heart size is normal.  There is no pericardial effusion.       There are no enlarged axillary, mediastinal, or hilar lymph nodes.      There are minimal subpleural reticular opacities in the lower lungs. There is  minimal left basilar scarring or atelectasis. There is no suspicious lung  nodule, mass, or consolidation.     In the limited images of the upper abdomen, the partially imaged solid organs  are unremarkable. Degenerative changes in the spine. There is no aggressive bony  lesion.     IMPRESSION  IMPRESSION:   Minimal interstitial changes in the lower lungs with minimal left basilar  scarring or atelectasis. No evidence for atypical infection or other acute  abnormality. TTE  Left Ventricle Normal cavity size, wall thickness, systolic function (ejection fraction normal) and diastolic function. The muscle mass is normal. The cavity shape is normal. The estimated ejection fraction is 56 - 60%. No regional wall motion abnormality noted. End-systolic volume is normal. Normal left ventricular strain. Normal left ventricular diastolic pressure.  End-diastolic volume is normal.   Wall Scoring The left ventricular wall motion is normal.            Left Atrium Normal cavity size. No atrial septal defect present. Right Ventricle Normal cavity size, wall thickness and global systolic function. Right Atrium Normal cavity size. Interatrial Septum No atrial septal defect present. No interatrial septal aneurysm present. .   Aortic Valve Normal valve structure, trileaflet valve structure, no stenosis and no regurgitation. Mitral Valve Normal valve structure, no stenosis and no regurgitation. Tricuspid Valve Normal valve structure, no stenosis and no regurgitation. Pulmonic Valve Pulmonic valve not well visualized. Normal valve structure, no stenosis and no regurgitation. Aorta Normal aortic root, ascending aortic, and aortic arch. IVC/Hepatic Veins Mildly elevated central venous pressure (5-10 mmHg); IVC diameter is less than 21 mm and collapses less than 50% with respiration. Procedures:     05/19/26  Uncomplicated CT-guided left lobe liver mass biopsy and aspiration      Assessment / Plan:      Mr Aurora Quinonez is a 66-year-old gentleman reports no significant past medical history admitted on 11/12/2019 with abdominal pain and diarrhea. He reports having a birthday on [birth year] and made a large pot of spaghetti. That evening he noticed he had explosive diarrhea. Started experiencing abdominal pain and started feeling very sick and therefore presented to the hospital.  Found to have diverticulitis with microperforation and work-up also showed liver abscess/mass . He had this drained by CT-guided biopsy/drainage on 11/13/2019. He says he feels better but still very tired and recuperating from his recent illness. Says diarrhea is resolved . Denies any fevers chills or night sweats ongoing . Has started eating. Overall improved . Tolerating antibiotics without any issues at this time. From chart review, his T-max is 99.9. He has leukocytosis that is uptrending to 18.2.   His renal function is normal.  His LFTs are elevated but trending down. 1) liver abscess  Status post drainage on 11/13/2019  Cultures pending  Blood cultures negative on admission  Continue IV Zosyn renally dosed by pharmacy  White count is up trending and will monitor closely if it continues to rise may need to change to carbapenem , or alter antibiotics , assess for source control /repeat CT abdomen   Monitor LFTs  Duration of antibiotics depending on his course  Usually liver abscesses are treated until they have resolved for 4 to 6 weeks with clinical monitoring as well as radiographic monitoring to assess response to treatment    2) diverticulitis with microperforation  Evaluated by surgery  On IV Zosyn    3) DVT PX               Thank for the opportunity to participate in the care of this patient. Please contact with questions or concerns.      Nely Alvarado DO  2:22 PM

## 2019-11-15 NOTE — PROGRESS NOTES
NUTRITION EDUCATION       Nutrition Assessment:   Pt with colonic diverticulitis with microperforation and liver abscess. Low fiber diet education consult received. Pt given low fiber MNT hand out. Discussed low fiber diet. Pt seems to mostly eat low fiber on a normal bases, getting canned fruits and vegetables. Discussed slowly adding fiber after 2-4 weeks. Noted pt will likely get repeat CT scan with contrast next week. Pt agreeable to education. Pt states breakfast and lunch both went well, without Gi distress, no BM, no pain, no n/v.     Nutrition Diagnoses:  Nutrition/food-related knowledge deficit related to no prior diet education as evidenced by new condition (diverticulitis) requiring diet education (low fiber). Intervention:   1. Brief Nutrition education including identification of low fiber foods and reading food labels. 2. Nutrition Counseling including strategies for behavior modification, goal setting and planning. Monitoring/Evaluation: Pt expressed understanding of education topics and acknowledged ability in applying diet goals. Pt answered questions posed to demonstrate learning.      Lorie Mai, ANASTASIA  Pager: [phone]

## 2019-11-16 LAB
ANION GAP SERPL CALC-SCNC: 7 MMOL/L (ref 5–15)
BASOPHILS # BLD: 0 K/UL (ref 0–0.1)
BASOPHILS NFR BLD: 0 % (ref 0–1)
BUN SERPL-MCNC: 9 MG/DL (ref 6–20)
BUN/CREAT SERPL: 13 (ref 12–20)
CALCIUM SERPL-MCNC: 7.6 MG/DL (ref 8.5–10.1)
CHLORIDE SERPL-SCNC: 99 MMOL/L (ref 97–108)
CHOLEST SERPL-MCNC: 83 MG/DL
CO2 SERPL-SCNC: 26 MMOL/L (ref 21–32)
CREAT SERPL-MCNC: 0.72 MG/DL (ref 0.7–1.3)
DIFFERENTIAL METHOD BLD: ABNORMAL
EOSINOPHIL # BLD: 0.1 K/UL (ref 0–0.4)
EOSINOPHIL NFR BLD: 1 % (ref 0–7)
ERYTHROCYTE [DISTWIDTH] IN BLOOD BY AUTOMATED COUNT: 13.2 % (ref 11.5–14.5)
GLUCOSE SERPL-MCNC: 120 MG/DL (ref 65–100)
HCT VFR BLD AUTO: 39.5 % (ref 36.6–50.3)
HDLC SERPL-MCNC: 9 MG/DL
HDLC SERPL: 9.2 {RATIO} (ref 0–5)
HGB BLD-MCNC: 13.3 G/DL (ref 12.1–17)
IMM GRANULOCYTES # BLD AUTO: 0.3 K/UL (ref 0–0.04)
IMM GRANULOCYTES NFR BLD AUTO: 2 % (ref 0–0.5)
LDLC SERPL CALC-MCNC: 44.2 MG/DL (ref 0–100)
LIPID PROFILE,FLP: ABNORMAL
LYMPHOCYTES # BLD: 0.8 K/UL (ref 0.8–3.5)
LYMPHOCYTES NFR BLD: 5 % (ref 12–49)
MCH RBC QN AUTO: 30 PG (ref 26–34)
MCHC RBC AUTO-ENTMCNC: 33.7 G/DL (ref 30–36.5)
MCV RBC AUTO: 89.2 FL (ref 80–99)
MONOCYTES # BLD: 0.8 K/UL (ref 0–1)
MONOCYTES NFR BLD: 5 % (ref 5–13)
NEUTS SEG # BLD: 12.8 K/UL (ref 1.8–8)
NEUTS SEG NFR BLD: 87 % (ref 32–75)
NRBC # BLD: 0 K/UL (ref 0–0.01)
NRBC BLD-RTO: 0 PER 100 WBC
PLATELET # BLD AUTO: 174 K/UL (ref 150–400)
PMV BLD AUTO: 10.8 FL (ref 8.9–12.9)
POTASSIUM SERPL-SCNC: 3.5 MMOL/L (ref 3.5–5.1)
RBC # BLD AUTO: 4.43 M/UL (ref 4.1–5.7)
SODIUM SERPL-SCNC: 132 MMOL/L (ref 136–145)
TRIGL SERPL-MCNC: 149 MG/DL (ref ?–150)
VLDLC SERPL CALC-MCNC: 29.8 MG/DL
WBC # BLD AUTO: 14.8 K/UL (ref 4.1–11.1)

## 2019-11-16 PROCEDURE — 36415 COLL VENOUS BLD VENIPUNCTURE: CPT

## 2019-11-16 PROCEDURE — 74011000258 HC RX REV CODE- 258: Performed by: SURGERY

## 2019-11-16 PROCEDURE — 74011250636 HC RX REV CODE- 250/636: Performed by: SURGERY

## 2019-11-16 PROCEDURE — 65270000032 HC RM SEMIPRIVATE

## 2019-11-16 PROCEDURE — 74011250637 HC RX REV CODE- 250/637: Performed by: INTERNAL MEDICINE

## 2019-11-16 PROCEDURE — 80061 LIPID PANEL: CPT

## 2019-11-16 PROCEDURE — 85025 COMPLETE CBC W/AUTO DIFF WBC: CPT

## 2019-11-16 PROCEDURE — 80048 BASIC METABOLIC PNL TOTAL CA: CPT

## 2019-11-16 RX ADMIN — Medication 1 CAPSULE: at 08:14

## 2019-11-16 RX ADMIN — PIPERACILLIN SODIUM AND TAZOBACTAM SODIUM 3.38 G: 3; .375 INJECTION, POWDER, LYOPHILIZED, FOR SOLUTION INTRAVENOUS at 17:49

## 2019-11-16 RX ADMIN — OXYCODONE AND ACETAMINOPHEN 1 TABLET: 5; 325 TABLET ORAL at 23:16

## 2019-11-16 RX ADMIN — OXYCODONE AND ACETAMINOPHEN 1 TABLET: 5; 325 TABLET ORAL at 02:59

## 2019-11-16 RX ADMIN — OXYCODONE AND ACETAMINOPHEN 1 TABLET: 5; 325 TABLET ORAL at 17:58

## 2019-11-16 RX ADMIN — Medication 10 ML: at 02:53

## 2019-11-16 RX ADMIN — PIPERACILLIN SODIUM AND TAZOBACTAM SODIUM 3.38 G: 3; .375 INJECTION, POWDER, LYOPHILIZED, FOR SOLUTION INTRAVENOUS at 02:52

## 2019-11-16 RX ADMIN — Medication 10 ML: at 06:39

## 2019-11-16 RX ADMIN — PIPERACILLIN SODIUM AND TAZOBACTAM SODIUM 3.38 G: 3; .375 INJECTION, POWDER, LYOPHILIZED, FOR SOLUTION INTRAVENOUS at 09:30

## 2019-11-16 RX ADMIN — OXYCODONE AND ACETAMINOPHEN 1 TABLET: 5; 325 TABLET ORAL at 12:47

## 2019-11-16 NOTE — ROUTINE PROCESS
Bedside and Verbal shift change report given to Rowan (oncoming nurse) by Moriah Harper (offgoing nurse). Report included the following information SBAR, Kardex, OR Summary, Intake/Output, MAR, Recent Results and Cardiac Rhythm Sinus Tach/NSR.

## 2019-11-16 NOTE — PROGRESS NOTES
Problem: General Medical Care Plan  Goal: *Optimal pain control at patient's stated goal  Outcome: Progressing Towards Goal  Goal: *Fluid volume balance  Outcome: Progressing Towards Goal  Goal: *Optimize nutritional status  Outcome: Progressing Towards Goal  Goal: *Anxiety reduced or absent  Outcome: Progressing Towards Goal  Goal: *Progressive mobility and function (eg: ADL's)  Outcome: Progressing Towards Goal     Problem: Falls - Risk of  Goal: *Absence of Falls  Description  Document Kushal Fall Risk and appropriate interventions in the flowsheet. Outcome: Progressing Towards Goal  Note:   Fall Risk Interventions:  Mobility Interventions: Patient to call before getting OOB         Medication Interventions: Patient to call before getting OOB, Teach patient to arise slowly    Elimination Interventions: Call light in reach, Patient to call for help with toileting needs, Stay With Me (per policy), Toileting schedule/hourly rounds, Urinal in reach              Problem: Pressure Injury - Risk of  Goal: *Prevention of pressure injury  Description  Document Mj Scale and appropriate interventions in the flowsheet.   Outcome: Progressing Towards Goal  Note:   Pressure Injury Interventions:       Moisture Interventions: Absorbent underpads, Limit adult briefs, Minimize layers    Activity Interventions: Increase time out of bed, Pressure redistribution bed/mattress(bed type)    Mobility Interventions: HOB 30 degrees or less, Pressure redistribution bed/mattress (bed type)    Nutrition Interventions: Document food/fluid/supplement intake, Offer support with meals,snacks and hydration

## 2019-11-16 NOTE — ROUTINE PROCESS
Bedside and Verbal shift change report given to Melania (oncoming nurse) by Magalie Gongora (offgoing nurse). Report included the following information SBAR, Kardex, Intake/Output, MAR, Recent Results and Cardiac Rhythm NSR/NST.

## 2019-11-16 NOTE — PROGRESS NOTES
Progress Note    Patient: Nicholas Low III MRN: [id number]  SSN: [id number]    YOB: 1960  Age: 62 y.o. Sex: male      Admit Date: 2019    Diverticulitis with liver abscess    Subjective:     No acute surgical issues. Pt is doing better overall. Tolerating diet without nausea or vomiting. He denied any abdominal pain. He does report exertional chest pain when he moves. WBC trending down    Objective:     Visit Vitals  /89   Pulse 99   Temp 98.9 °F (37.2 °C)   Resp 16   Ht 5' 11\" (1.803 m)   Wt 238 lb 15.7 oz (108.4 kg)   SpO2 97%   BMI 33.33 kg/m²       Temp (24hrs), Av.6 °F (37 °C), Min:97.8 °F (36.6 °C), Max:99.2 °F (37.3 °C)        Physical Exam:    Gen:  NAD  Pulm:  Unlabored  Abd:  S/ND/mild TTP in epigastric area      Recent Results (from the past 24 hour(s))   LIPID PANEL    Collection Time: 19  3:12 AM   Result Value Ref Range    LIPID PROFILE          Cholesterol, total 83 <200 MG/DL    Triglyceride 149 <150 MG/DL    HDL Cholesterol 9 MG/DL    LDL, calculated 44.2 0 - 100 MG/DL    VLDL, calculated 29.8 MG/DL    CHOL/HDL Ratio 9.2 (H) 0.0 - 5.0     CBC WITH AUTOMATED DIFF    Collection Time: 19  3:12 AM   Result Value Ref Range    WBC 14.8 (H) 4.1 - 11.1 K/uL    RBC 4.43 4.10 - 5.70 M/uL    HGB 13.3 12.1 - 17.0 g/dL    HCT 39.5 36.6 - 50.3 %    MCV 89.2 80.0 - 99.0 FL    MCH 30.0 26.0 - 34.0 PG    MCHC 33.7 30.0 - 36.5 g/dL    RDW 13.2 11.5 - 14.5 %    PLATELET 830 404 - 521 K/uL    MPV 10.8 8.9 - 12.9 FL    NRBC 0.0 0  WBC    ABSOLUTE NRBC 0.00 0.00 - 0.01 K/uL    NEUTROPHILS 87 (H) 32 - 75 %    LYMPHOCYTES 5 (L) 12 - 49 %    MONOCYTES 5 5 - 13 %    EOSINOPHILS 1 0 - 7 %    BASOPHILS 0 0 - 1 %    IMMATURE GRANULOCYTES 2 (H) 0.0 - 0.5 %    ABS. NEUTROPHILS 12.8 (H) 1.8 - 8.0 K/UL    ABS. LYMPHOCYTES 0.8 0.8 - 3.5 K/UL    ABS. MONOCYTES 0.8 0.0 - 1.0 K/UL    ABS. EOSINOPHILS 0.1 0.0 - 0.4 K/UL    ABS. BASOPHILS 0.0 0.0 - 0.1 K/UL    ABS. IMM. GRANS. 0.3 (H) 0.00 - 0.04 K/UL    DF AUTOMATED     METABOLIC PANEL, BASIC    Collection Time: 11/16/19  3:12 AM   Result Value Ref Range    Sodium 132 (L) 136 - 145 mmol/L    Potassium 3.5 3.5 - 5.1 mmol/L    Chloride 99 97 - 108 mmol/L    CO2 26 21 - 32 mmol/L    Anion gap 7 5 - 15 mmol/L    Glucose 120 (H) 65 - 100 mg/dL    BUN 9 6 - 20 MG/DL    Creatinine 0.72 0.70 - 1.30 MG/DL    BUN/Creatinine ratio 13 12 - 20      GFR est AA >60 >60 ml/min/1.73m2    GFR est non-AA >60 >60 ml/min/1.73m2    Calcium 7.6 (L) 8.5 - 10.1 MG/DL       Assessment:     Hospital Problems  Never Reviewed          Codes Class Noted POA    Hyponatremia ICD-10-CM: E87.1  ICD-9-CM: 276.1  11/12/2019 Unknown              Plan/Recommendations/Medical Decision Making:     - Continue low fiber diet  - Continue antibiotic therapy  - Repeat CT scan monday

## 2019-11-16 NOTE — ROUTINE PROCESS
1100:  Jacey Nieto asked 5E nurse to call for report when he/she is available. Pt to go to [facility]. Pt aware of transfer. 1141:  Calling report to [address] [place] [address]. TRANSFER - OUT REPORT: 
 
Verbal report given to Lina/Kelsey (name) on Romina Wharton III  being transferred to [facility] (unit) for routine progression of care Report consisted of patients Situation, Background, Assessment and  
Recommendations(SBAR). Information from the following report(s) SBAR, Kardex, ED Summary, Intake/Output, MAR and Recent Results, procedure summary was reviewed with the receiving nurse. Lines:  
Peripheral IV 11/14/19 Posterior;Right Hand (Active) Site Assessment Clean, dry, & intact 11/16/2019  8:05 AM  
Phlebitis Assessment 0 11/16/2019  8:05 AM  
Infiltration Assessment 0 11/16/2019  8:05 AM  
Dressing Status Clean, dry, & intact 11/16/2019  8:05 AM  
Dressing Type Transparent 11/16/2019  8:05 AM  
Hub Color/Line Status Blue; Infusing 11/16/2019  8:05 AM  
Action Taken Open ports on tubing capped 11/15/2019  7:45 PM  
Alcohol Cap Used Yes 11/16/2019  8:05 AM  
  
 
Opportunity for questions and clarification was provided. Patient transported with: 
 [employer]

## 2019-11-16 NOTE — PROGRESS NOTES
Hospitalist Progress Note  Pallavi Elizondo MD  Answering service: [phone] from in house phone        Date of Service:  2019  NAME:  Romayne Pickett III  :  1960  MRN:  [id number]  PCP: Aubrie Richardson MD    Chief Complaint:   Chief Complaint   Patient presents with    Diarrhea    Cough         Admission Summary:     Deanna Bowers is a 62 y.o. male who presented with diarrhea, abdominal pain    Interval history / Subjective:   Patient seen for Follow up of CC; sepsis  Patient seen and examined by the bedside, Labs, images and notes reviewed  Tolerating diet, afebrile, comfortable, Denies any chest pain, abdominal pain, fevers, chills. No N/V/D. Transfer to medical floor. Discussed with nursing staff, no acute issues overnight, orders reviewed. Assessment & Plan:     - Sepsis, sec to severe colonic diverticulitis with microperforation and liver abscess   status post liver biopsy 2019 by IR- resolving  Blood cultures x2no growth so far  Liver biopsy and cultures:  anaerobic GNR, BL neg, final identification pending  Significantly elevated procalcitonin level at 47, repeat level is much improved to 16.4. Continue IV Zosyn, ID recommendations noted. Patient aware that he will require longer (4-6wks) of IV abx and until radiological resolution has been achieved. General surgery consult appreciated, no acute surgical indication. Diet increased to regular with low fiber diet. General surgery plans to repeat CT scan of the abdomen pelvis with contrast probably next week. - Lactic acidosis sec to sepsis  Resolved    Acute respiratory insufficiency, volume overload   Resolved, off supplemental oxygen.      - Hyponatremia: sec to sepsis and dehydration  improving  Repeat BMP in am daily    -Recurrent hypokalemia: resolved, replete prn    - elevated troponin  Likely due to sepsis/tachycardia/demand supply mismatch  Cards consult noted, no ACS as per cardiology. Echocardiogram noted, normal LVEF, cardio no follow-up with outpatient. No active CP, hx reported hx of CAD    -Morbid obesity: Weight loss recommended    Code status: Full Code    DVT prophylaxis: SCDs    Care Plan discussed with: Patient/Family, Nurse and Consultant Gen Surg    Disposition: TBD    Hospital Problems  Never Reviewed          Codes Class Noted POA    Hyponatremia ICD-10-CM: E87.1  ICD-9-CM: 276.1  2019 Unknown                Review of Systems:   A comprehensive review of systems was negative. Physical Examination:     General appearance: Alert, awake, appears older than his stated age, comfortable  Head: Normocephalic, without obvious abnormality, atraumatic  Eyes: conjunctivae/corneas clear. PERRL. Lungs: clear to auscultation bilaterally, no rales, wheezing or rhonchi  Heart: sinus tachycardia, no murmurs, no gallops  Abdomen: Obese, soft, mild tenderness in mid epigastrium at the site of liver biopsy bowel sounds positive  Neurologic: Alert and oriented X 3, normal strength and tone. Vital Signs:    Last 24hrs VS reviewed since prior progress note. Most recent are:    Visit Vitals  /88 (BP 1 Location: Right arm, BP Patient Position: At rest)   Pulse 97   Temp 98.4 °F (36.9 °C)   Resp 16   Ht 5' 11\" (1.803 m)   Wt 108.4 kg (238 lb 15.7 oz)   SpO2 97%   BMI 33.33 kg/m²         Intake/Output Summary (Last 24 hours) at 2019 1141  Last data filed at 2019 1054  Gross per 24 hour   Intake 1260 ml   Output 1550 ml   Net -290 ml        Tmax:  Temp (24hrs), Av.4 °F (36.9 °C), Min:97.8 °F (36.6 °C), Max:99.2 °F (37.3 °C)      Data Review:   Data reviewed by myself:  Xr Chest Pa Lat    Result Date: 2019  INDICATION:  febrile illness Exam: Chest 2 views. Comparison: 3/22/2017. Findings: Cardiomediastinal silhouette is normal. Pulmonary vasculature is slightly prominent and increased since the prior study.  Mild interstitial prominence predominantly at the lung bases with minimal left basilar atelectasis. No pneumothorax or focal consolidation. . Chronic right-sided rib fractures. Impression: 1. Mild prominence of the central pulmonary vasculature with minimal interstitial prominence at the lung bases. Jenifer Manuel developing mild interstitial edema as opposed to atypical infection     Ct Chest Wo Cont    Result Date: 11/12/2019  EXAM:  CT thorax without contrast INDICATION:  Cough. COMPARISON: Chest radiographs 15/84/9769 TECHNIQUE: Helical CT of the chest is performed without intravenous contrast. Coronal and sagittal reformatted images are obtained. CT dose reduction was achieved through use of a standardized protocol tailored for this examination and automatic exposure control for dose modulation. Adaptive statistical iterative reconstruction (ASIR) was utilized. FINDINGS: The visualized thyroid gland is unremarkable. The aorta and main pulmonary artery are normal in caliber. There is coronary artery atherosclerosis. The heart size is normal.  There is no pericardial effusion. There are no enlarged axillary, mediastinal, or hilar lymph nodes. There are minimal subpleural reticular opacities in the lower lungs. There is minimal left basilar scarring or atelectasis. There is no suspicious lung nodule, mass, or consolidation. In the limited images of the upper abdomen, the partially imaged solid organs are unremarkable. Degenerative changes in the spine. There is no aggressive bony lesion. IMPRESSION: Minimal interstitial changes in the lower lungs with minimal left basilar scarring or atelectasis. No evidence for atypical infection or other acute abnormality. Ct Abd Pelv Wo Cont    Result Date: 11/12/2019  EXAM: CT ABD PELV WO CONT INDICATION: abd pain COMPARISON: CT chest 11/12/2019 CONTRAST:  None. TECHNIQUE: Thin axial images were obtained through the abdomen and pelvis. Coronal and sagittal reconstructions were generated.  Oral contrast was not administered. CT dose reduction was achieved through use of a standardized protocol tailored for this examination and automatic exposure control for dose modulation. The absence of intravenous contrast material reduces the sensitivity for evaluation of the solid parenchymal organs of the abdomen. FINDINGS: LUNG BASES: There is mild bibasilar atelectasis. INCIDENTALLY IMAGED HEART AND MEDIASTINUM: Unremarkable. LIVER: Diffuse fatty infiltration of the liver. GALLBLADDER: Unremarkable. SPLEEN: No mass. PANCREAS: No mass or ductal dilatation. ADRENALS: Unremarkable. KIDNEYS/URETERS: No mass, calculus, or hydronephrosis. STOMACH: Unremarkable. SMALL BOWEL: No bowel obstruction or perforation COLON: Extensive diverticulosis of the colon. There is bowel wall thickening of the sigmoid colon with extensive diverticula as well as pericolonic inflammation, but no focal fluid collections. There is also a small amount of gas adjacent to the affected loop of sigmoid colon, which may be extraluminal. There is no free intraperitoneal air. APPENDIX: Unremarkable. PERITONEUM: No ascites RETROPERITONEUM: No lymphadenopathy or aortic aneurysm. REPRODUCTIVE ORGANS: The prostate is noted. URINARY BLADDER: No mass or calculus. BONES: No destructive bone lesion. ADDITIONAL COMMENTS: There is degenerative disc disease which is most advanced at L4-L5. IMPRESSION: 1. Concentric bowel wall thickening of a 7 cm segment of sigmoid colon, where there are extensive diverticula and mild pericolonic inflammation. This is consistent with colitis, which may be due to diverticulitis. Notably, there is a small amount of extraluminal gas suspected, immediately adjacent to the affected segment of sigmoid colon. This may represent a localized perforation. 2. No evidence of abscess or bowel obstruction. 3. Diffuse fatty infiltration of the liver.     [address] [facility]    Result Date: 11/12/2019  INDICATION:  Elevated LFTs COMPARISON:  CT scan earlier today FINDINGS: Limited right upper quadrant ultrasound was performed. The liver is diffusely increased in echogenicity and enlarged. Within the left lobe of the liver, there is an ill-defined, mixed echogenicity mass, measuring 5.6 x 5.5 x 3.6 cm. . The common bile duct is normal measuring 3 mm in diameter. The gallbladder is normal. The right kidney measures 11.8 cm in length. IMPRESSION: 1. Enlarged, echogenic liver, compatible with diffuse fatty infiltration. 2. Complex mass in the left lobe of the liver measuring 5.6 cm. 3. No biliary ductal dilatation. Further evaluation with liver mass protocol MRI or CT is recommended. Xr Chest Port    Result Date: 11/13/2019  PORTABLE CHEST RADIOGRAPH/S: 11/13/2019 4:53 AM Clinical history: Wheezing and dyspnea INDICATION:   wheeze, sob COMPARISON: 11/12/2019 FINDINGS: AP portable upright view of the chest demonstrates a stable  cardiopericardial silhouette. The lungs are adequately expanded. Minimal interstitial edema. No pleural effusion or pneumothorax. The osseous structures are unremarkable. Patient is on a cardiac monitor. IMPRESSION: Minimal interstitial edema. No significant change. .     No results found for: SDES  Lab Results   Component Value Date/Time    Culture result: NO GROWTH 2 DAYS, AEROBICALLY 11/13/2019 04:15 PM    Culture result: (A) 11/13/2019 04:15 PM     MODERATE ANAEROBIC GRAM NEGATIVE RODS ISOLATED . .. PLEASE REFER TO Kevin Vivas [id number] FOR IDENTIFICATION    Culture result: (A) 11/13/2019 04:00 PM     MODERATE ANAEROBIC GRAM NEGATIVE RODS , BETA LACTAMASE NEGATIVE    Culture result: IDENTIFICATION TO FOLLOW 11/13/2019 04:00 PM     All Micro Results     Procedure Component Value Units Date/Time    CULTURE, BLOOD, PAIRED [[id number]] Collected:  11/12/19 0803    Order Status:  Completed Specimen:  Blood Updated:  11/16/19 0636     Special Requests: NO SPECIAL REQUESTS        Culture result: NO GROWTH 3 DAYS       CULTURE, WOUND Jemal Pennant STAIN [[id number]] (Abnormal) Collected:  11/13/19 1530    Order Status:  Completed Specimen:  Abscess Updated:  11/15/19 1416     Special Requests: LIVER        GRAM STAIN 3+ WBCS SEEN         NO ORGANISMS SEEN        Culture result: NO GROWTH IN 2 DAYS, AEROBICALLY            MODERATE ANAEROBIC GRAM NEGATIVE RODS ISOLATED . ... PLEASE REFER TO [facility] [id number], FOR IDENTIFICATION          CULTURE, Kathee Pa STAIN [[id number]]  (Abnormal) Collected:  11/13/19 1615    Order Status:  Completed Specimen:  Abscess Updated:  11/15/19 1414     Special Requests: LIVER        GRAM STAIN 2+ WBCS SEEN         NO ORGANISMS SEEN        Culture result: NO GROWTH 2 DAYS, AEROBICALLY            MODERATE ANAEROBIC GRAM NEGATIVE RODS ISOLATED . .. PLEASE REFER TO [facility] [id number] FOR IDENTIFICATION          CULTURE, ANAEROBIC [[id number]]  (Abnormal) Collected:  11/13/19 1600    Order Status:  Completed Specimen:  Abscess Updated:  11/15/19 1410     Special Requests: LIVER     Culture result:       MODERATE ANAEROBIC GRAM NEGATIVE RODS , BETA LACTAMASE NEGATIVE            IDENTIFICATION TO FOLLOW       CULTURE, TISSUE W Claude Keepers [[id number]] Collected:  11/13/19 1600    Order Status:  Canceled Specimen:  Liver     CULTURE, BODY FLUID W Claude Keepers [[id number]] Collected:  11/13/19 1600    Order Status:  Canceled Specimen:  Miscellaneous Fluid     LEGIONELLA PNEUMOPHILA AG, URINE [[id number]] Collected:  11/12/19 1540    Order Status:  Completed Specimen:  Urine Updated:  11/13/19 1536     Source URINE        L pneumophila S1 Ag, urine NEGATIVE         Comment: (NOTE)  Presumptive negative for L. pneumophila serogroup 1 antigen in urine,  suggesting no recent or current infection. Legionnaires' disease  cannot be ruled out since other serogroups and species may also  cause disease.   Performed At: [address] [id number]  Keo Moore MD QP:[id number]         C. DIFFICILE AG & TOXIN A/B [[id number]] Collected:  11/12/19 1715    Order Status:  Canceled Specimen:  Stool     ENTERIC BACT. Boston Chiang [[id number]] Collected:  11/12/19 1715    Order Status:  Canceled     URINE CULTURE HOLD SAMPLE [[id number]] Collected:  11/12/19 1540    Order Status:  Completed Specimen:  Urine from Serum Updated:  11/12/19 1551     Urine culture hold       URINE ON HOLD IN MICROBIOLOGY DEPT FOR 3 DAYS. IF UNPRESERVED URINE IS SUBMITTED, IT CANNOT BE USED FOR ADDITIONAL TESTING AFTER 24 HRS, RECOLLECTION WILL BE REQUIRED. INFLUENZA A & B AG (RAPID TEST) [[id number]] Collected:  11/12/19 1312    Order Status:  Completed Specimen:  Nasopharyngeal from Nasal washing Updated:  11/12/19 1333     Influenza A Antigen NEGATIVE         Influenza B Antigen NEGATIVE              Labs: reviewed by myself. Recent Labs     11/16/19  0312 11/15/19  0508   WBC 14.8* 18.2*   HGB 13.3 12.7   HCT 39.5 37.7    155     Recent Labs     11/16/19  0312 11/15/19  0508 11/14/19  0443   * 131* 133*   K 3.5 3.0* 2.9*   CL 99 97 100   CO2 26 24 26   BUN 9 11 14   CREA 0.72 0.68* 0.79   * 110* 110*   CA 7.6* 7.4* 7.6*   MG  --   --  2.1   PHOS  --   --  3.2     Recent Labs     11/14/19  0443   SGOT 100*   *   *   TBILI 0.9   TP 5.1*   ALB 2.0*   GLOB 3.1     No results for input(s): INR, PTP, APTT, INREXT, INREXT in the last 72 hours. No results for input(s): FE, TIBC, PSAT, FERR in the last 72 hours. No results found for: FOL, RBCF   No results for input(s): PH, PCO2, PO2 in the last 72 hours. No results for input(s): CPK, CKNDX, TROIQ in the last 72 hours.     No lab exists for component: CPKMB  Lab Results   Component Value Date/Time    Cholesterol, total 83 11/16/2019 03:12 AM    HDL Cholesterol 9 11/16/2019 03:12 AM    LDL, calculated 44.2 11/16/2019 03:12 AM    Triglyceride 149 11/16/2019 03:12 AM    CHOL/HDL Ratio 9.2 (H) 11/16/2019 03:12 AM     No results found for: GLUCPOC  Lab Results   Component Value Date/Time    Color DARK YELLOW 11/12/2019 03:40 PM    Appearance CLOUDY (A) 11/12/2019 03:40 PM    Specific gravity 1.023 11/12/2019 03:40 PM    pH (UA) 6.5 11/12/2019 03:40 PM    Protein 100 (A) 11/12/2019 03:40 PM    Glucose 100 (A) 11/12/2019 03:40 PM    Ketone TRACE (A) 11/12/2019 03:40 PM    Urobilinogen 4.0 (H) 11/12/2019 03:40 PM    Nitrites NEGATIVE  11/12/2019 03:40 PM    Leukocyte Esterase NEGATIVE  11/12/2019 03:40 PM    Epithelial cells FEW 11/12/2019 03:40 PM    Bacteria NEGATIVE  11/12/2019 03:40 PM    WBC 0-4 11/12/2019 03:40 PM    RBC 0-5 11/12/2019 03:40 PM         Medications Reviewed:     Current Facility-Administered Medications   Medication Dose Route Frequency    oxyCODONE-acetaminophen (PERCOCET) 5-325 mg per tablet 1 Tab  1 Tab Oral Q4H PRN    lactobac ac& pc-s.therm-b.anim (XIOMARA Q/RISAQUAD)  1 Cap Oral DAILY    piperacillin-tazobactam (ZOSYN) 3.375 g in 0.9% sodium chloride (MBP/ADV) 100 mL  3.375 g IntraVENous Q8H    albuterol-ipratropium (DUO-NEB) 2.5 MG-0.5 MG/3 ML  3 mL Nebulization Q4H PRN    acetaminophen (TYLENOL) tablet 650 mg  650 mg Oral Q6H PRN    ondansetron (ZOFRAN) injection 4 mg  4 mg IntraVENous Q8H PRN    sodium chloride (NS) flush 5-40 mL  5-40 mL IntraVENous Q8H    sodium chloride (NS) flush 5-40 mL  5-40 mL IntraVENous PRN     ______________________________________________________________________  EXPECTED LENGTH OF STAY: 4d 19h  ACTUAL LENGTH OF STAY:          4                 Noe Mulligan MD     Patient's emergency contacts:  Extended Emergency Contact Information  Primary Emergency Contact: None, Given  Home Phone: [phone]  Relation: Other Non-relative  Secondary Emergency Contact: Henry Campbell  Mobile Phone: [phone]  Relation: Brother

## 2019-11-17 PROBLEM — K57.32 DIVERTICULITIS OF LARGE INTESTINE WITHOUT BLEEDING: Status: ACTIVE | Noted: 2019-11-17

## 2019-11-17 PROBLEM — A41.50 GRAM-NEG SEPTICEMIA (HCC): Status: ACTIVE | Noted: 2019-11-17

## 2019-11-17 PROBLEM — A41.9 SEPSIS (HCC): Status: ACTIVE | Noted: 2019-11-17

## 2019-11-17 PROBLEM — K75.0 LIVER ABSCESS: Status: ACTIVE | Noted: 2019-11-17

## 2019-11-17 LAB
ANION GAP SERPL CALC-SCNC: 3 MMOL/L (ref 5–15)
BASOPHILS # BLD: 0 K/UL (ref 0–0.1)
BASOPHILS NFR BLD: 0 % (ref 0–1)
BUN SERPL-MCNC: 9 MG/DL (ref 6–20)
BUN/CREAT SERPL: 10 (ref 12–20)
CALCIUM SERPL-MCNC: 7.7 MG/DL (ref 8.5–10.1)
CHLORIDE SERPL-SCNC: 100 MMOL/L (ref 97–108)
CO2 SERPL-SCNC: 30 MMOL/L (ref 21–32)
CREAT SERPL-MCNC: 0.89 MG/DL (ref 0.7–1.3)
DIFFERENTIAL METHOD BLD: ABNORMAL
EOSINOPHIL # BLD: 0 K/UL (ref 0–0.4)
EOSINOPHIL NFR BLD: 0 % (ref 0–7)
ERYTHROCYTE [DISTWIDTH] IN BLOOD BY AUTOMATED COUNT: 13.4 % (ref 11.5–14.5)
GLUCOSE SERPL-MCNC: 128 MG/DL (ref 65–100)
HCT VFR BLD AUTO: 40.7 % (ref 36.6–50.3)
HGB BLD-MCNC: 13.2 G/DL (ref 12.1–17)
IMM GRANULOCYTES # BLD AUTO: 0 K/UL
IMM GRANULOCYTES NFR BLD AUTO: 0 %
LACTATE SERPL-SCNC: 2.3 MMOL/L (ref 0.4–2)
LYMPHOCYTES # BLD: 1.5 K/UL (ref 0.8–3.5)
LYMPHOCYTES NFR BLD: 11 % (ref 12–49)
MCH RBC QN AUTO: 29.8 PG (ref 26–34)
MCHC RBC AUTO-ENTMCNC: 32.4 G/DL (ref 30–36.5)
MCV RBC AUTO: 91.9 FL (ref 80–99)
MONOCYTES # BLD: 1 K/UL (ref 0–1)
MONOCYTES NFR BLD: 7 % (ref 5–13)
NEUTS BAND NFR BLD MANUAL: 2 % (ref 0–6)
NEUTS SEG # BLD: 11.4 K/UL (ref 1.8–8)
NEUTS SEG NFR BLD: 80 % (ref 32–75)
NRBC # BLD: 0 K/UL (ref 0–0.01)
NRBC BLD-RTO: 0 PER 100 WBC
PLATELET # BLD AUTO: 212 K/UL (ref 150–400)
PLATELET COMMENTS,PCOM: ABNORMAL
PMV BLD AUTO: 10.3 FL (ref 8.9–12.9)
POTASSIUM SERPL-SCNC: 3.5 MMOL/L (ref 3.5–5.1)
RBC # BLD AUTO: 4.43 M/UL (ref 4.1–5.7)
RBC MORPH BLD: ABNORMAL
SODIUM SERPL-SCNC: 133 MMOL/L (ref 136–145)
WBC # BLD AUTO: 13.9 K/UL (ref 4.1–11.1)

## 2019-11-17 PROCEDURE — 87040 BLOOD CULTURE FOR BACTERIA: CPT

## 2019-11-17 PROCEDURE — 85025 COMPLETE CBC W/AUTO DIFF WBC: CPT

## 2019-11-17 PROCEDURE — 65270000032 HC RM SEMIPRIVATE

## 2019-11-17 PROCEDURE — 36415 COLL VENOUS BLD VENIPUNCTURE: CPT

## 2019-11-17 PROCEDURE — 74011250636 HC RX REV CODE- 250/636: Performed by: SURGERY

## 2019-11-17 PROCEDURE — 74011000258 HC RX REV CODE- 258: Performed by: SURGERY

## 2019-11-17 PROCEDURE — 80048 BASIC METABOLIC PNL TOTAL CA: CPT

## 2019-11-17 PROCEDURE — 74011250637 HC RX REV CODE- 250/637: Performed by: INTERNAL MEDICINE

## 2019-11-17 PROCEDURE — 83605 ASSAY OF LACTIC ACID: CPT

## 2019-11-17 RX ADMIN — Medication 1 CAPSULE: at 10:40

## 2019-11-17 RX ADMIN — OXYCODONE AND ACETAMINOPHEN 1 TABLET: 5; 325 TABLET ORAL at 12:34

## 2019-11-17 RX ADMIN — PIPERACILLIN SODIUM AND TAZOBACTAM SODIUM 3.38 G: 3; .375 INJECTION, POWDER, LYOPHILIZED, FOR SOLUTION INTRAVENOUS at 17:23

## 2019-11-17 RX ADMIN — PIPERACILLIN SODIUM AND TAZOBACTAM SODIUM 3.38 G: 3; .375 INJECTION, POWDER, LYOPHILIZED, FOR SOLUTION INTRAVENOUS at 10:41

## 2019-11-17 RX ADMIN — OXYCODONE AND ACETAMINOPHEN 1 TABLET: 5; 325 TABLET ORAL at 04:23

## 2019-11-17 RX ADMIN — PIPERACILLIN SODIUM AND TAZOBACTAM SODIUM 3.38 G: 3; .375 INJECTION, POWDER, LYOPHILIZED, FOR SOLUTION INTRAVENOUS at 02:36

## 2019-11-17 RX ADMIN — OXYCODONE AND ACETAMINOPHEN 1 TABLET: 5; 325 TABLET ORAL at 18:17

## 2019-11-17 NOTE — PROGRESS NOTES
Progress Note    Patient: Daryn Dye III MRN: [id number]  SSN: [id number]    YOB: 1960  Age: 62 y.o. Sex: male      Admit Date: 2019    Diverticulitis with liver abscess    Subjective:     No acute surgical issues. Pt is doing better overall. Tolerating diet without nausea or vomiting. He denied any abdominal pain. WBC trending down but pt has bacteremia    Objective:     Visit Vitals  /73   Pulse 87   Temp 99 °F (37.2 °C)   Resp 18   Ht 5' 11\" (1.803 m)   Wt 237 lb 9.6 oz (107.8 kg)   SpO2 98%   BMI 33.14 kg/m²       Temp (24hrs), Av.1 °F (36.7 °C), Min:97.6 °F (36.4 °C), Max:99 °F (37.2 °C)        Physical Exam:    Gen:  NAD  Pulm:  Unlabored  Abd:  S/ND/mild TTP in epigastric area      Recent Results (from the past 24 hour(s))   CBC WITH AUTOMATED DIFF    Collection Time: 19  2:32 AM   Result Value Ref Range    WBC 13.9 (H) 4.1 - 11.1 K/uL    RBC 4.43 4.10 - 5.70 M/uL    HGB 13.2 12.1 - 17.0 g/dL    HCT 40.7 36.6 - 50.3 %    MCV 91.9 80.0 - 99.0 FL    MCH 29.8 26.0 - 34.0 PG    MCHC 32.4 30.0 - 36.5 g/dL    RDW 13.4 11.5 - 14.5 %    PLATELET 756 199 - 792 K/uL    MPV 10.3 8.9 - 12.9 FL    NRBC 0.0 0  WBC    ABSOLUTE NRBC 0.00 0.00 - 0.01 K/uL    NEUTROPHILS 80 (H) 32 - 75 %    BAND NEUTROPHILS 2 0 - 6 %    LYMPHOCYTES 11 (L) 12 - 49 %    MONOCYTES 7 5 - 13 %    EOSINOPHILS 0 0 - 7 %    BASOPHILS 0 0 - 1 %    IMMATURE GRANULOCYTES 0 %    ABS. NEUTROPHILS 11.4 (H) 1.8 - 8.0 K/UL    ABS. LYMPHOCYTES 1.5 0.8 - 3.5 K/UL    ABS. MONOCYTES 1.0 0.0 - 1.0 K/UL    ABS. EOSINOPHILS 0.0 0.0 - 0.4 K/UL    ABS. BASOPHILS 0.0 0.0 - 0.1 K/UL    ABS. IMM.  GRANS. 0.0 K/UL    DF MANUAL      PLATELET COMMENTS Large Platelets      RBC COMMENTS NORMOCYTIC, NORMOCHROMIC     METABOLIC PANEL, BASIC    Collection Time: 19  2:32 AM   Result Value Ref Range    Sodium 133 (L) 136 - 145 mmol/L    Potassium 3.5 3.5 - 5.1 mmol/L    Chloride 100 97 - 108 mmol/L    CO2 30 21 - 32 mmol/L    Anion gap 3 (L) 5 - 15 mmol/L    Glucose 128 (H) 65 - 100 mg/dL    BUN 9 6 - 20 MG/DL    Creatinine 0.89 0.70 - 1.30 MG/DL    BUN/Creatinine ratio 10 (L) 12 - 20      GFR est AA >60 >60 ml/min/1.73m2    GFR est non-AA >60 >60 ml/min/1.73m2    Calcium 7.7 (L) 8.5 - 10.1 MG/DL   LACTIC ACID    Collection Time: 11/17/19 10:40 AM   Result Value Ref Range    Lactic acid 2.3 (HH) 0.4 - 2.0 MMOL/L       Assessment:     Hospital Problems  Date Reviewed: 11/17/2019          Codes Class Noted POA    * (Principal) Sepsis ([facility] ICD-10-CM: A41.9  ICD-9-CM: 038.9, 995.91  11/17/2019 Yes        Liver abscess ICD-10-CM: K75.0  ICD-9-CM: 572.0  11/17/2019 Yes        Gram-neg septicemia ([facility] ICD-10-CM: A41.50  ICD-9-CM: 038.40  11/17/2019 Yes        Diverticulitis of large intestine without bleeding ICD-10-CM: K57.32  ICD-9-CM: 562.11  11/17/2019 Yes        Hyponatremia ICD-10-CM: E87.1  ICD-9-CM: 276.1  11/12/2019 Yes              Plan/Recommendations/Medical Decision Making:     - Continue low fiber diet  - Continue antibiotic therapy  - Repeat CT scan monday

## 2019-11-17 NOTE — PROGRESS NOTES
Notified Dr. Hao Jones via perfect serve for patient positive BC drawn on 11/12. No new orders received at this time.

## 2019-11-17 NOTE — PROGRESS NOTES
Hospitalist Progress Note  Lauri Machado MD  Answering service: [phone] from in house phone        Date of Service:  2019  NAME:  Eduardo Baker III  :  1960  MRN:  [id number]  PCP: Christie Aquino MD    Chief Complaint:   Chief Complaint   Patient presents with    Diarrhea    Cough         Admission Summary:     Jayson Markham is a 62 y.o. male who presented with diarrhea, abdominal pain    Interval history / Subjective:   Patient seen for Follow up of CC; sepsis  Patient seen and examined by the bedside, Labs, images and notes reviewed  comfortable, Denies any chest pain, abdominal pain, fevers, chills. No N/V/D. tolerating diet. Discussed with nursing staff, no acute issues overnight, orders reviewed. Assessment & Plan:     - Sepsis, sec to severe colonic diverticulitis with microperforation and liver abscess   status post liver biopsy 2019 by IR- resolving  Blood cultures x2/4  anaerobic GNR  Repeat BC x 2 : ordered  Liver biopsy and cultures:  anaerobic GNR, BL neg, final identification pending. Called the lab and they expect final speciation later today. Significantly elevated procalcitonin level at 47, repeat level is much improved to 16.4. Continue IV Zosyn, ID recommendations noted. Patient aware that he will require longer (4-6wks) of IV abx and until radiological resolution has been achieved. TTE neg for vegetations, may need LATANYA given positive BC. General surgery consult appreciated, no acute surgical indication. Diet increased to regular with low fiber diet. General surgery plans to repeat CT scan of the abdomen pelvis with contrast on monday    - Lactic acidosis sec to sepsis  Resolved    Acute respiratory insufficiency, volume overload   Resolved, off supplemental oxygen.      - Hyponatremia: sec to sepsis and dehydration  improving  Repeat BMP in am daily    -Recurrent hypokalemia: resolved, replete prn    - elevated troponin  Likely due to sepsis/tachycardia/demand supply mismatch  Cards consult noted, no ACS as per cardiology. Echocardiogram noted, normal LVEF, cardio no follow-up with outpatient. No active CP, hx reported hx of CAD    -Morbid obesity: Weight loss recommended    Code status: Full Code    DVT prophylaxis: SCDs    Care Plan discussed with: Patient/Family, Nurse and Consultant Gen Surg    Disposition: TBD    Hospital Problems  Never Reviewed          Codes Class Noted POA    Hyponatremia ICD-10-CM: E87.1  ICD-9-CM: 276.1  2019 Unknown                Review of Systems:   A comprehensive review of systems was negative. Physical Examination:     General appearance: Alert, awake, appears older than his stated age, comfortable  Head: Normocephalic, without obvious abnormality, atraumatic  Eyes: conjunctivae/corneas clear. PERRL. Lungs: clear to auscultation bilaterally, no rales, wheezing or rhonchi  Heart: sinus tachycardia, no murmurs, no gallops  Abdomen: Obese, soft, mild tenderness in mid epigastrium at the site of liver biopsy bowel sounds positive  Neurologic: Alert and oriented X 3, normal strength and tone. Vital Signs:    Last 24hrs VS reviewed since prior progress note. Most recent are:    Visit Vitals  /77   Pulse 82   Temp 97.6 °F (36.4 °C)   Resp 17   Ht 5' 11\" (1.803 m)   Wt 107.8 kg (237 lb 9.6 oz)   SpO2 95%   BMI 33.14 kg/m²         Intake/Output Summary (Last 24 hours) at 2019 0933  Last data filed at 2019 1759  Gross per 24 hour   Intake    Output 850 ml   Net -850 ml        Tmax:  Temp (24hrs), Av.1 °F (36.7 °C), Min:97.6 °F (36.4 °C), Max:98.9 °F (37.2 °C)      Data Review:   Data reviewed by myself:  Xr Chest Pa Lat    Result Date: 2019  INDICATION:  febrile illness Exam: Chest 2 views. Comparison: 3/22/2017. Findings: Cardiomediastinal silhouette is normal. Pulmonary vasculature is slightly prominent and increased since the prior study.  Mild interstitial prominence predominantly at the lung bases with minimal left basilar atelectasis. No pneumothorax or focal consolidation. . Chronic right-sided rib fractures. Impression: 1. Mild prominence of the central pulmonary vasculature with minimal interstitial prominence at the lung bases. [place] developing mild interstitial edema as opposed to atypical infection     Ct Chest Wo Cont    Result Date: 11/12/2019  EXAM:  CT thorax without contrast INDICATION:  Cough. COMPARISON: Chest radiographs 13/00/5033 TECHNIQUE: Helical CT of the chest is performed without intravenous contrast. Coronal and sagittal reformatted images are obtained. CT dose reduction was achieved through use of a standardized protocol tailored for this examination and automatic exposure control for dose modulation. Adaptive statistical iterative reconstruction (ASIR) was utilized. FINDINGS: The visualized thyroid gland is unremarkable. The aorta and main pulmonary artery are normal in caliber. There is coronary artery atherosclerosis. The heart size is normal.  There is no pericardial effusion. There are no enlarged axillary, mediastinal, or hilar lymph nodes. There are minimal subpleural reticular opacities in the lower lungs. There is minimal left basilar scarring or atelectasis. There is no suspicious lung nodule, mass, or consolidation. In the limited images of the upper abdomen, the partially imaged solid organs are unremarkable. Degenerative changes in the spine. There is no aggressive bony lesion. IMPRESSION: Minimal interstitial changes in the lower lungs with minimal left basilar scarring or atelectasis. No evidence for atypical infection or other acute abnormality. Ct Abd Pelv Wo Cont    Result Date: 11/12/2019  EXAM: CT ABD PELV WO CONT INDICATION: abd pain COMPARISON: CT chest 11/12/2019 CONTRAST:  None. TECHNIQUE: Thin axial images were obtained through the abdomen and pelvis.  Coronal and sagittal reconstructions were generated. Oral contrast was not administered. CT dose reduction was achieved through use of a standardized protocol tailored for this examination and automatic exposure control for dose modulation. The absence of intravenous contrast material reduces the sensitivity for evaluation of the solid parenchymal organs of the abdomen. FINDINGS: LUNG BASES: There is mild bibasilar atelectasis. INCIDENTALLY IMAGED HEART AND MEDIASTINUM: Unremarkable. LIVER: Diffuse fatty infiltration of the liver. GALLBLADDER: Unremarkable. SPLEEN: No mass. PANCREAS: No mass or ductal dilatation. ADRENALS: Unremarkable. KIDNEYS/URETERS: No mass, calculus, or hydronephrosis. STOMACH: Unremarkable. SMALL BOWEL: No bowel obstruction or perforation COLON: Extensive diverticulosis of the colon. There is bowel wall thickening of the sigmoid colon with extensive diverticula as well as pericolonic inflammation, but no focal fluid collections. There is also a small amount of gas adjacent to the affected loop of sigmoid colon, which may be extraluminal. There is no free intraperitoneal air. APPENDIX: Unremarkable. PERITONEUM: No ascites RETROPERITONEUM: No lymphadenopathy or aortic aneurysm. REPRODUCTIVE ORGANS: The prostate is noted. URINARY BLADDER: No mass or calculus. BONES: No destructive bone lesion. ADDITIONAL COMMENTS: There is degenerative disc disease which is most advanced at L4-L5. IMPRESSION: 1. Concentric bowel wall thickening of a 7 cm segment of sigmoid colon, where there are extensive diverticula and mild pericolonic inflammation. This is consistent with colitis, which may be due to diverticulitis. Notably, there is a small amount of extraluminal gas suspected, immediately adjacent to the affected segment of sigmoid colon. This may represent a localized perforation. 2. No evidence of abscess or bowel obstruction. 3. Diffuse fatty infiltration of the liver.     [facility]    Result Date: 11/12/2019  INDICATION:  Elevated LFTs COMPARISON:  CT scan earlier today FINDINGS: Limited right upper quadrant ultrasound was performed. The liver is diffusely increased in echogenicity and enlarged. Within the left lobe of the liver, there is an ill-defined, mixed echogenicity mass, measuring 5.6 x 5.5 x 3.6 cm. . The common bile duct is normal measuring 3 mm in diameter. The gallbladder is normal. The right kidney measures 11.8 cm in length. IMPRESSION: 1. Enlarged, echogenic liver, compatible with diffuse fatty infiltration. 2. Complex mass in the left lobe of the liver measuring 5.6 cm. 3. No biliary ductal dilatation. Further evaluation with liver mass protocol MRI or CT is recommended. Xr Chest Port    Result Date: 11/13/2019  PORTABLE CHEST RADIOGRAPH/S: 11/13/2019 4:53 AM Clinical history: Wheezing and dyspnea INDICATION:   wheeze, sob COMPARISON: 11/12/2019 FINDINGS: AP portable upright view of the chest demonstrates a stable  cardiopericardial silhouette. The lungs are adequately expanded. Minimal interstitial edema. No pleural effusion or pneumothorax. The osseous structures are unremarkable. Patient is on a cardiac monitor. IMPRESSION: Minimal interstitial edema. No significant change. .     No results found for: SDES  Lab Results   Component Value Date/Time    Culture result: NO GROWTH 2 DAYS, AEROBICALLY 11/13/2019 04:15 PM    Culture result: (A) 11/13/2019 04:15 PM     MODERATE ANAEROBIC GRAM NEGATIVE RODS ISOLATED . .. PLEASE REFER TO Lynnda Opitz [id number] FOR IDENTIFICATION    Culture result: (A) 11/13/2019 04:00 PM     MODERATE ANAEROBIC GRAM NEGATIVE RODS , BETA LACTAMASE NEGATIVE    Culture result: IDENTIFICATION TO FOLLOW 11/13/2019 04:00 PM     All Micro Results     Procedure Component Value Units Date/Time    CULTURE, BLOOD, PAIRED [[id number]]     Order Status:  Sent Specimen:  Blood     CULTURE, BLOOD, PAIRED [[id number]]  (Abnormal) Collected:  11/12/19 0803    Order Status:  Completed Specimen:  Blood Updated:  11/17/19 1279 Special Requests: NO SPECIAL REQUESTS        Culture result:       ANAEROBIC GRAM NEGATIVE RODS GROWING IN 2 OF 4 BOTTLES DRAWN (SITES = L AC AND R AC)                  PRELIMINARY REPORT OF ANAEROBIC GRAM NEGATIVE RODS GROWING IN 2 OF 4 BOTTLES DRAWN CALLED TO AND READ BACK BY Perry Alcantara RN ON 11/17/19 AT 0647 TA.                  REMAINING BOTTLE(S) HAS/HAVE NO GROWTH SO FAR          CULTURE, Hershal Kaufman STAIN [[id number]]  (Abnormal) Collected:  11/13/19 1530    Order Status:  Completed Specimen:  Abscess Updated:  11/15/19 1416     Special Requests: LIVER        GRAM STAIN 3+ WBCS SEEN         NO ORGANISMS SEEN        Culture result: NO GROWTH IN 2 DAYS, AEROBICALLY            MODERATE ANAEROBIC GRAM NEGATIVE RODS ISOLATED . ... PLEASE REFER TO [facility] [id number], FOR IDENTIFICATION          CULTURE, Hershal Kaufman STAIN [[id number]]  (Abnormal) Collected:  11/13/19 1615    Order Status:  Completed Specimen:  Abscess Updated:  11/15/19 1414     Special Requests: LIVER        GRAM STAIN 2+ WBCS SEEN         NO ORGANISMS SEEN        Culture result: NO GROWTH 2 DAYS, AEROBICALLY            MODERATE ANAEROBIC GRAM NEGATIVE RODS ISOLATED . .. PLEASE REFER TO [facility] [id number] FOR IDENTIFICATION          CULTURE, ANAEROBIC [[id number]]  (Abnormal) Collected:  11/13/19 1600    Order Status:  Completed Specimen:  Abscess Updated:  11/15/19 1410     Special Requests: LIVER     Culture result:       MODERATE ANAEROBIC GRAM NEGATIVE RODS , BETA LACTAMASE NEGATIVE            IDENTIFICATION TO FOLLOW       CULTURE, TISSUE W Echo Bedoya [[id number]] Collected:  11/13/19 1600    Order Status:  Canceled Specimen:  Liver     CULTURE, BODY FLUID W Echo Roblesid [[id number]] Collected:  11/13/19 1600    Order Status:  Canceled Specimen:  Miscellaneous Fluid     LEGIONELLA PNEUMOPHILA AG, URINE [[id number]] Collected:  11/12/19 1540    Order Status:  Completed Specimen:  Urine Updated:  11/13/19 1536     Source URINE        L pneumophila S1 Ag, urine NEGATIVE         Comment: (NOTE)  Presumptive negative for L. pneumophila serogroup 1 antigen in urine,  suggesting no recent or current infection. Legionnaires' disease  cannot be ruled out since other serogroups and species may also  cause disease. Performed At: [address] [id number]  Mahsa Banks MD [place]:[id number]         C. DIFFICILE AG & TOXIN A/B [[id number]] Collected:  11/12/19 1715    Order Status:  Canceled Specimen:  Stool     ENTERIC BACT. Melba Font [[id number]] Collected:  11/12/19 1715    Order Status:  Canceled     URINE CULTURE HOLD SAMPLE [[id number]] Collected:  11/12/19 1540    Order Status:  Completed Specimen:  Urine from Serum Updated:  11/12/19 1551     Urine culture hold       URINE ON HOLD IN MICROBIOLOGY DEPT FOR 3 DAYS. IF UNPRESERVED URINE IS SUBMITTED, IT CANNOT BE USED FOR ADDITIONAL TESTING AFTER 24 HRS, RECOLLECTION WILL BE REQUIRED. INFLUENZA A & B AG (RAPID TEST) [[id number]] Collected:  11/12/19 1312    Order Status:  Completed Specimen:  Nasopharyngeal from Nasal washing Updated:  11/12/19 1333     Influenza A Antigen NEGATIVE         Influenza B Antigen NEGATIVE              Labs: reviewed by myself. Recent Labs     11/17/19  0232 11/16/19  0312   WBC 13.9* 14.8*   HGB 13.2 13.3   HCT 40.7 39.5    174     Recent Labs     11/17/19  0232 11/16/19  0312 11/15/19  0508   * 132* 131*   K 3.5 3.5 3.0*    99 97   CO2 30 26 24   BUN 9 9 11   CREA 0.89 0.72 0.68*   * 120* 110*   CA 7.7* 7.6* 7.4*     No results for input(s): SGOT, GPT, ALT, AP, TBIL, TBILI, TP, ALB, GLOB, GGT, AML, LPSE in the last 72 hours. No lab exists for component: AMYP, HLPSE  No results for input(s): INR, PTP, APTT, INREXT, INREXT in the last 72 hours. No results for input(s): FE, TIBC, PSAT, FERR in the last 72 hours. No results found for: FOL, RBCF   No results for input(s): PH, PCO2, PO2 in the last 72 hours.   No results for input(s): CPK, CKNDX, TROIQ in the last 72 hours.     No lab exists for component: CPKMB  Lab Results   Component Value Date/Time    Cholesterol, total 83 11/16/2019 03:12 AM    HDL Cholesterol 9 11/16/2019 03:12 AM    LDL, calculated 44.2 11/16/2019 03:12 AM    Triglyceride 149 11/16/2019 03:12 AM    CHOL/HDL Ratio 9.2 (H) 11/16/2019 03:12 AM     No results found for: GLUCPOC  Lab Results   Component Value Date/Time    Color DARK YELLOW 11/12/2019 03:40 PM    Appearance CLOUDY (A) 11/12/2019 03:40 PM    Specific gravity 1.023 11/12/2019 03:40 PM    pH (UA) 6.5 11/12/2019 03:40 PM    Protein 100 (A) 11/12/2019 03:40 PM    Glucose 100 (A) 11/12/2019 03:40 PM    Ketone TRACE (A) 11/12/2019 03:40 PM    Urobilinogen 4.0 (H) 11/12/2019 03:40 PM    Nitrites NEGATIVE  11/12/2019 03:40 PM    Leukocyte Esterase NEGATIVE  11/12/2019 03:40 PM    Epithelial cells FEW 11/12/2019 03:40 PM    Bacteria NEGATIVE  11/12/2019 03:40 PM    WBC 0-4 11/12/2019 03:40 PM    RBC 0-5 11/12/2019 03:40 PM         Medications Reviewed:     Current Facility-Administered Medications   Medication Dose Route Frequency    oxyCODONE-acetaminophen (PERCOCET) 5-325 mg per tablet 1 Tab  1 Tab Oral Q4H PRN    lactobac ac& pc-s.therm-b.anim (XIOMARA Q/RISAQUAD)  1 Cap Oral DAILY    piperacillin-tazobactam (ZOSYN) 3.375 g in 0.9% sodium chloride (MBP/ADV) 100 mL  3.375 g IntraVENous Q8H    albuterol-ipratropium (DUO-NEB) 2.5 MG-0.5 MG/3 ML  3 mL Nebulization Q4H PRN    acetaminophen (TYLENOL) tablet 650 mg  650 mg Oral Q6H PRN    ondansetron (ZOFRAN) injection 4 mg  4 mg IntraVENous Q8H PRN    sodium chloride (NS) flush 5-40 mL  5-40 mL IntraVENous Q8H    sodium chloride (NS) flush 5-40 mL  5-40 mL IntraVENous PRN     ______________________________________________________________________  EXPECTED LENGTH OF STAY: 4d 19h  ACTUAL LENGTH OF STAY:          5                 Carloz Rider MD     Patient's emergency contacts:  Extended Emergency Contact Information  Primary Emergency Contact: None, Given  Home Phone: [phone]  Relation: Other Non-relative  Secondary Emergency Contact: Андрей Velarde  Mobile Phone: [phone]  Relation: Brother

## 2019-11-18 ENCOUNTER — APPOINTMENT (OUTPATIENT)
Dept: VASCULAR SURGERY | Age: 59
DRG: 720 | End: 2019-11-18
Attending: INTERNAL MEDICINE
Payer: MEDICAID

## 2019-11-18 ENCOUNTER — APPOINTMENT (OUTPATIENT)
Dept: CT IMAGING | Age: 59
DRG: 720 | End: 2019-11-18
Attending: SURGERY
Payer: MEDICAID

## 2019-11-18 LAB
ANION GAP SERPL CALC-SCNC: 5 MMOL/L (ref 5–15)
BACTERIA SPEC CULT: ABNORMAL
BASOPHILS # BLD: 0 K/UL (ref 0–0.1)
BASOPHILS NFR BLD: 0 % (ref 0–1)
BUN SERPL-MCNC: 7 MG/DL (ref 6–20)
BUN/CREAT SERPL: 9 (ref 12–20)
CALCIUM SERPL-MCNC: 8 MG/DL (ref 8.5–10.1)
CHLORIDE SERPL-SCNC: 101 MMOL/L (ref 97–108)
CO2 SERPL-SCNC: 25 MMOL/L (ref 21–32)
CREAT SERPL-MCNC: 0.79 MG/DL (ref 0.7–1.3)
DIFFERENTIAL METHOD BLD: ABNORMAL
EOSINOPHIL # BLD: 0 K/UL (ref 0–0.4)
EOSINOPHIL NFR BLD: 0 % (ref 0–7)
ERYTHROCYTE [DISTWIDTH] IN BLOOD BY AUTOMATED COUNT: 13.3 % (ref 11.5–14.5)
GLUCOSE SERPL-MCNC: 111 MG/DL (ref 65–100)
HCT VFR BLD AUTO: 37.7 % (ref 36.6–50.3)
HGB BLD-MCNC: 12.3 G/DL (ref 12.1–17)
IMM GRANULOCYTES # BLD AUTO: 0 K/UL
IMM GRANULOCYTES NFR BLD AUTO: 0 %
LYMPHOCYTES # BLD: 1.6 K/UL (ref 0.8–3.5)
LYMPHOCYTES NFR BLD: 12 % (ref 12–49)
MCH RBC QN AUTO: 29.6 PG (ref 26–34)
MCHC RBC AUTO-ENTMCNC: 32.6 G/DL (ref 30–36.5)
MCV RBC AUTO: 90.8 FL (ref 80–99)
METAMYELOCYTES NFR BLD MANUAL: 1 %
MONOCYTES # BLD: 0.1 K/UL (ref 0–1)
MONOCYTES NFR BLD: 1 % (ref 5–13)
NEUTS BAND NFR BLD MANUAL: 4 % (ref 0–6)
NEUTS SEG # BLD: 11.4 K/UL (ref 1.8–8)
NEUTS SEG NFR BLD: 82 % (ref 32–75)
NRBC # BLD: 0 K/UL (ref 0–0.01)
NRBC BLD-RTO: 0 PER 100 WBC
PLATELET # BLD AUTO: 259 K/UL (ref 150–400)
PMV BLD AUTO: 10.2 FL (ref 8.9–12.9)
POTASSIUM SERPL-SCNC: 4 MMOL/L (ref 3.5–5.1)
RBC # BLD AUTO: 4.15 M/UL (ref 4.1–5.7)
RBC MORPH BLD: ABNORMAL
SERVICE CMNT-IMP: ABNORMAL
SERVICE CMNT-IMP: ABNORMAL
SODIUM SERPL-SCNC: 131 MMOL/L (ref 136–145)
WBC # BLD AUTO: 13.3 K/UL (ref 4.1–11.1)

## 2019-11-18 PROCEDURE — 74177 CT ABD & PELVIS W/CONTRAST: CPT

## 2019-11-18 PROCEDURE — 65270000032 HC RM SEMIPRIVATE

## 2019-11-18 PROCEDURE — 74011000258 HC RX REV CODE- 258: Performed by: RADIOLOGY

## 2019-11-18 PROCEDURE — 74011000258 HC RX REV CODE- 258: Performed by: SURGERY

## 2019-11-18 PROCEDURE — 85025 COMPLETE CBC W/AUTO DIFF WBC: CPT

## 2019-11-18 PROCEDURE — 36415 COLL VENOUS BLD VENIPUNCTURE: CPT

## 2019-11-18 PROCEDURE — 80048 BASIC METABOLIC PNL TOTAL CA: CPT

## 2019-11-18 PROCEDURE — 74011636320 HC RX REV CODE- 636/320: Performed by: RADIOLOGY

## 2019-11-18 PROCEDURE — 74011250637 HC RX REV CODE- 250/637: Performed by: INTERNAL MEDICINE

## 2019-11-18 PROCEDURE — 74011250636 HC RX REV CODE- 250/636: Performed by: SURGERY

## 2019-11-18 RX ORDER — SODIUM CHLORIDE 0.9 % (FLUSH) 0.9 %
10 SYRINGE (ML) INJECTION
Status: COMPLETED | OUTPATIENT
Start: 2019-11-18 | End: 2019-11-18

## 2019-11-18 RX ADMIN — OXYCODONE AND ACETAMINOPHEN 1 TABLET: 5; 325 TABLET ORAL at 18:25

## 2019-11-18 RX ADMIN — PIPERACILLIN SODIUM AND TAZOBACTAM SODIUM 3.38 G: 3; .375 INJECTION, POWDER, LYOPHILIZED, FOR SOLUTION INTRAVENOUS at 01:38

## 2019-11-18 RX ADMIN — Medication 1 CAPSULE: at 09:20

## 2019-11-18 RX ADMIN — IOPAMIDOL 100 ML: 755 INJECTION, SOLUTION INTRAVENOUS at 12:08

## 2019-11-18 RX ADMIN — IOHEXOL 50 ML: 240 INJECTION, SOLUTION INTRATHECAL; INTRAVASCULAR; INTRAVENOUS; ORAL at 12:09

## 2019-11-18 RX ADMIN — PIPERACILLIN SODIUM AND TAZOBACTAM SODIUM 3.38 G: 3; .375 INJECTION, POWDER, LYOPHILIZED, FOR SOLUTION INTRAVENOUS at 10:38

## 2019-11-18 RX ADMIN — PIPERACILLIN SODIUM AND TAZOBACTAM SODIUM 3.38 G: 3; .375 INJECTION, POWDER, LYOPHILIZED, FOR SOLUTION INTRAVENOUS at 18:26

## 2019-11-18 RX ADMIN — OXYCODONE AND ACETAMINOPHEN 1 TABLET: 5; 325 TABLET ORAL at 01:38

## 2019-11-18 RX ADMIN — Medication 10 ML: at 12:08

## 2019-11-18 RX ADMIN — OXYCODONE AND ACETAMINOPHEN 1 TABLET: 5; 325 TABLET ORAL at 07:47

## 2019-11-18 RX ADMIN — SODIUM CHLORIDE 100 ML: 900 INJECTION, SOLUTION INTRAVENOUS at 12:08

## 2019-11-18 NOTE — PROGRESS NOTES
Hospitalist Progress Note  Jennifer Lincoln MD  Answering service: [phone] from in house phone        Date of Service:  2019  NAME:  Daryn Dye III  :  1960  MRN:  [id number]  PCP: Theron Muniz MD    Chief Complaint:   Chief Complaint   Patient presents with    Diarrhea    Cough         Admission Summary:     Mary Bradford is a 62 y.o. male who presented with diarrhea, abdominal pain    Interval history / Subjective:   Patient seen for Follow up of CC; sepsis  Pt seen and examined, comfortable, afebrile. No new complaints. CT abd to be done today. no NVD. DW NS, ANA overnight     Assessment & Plan:     - Sepsis, sec to severe colonic diverticulitis with microperforation and liver abscess   status post liver biopsy 2019 by IR- resolving  Blood cultures x2/4  Fuscobacterium. ? Will look for CUS/thrombus. Repeat BC x 2 : NGTD  Liver biopsy and cultures:  anaerobic GNR, BL neg, final identification pending. Significantly elevated procalcitonin level at 47, repeat level is much improved to 16.4. Continue IV Zosyn, ID recommendations noted. Patient aware that he will require longer (4-6wks) of IV abx and until radiological resolution has been achieved. TTE neg for vegetations, consult cards for LATANYA. General surgery consult appreciated, no acute surgical indication. Diet increased to regular with low fiber diet. General surgery plans to repeat CT scan of the abdomen pelvis with contrast today. - Lactic acidosis sec to sepsis  Resolved    Acute respiratory insufficiency, volume overload   Resolved, off supplemental oxygen. - Hyponatremia: sec to sepsis and dehydration  improving  Repeat BMP in am daily    -Recurrent hypokalemia: resolved, replete prn    - elevated troponin  Likely due to sepsis/tachycardia/demand supply mismatch  Cards consult noted, no ACS as per cardiology.   Echocardiogram noted, normal LVEF, cardio no follow-up with outpatient. No active CP, hx reported hx of CAD    -Morbid obesity: Weight loss recommended    Code status: Full Code    DVT prophylaxis: SCDs    Care Plan discussed with: Patient/Family, Nurse and Consultant Gen Surg    Disposition: TBD    Hospital Problems  Date Reviewed: 2019          Codes Class Noted POA    * (Principal) Sepsis ([facility] [address] ICD-10-CM: A41.9  ICD-9-CM: 038.9, 995.91  2019 Yes        Liver abscess ICD-10-CM: K75.0  ICD-9-CM: 572.0  2019 Yes        Gram-neg septicemia ([facility] [address] ICD-10-CM: A41.50  ICD-9-CM: 038.40  2019 Yes        Diverticulitis of large intestine without bleeding ICD-10-CM: K57.32  ICD-9-CM: 562.11  2019 Yes        Hyponatremia ICD-10-CM: E87.1  ICD-9-CM: 276.1  2019 Yes                Review of Systems:   A comprehensive review of systems was negative. Physical Examination:     General appearance: Alert, awake, appears older than his stated age, comfortable  Eyes: conjunctivae/corneas clear. PERRL. Lungs: clear to auscultation bilaterally, no rales, wheezing or rhonchi  Heart: sinus tachycardia, no murmurs, no gallops  Abdomen: soft, NT, NG, NR  Neurologic: AAOx3, able to move all extremities    Vital Signs:    Last 24hrs VS reviewed since prior progress note. Most recent are:    Visit Vitals  /79   Pulse 84   Temp 98 °F (36.7 °C)   Resp 18   Ht 5' 11\" (1.803 m)   Wt 105.7 kg (233 lb 1.6 oz)   SpO2 96%   BMI 32.51 kg/m²         Intake/Output Summary (Last 24 hours) at 2019 1026  Last data filed at 2019 0540  Gross per 24 hour   Intake    Output 2050 ml   Net -2050 ml        Tmax:  Temp (24hrs), Av.6 °F (37 °C), Min:98 °F (36.7 °C), Max:99 °F (37.2 °C)      Data Review:   Data reviewed by myself:  Xr Chest Pa Lat    Result Date: 2019  INDICATION:  febrile illness Exam: Chest 2 views. Comparison: 3/22/2017.  Findings: Cardiomediastinal silhouette is normal. Pulmonary vasculature is slightly prominent and increased since the prior study. Mild interstitial prominence predominantly at the lung bases with minimal left basilar atelectasis. No pneumothorax or focal consolidation. . Chronic right-sided rib fractures. Impression: 1. Mild prominence of the central pulmonary vasculature with minimal interstitial prominence at the lung bases. [place] developing mild interstitial edema as opposed to atypical infection     Ct Chest Wo Cont    Result Date: 11/12/2019  EXAM:  CT thorax without contrast INDICATION:  Cough. COMPARISON: Chest radiographs 13/42/2210 TECHNIQUE: Helical CT of the chest is performed without intravenous contrast. Coronal and sagittal reformatted images are obtained. CT dose reduction was achieved through use of a standardized protocol tailored for this examination and automatic exposure control for dose modulation. Adaptive statistical iterative reconstruction (ASIR) was utilized. FINDINGS: The visualized thyroid gland is unremarkable. The aorta and main pulmonary artery are normal in caliber. There is coronary artery atherosclerosis. The heart size is normal.  There is no pericardial effusion. There are no enlarged axillary, mediastinal, or hilar lymph nodes. There are minimal subpleural reticular opacities in the lower lungs. There is minimal left basilar scarring or atelectasis. There is no suspicious lung nodule, mass, or consolidation. In the limited images of the upper abdomen, the partially imaged solid organs are unremarkable. Degenerative changes in the spine. There is no aggressive bony lesion. IMPRESSION: Minimal interstitial changes in the lower lungs with minimal left basilar scarring or atelectasis. No evidence for atypical infection or other acute abnormality. Ct Abd Pelv Wo Cont    Result Date: 11/12/2019  EXAM: CT ABD PELV WO CONT INDICATION: abd pain COMPARISON: CT chest 11/12/2019 CONTRAST:  None. TECHNIQUE: Thin axial images were obtained through the abdomen and pelvis. Coronal and sagittal reconstructions were generated. Oral contrast was not administered. CT dose reduction was achieved through use of a standardized protocol tailored for this examination and automatic exposure control for dose modulation. The absence of intravenous contrast material reduces the sensitivity for evaluation of the solid parenchymal organs of the abdomen. FINDINGS: LUNG BASES: There is mild bibasilar atelectasis. INCIDENTALLY IMAGED HEART AND MEDIASTINUM: Unremarkable. LIVER: Diffuse fatty infiltration of the liver. GALLBLADDER: Unremarkable. SPLEEN: No mass. PANCREAS: No mass or ductal dilatation. ADRENALS: Unremarkable. KIDNEYS/URETERS: No mass, calculus, or hydronephrosis. STOMACH: Unremarkable. SMALL BOWEL: No bowel obstruction or perforation COLON: Extensive diverticulosis of the colon. There is bowel wall thickening of the sigmoid colon with extensive diverticula as well as pericolonic inflammation, but no focal fluid collections. There is also a small amount of gas adjacent to the affected loop of sigmoid colon, which may be extraluminal. There is no free intraperitoneal air. APPENDIX: Unremarkable. PERITONEUM: No ascites RETROPERITONEUM: No lymphadenopathy or aortic aneurysm. REPRODUCTIVE ORGANS: The prostate is noted. URINARY BLADDER: No mass or calculus. BONES: No destructive bone lesion. ADDITIONAL COMMENTS: There is degenerative disc disease which is most advanced at L4-L5. IMPRESSION: 1. Concentric bowel wall thickening of a 7 cm segment of sigmoid colon, where there are extensive diverticula and mild pericolonic inflammation. This is consistent with colitis, which may be due to diverticulitis. Notably, there is a small amount of extraluminal gas suspected, immediately adjacent to the affected segment of sigmoid colon. This may represent a localized perforation. 2. No evidence of abscess or bowel obstruction. 3. Diffuse fatty infiltration of the liver.     [facility]    Result Date: 11/12/2019  INDICATION:  Elevated LFTs COMPARISON:  CT scan earlier today FINDINGS: Limited right upper quadrant ultrasound was performed. The liver is diffusely increased in echogenicity and enlarged. Within the left lobe of the liver, there is an ill-defined, mixed echogenicity mass, measuring 5.6 x 5.5 x 3.6 cm. . The common bile duct is normal measuring 3 mm in diameter. The gallbladder is normal. The right kidney measures 11.8 cm in length. IMPRESSION: 1. Enlarged, echogenic liver, compatible with diffuse fatty infiltration. 2. Complex mass in the left lobe of the liver measuring 5.6 cm. 3. No biliary ductal dilatation. Further evaluation with liver mass protocol MRI or CT is recommended. Xr Chest Port    Result Date: 11/13/2019  PORTABLE CHEST RADIOGRAPH/S: 11/13/2019 4:53 AM Clinical history: Wheezing and dyspnea INDICATION:   wheeze, sob COMPARISON: 11/12/2019 FINDINGS: AP portable upright view of the chest demonstrates a stable  cardiopericardial silhouette. The lungs are adequately expanded. Minimal interstitial edema. No pleural effusion or pneumothorax. The osseous structures are unremarkable. Patient is on a cardiac monitor. IMPRESSION: Minimal interstitial edema. No significant change. .     No results found for: SDES  Lab Results   Component Value Date/Time    Culture result: NO GROWTH AFTER 18 HOURS 11/17/2019 10:30 AM    Culture result: NO GROWTH 2 DAYS, AEROBICALLY 11/13/2019 04:15 PM    Culture result: (A) 11/13/2019 04:15 PM     MODERATE ANAEROBIC GRAM NEGATIVE RODS ISOLATED . .. PLEASE REFER TO [facility] [id number] FOR IDENTIFICATION     All Micro Results     Procedure Component Value Units Date/Time    CULTURE, BLOOD, PAIRED [[id number]]  (Abnormal) Collected:  11/12/19 0803    Order Status:  Completed Specimen:  Blood Updated:  11/18/19 1019     Special Requests: NO SPECIAL REQUESTS        Culture result:       FUSOBACTERIUM SPECIES BETA LACTAMASE NEGATIVE GROWING IN 2 OF 4 BOTTLES DRAWN (SITES = L AC AND R AC)                  PRELIMINARY REPORT OF ANAEROBIC GRAM NEGATIVE RODS GROWING IN 2 OF 4 BOTTLES DRAWN CALLED TO AND READ BACK BY Candance Maris, RN ON 11/17/19 AT 0647 TA.                  REMAINING BOTTLE(S) HAS/HAVE NO GROWTH IN 5 DAYS          CULTURE, ANAEROBIC [[id number]]  (Abnormal) Collected:  11/13/19 1600    Order Status:  Completed Specimen:  Abscess Updated:  11/18/19 0653     Special Requests: LIVER     Culture result:       MODERATE ANAEROBIC GRAM NEGATIVE RODS BETA LACTAMASE NEGATIVE            IDENTIFICATION TO FOLLOW       CULTURE, BLOOD, PAIRED [[id number]] Collected:  11/17/19 1030    Order Status:  Completed Specimen:  Blood Updated:  11/18/19 0532     Special Requests: NO SPECIAL REQUESTS        Culture result: NO GROWTH AFTER 18 HOURS       CULTURE, WOUND Jermaine Prost STAIN [[id number]]  (Abnormal) Collected:  11/13/19 1530    Order Status:  Completed Specimen:  Abscess Updated:  11/15/19 1416     Special Requests: LIVER        GRAM STAIN 3+ WBCS SEEN         NO ORGANISMS SEEN        Culture result: NO GROWTH IN 2 DAYS, AEROBICALLY            MODERATE ANAEROBIC GRAM NEGATIVE RODS ISOLATED . ... PLEASE REFER TO [facility] [id number], FOR IDENTIFICATION          CULTURE, Rosa Iselagracie Ahmadi STAIN [[id number]]  (Abnormal) Collected:  11/13/19 1615    Order Status:  Completed Specimen:  Abscess Updated:  11/15/19 1414     Special Requests: LIVER        GRAM STAIN 2+ WBCS SEEN         NO ORGANISMS SEEN        Culture result: NO GROWTH 2 DAYS, AEROBICALLY            MODERATE ANAEROBIC GRAM NEGATIVE RODS ISOLATED . .. PLEASE REFER TO [facility] [id number] FOR IDENTIFICATION          CULTURE, TISSUE Macrina Mondragon [[id number]] Collected:  11/13/19 1600    Order Status:  Canceled Specimen:  Liver     CULTURE, BODY FLUID W Diaz Santiago [[id number]] Collected:  11/13/19 1600    Order Status:  Canceled Specimen:  Miscellaneous Fluid     LEGIONELLA PNEUMOPHILA AG, URINE [[id number]] Collected:  11/12/19 1540    Order Status: Completed Specimen:  Urine Updated:  11/13/19 1536     Source URINE        L pneumophila S1 Ag, urine NEGATIVE         Comment: (NOTE)  Presumptive negative for L. pneumophila serogroup 1 antigen in urine,  suggesting no recent or current infection. Legionnaires' disease  cannot be ruled out since other serogroups and species may also  cause disease. Performed At: [address] [id number]  Ant Ag MD GO:[id number]         C. DIFFICILE AG & TOXIN A/B [[id number]] Collected:  11/12/19 1715    Order Status:  Canceled Specimen:  Stool     ENTERIC BACT. Antonio Mckeon [[id number]] Collected:  11/12/19 1715    Order Status:  Canceled     URINE CULTURE HOLD SAMPLE [[id number]] Collected:  11/12/19 1540    Order Status:  Completed Specimen:  Urine from Serum Updated:  11/12/19 1551     Urine culture hold       URINE ON HOLD IN MICROBIOLOGY DEPT FOR 3 DAYS. IF UNPRESERVED URINE IS SUBMITTED, IT CANNOT BE USED FOR ADDITIONAL TESTING AFTER 24 HRS, RECOLLECTION WILL BE REQUIRED. INFLUENZA A & B AG (RAPID TEST) [[id number]] Collected:  11/12/19 1312    Order Status:  Completed Specimen:  Nasopharyngeal from Nasal washing Updated:  11/12/19 1333     Influenza A Antigen NEGATIVE         Influenza B Antigen NEGATIVE              Labs: reviewed by myself. Recent Labs     11/18/19  0338 11/17/19  0232   WBC 13.3* 13.9*   HGB 12.3 13.2   HCT 37.7 40.7    212     Recent Labs     11/18/19  0338 11/17/19  0232 11/16/19  0312   * 133* 132*   K 4.0 3.5 3.5    100 99   CO2 25 30 26   BUN 7 9 9   CREA 0.79 0.89 0.72   * 128* 120*   CA 8.0* 7.7* 7.6*     No results for input(s): SGOT, GPT, ALT, AP, TBIL, TBILI, TP, ALB, GLOB, GGT, AML, LPSE in the last 72 hours. No lab exists for component: AMYP, HLPSE  No results for input(s): INR, PTP, APTT, INREXT, INREXT in the last 72 hours. No results for input(s): FE, TIBC, PSAT, FERR in the last 72 hours.    No results found for: FOL, RBCF   No results for input(s): PH, PCO2, PO2 in the last 72 hours. No results for input(s): CPK, CKNDX, TROIQ in the last 72 hours.     No lab exists for component: CPKMB  Lab Results   Component Value Date/Time    Cholesterol, total 83 11/16/2019 03:12 AM    HDL Cholesterol 9 11/16/2019 03:12 AM    LDL, calculated 44.2 11/16/2019 03:12 AM    Triglyceride 149 11/16/2019 03:12 AM    CHOL/HDL Ratio 9.2 (H) 11/16/2019 03:12 AM     No results found for: GLUCPOC  Lab Results   Component Value Date/Time    Color DARK YELLOW 11/12/2019 03:40 PM    Appearance CLOUDY (A) 11/12/2019 03:40 PM    Specific gravity 1.023 11/12/2019 03:40 PM    pH (UA) 6.5 11/12/2019 03:40 PM    Protein 100 (A) 11/12/2019 03:40 PM    Glucose 100 (A) 11/12/2019 03:40 PM    Ketone TRACE (A) 11/12/2019 03:40 PM    Urobilinogen 4.0 (H) 11/12/2019 03:40 PM    Nitrites NEGATIVE  11/12/2019 03:40 PM    Leukocyte Esterase NEGATIVE  11/12/2019 03:40 PM    Epithelial cells FEW 11/12/2019 03:40 PM    Bacteria NEGATIVE  11/12/2019 03:40 PM    WBC 0-4 11/12/2019 03:40 PM    RBC 0-5 11/12/2019 03:40 PM         Medications Reviewed:     Current Facility-Administered Medications   Medication Dose Route Frequency    sodium chloride 0.9 % bolus infusion 100 mL  100 mL IntraVENous RAD ONCE    iohexol (OMNIPAQUE) 240 mg iodine/mL solution 50 mL  50 mL Oral RAD ONCE    iopamidol (ISOVUE-370) 76 % injection 100 mL  100 mL IntraVENous RAD ONCE    sodium chloride (NS) flush 10 mL  10 mL IntraVENous RAD ONCE    oxyCODONE-acetaminophen (PERCOCET) 5-325 mg per tablet 1 Tab  1 Tab Oral Q4H PRN    lactobac ac& pc-s.therm-b.anim (XIOMARA Q/RISAQUAD)  1 Cap Oral DAILY    piperacillin-tazobactam (ZOSYN) 3.375 g in 0.9% sodium chloride (MBP/ADV) 100 mL  3.375 g IntraVENous Q8H    albuterol-ipratropium (DUO-NEB) 2.5 MG-0.5 MG/3 ML  3 mL Nebulization Q4H PRN    acetaminophen (TYLENOL) tablet 650 mg  650 mg Oral Q6H PRN    ondansetron (ZOFRAN) injection 4 mg 4 mg IntraVENous Q8H PRN    sodium chloride (NS) flush 5-40 mL  5-40 mL IntraVENous Q8H    sodium chloride (NS) flush 5-40 mL  5-40 mL IntraVENous PRN     ______________________________________________________________________  EXPECTED LENGTH OF STAY: 4d 19h  ACTUAL LENGTH OF STAY:          6                 Sophy Rodriguez MD     Patient's emergency contacts:  Extended Emergency Contact Information  Primary Emergency Contact: None, Given  Home Phone: [phone]  Relation: Other Non-relative  Secondary Emergency Contact: Cyril Wu  Mobile Phone: [phone]  Relation:

## 2019-11-18 NOTE — PROGRESS NOTES
Mr. Chintan Martino has no specific complaints today. Tolerating low fiber diet. Tm 99 Tc 98 HR: 84 BP: 126/79 Resp Rate: 18 96% sat on room air. Intake/Output Summary (Last 24 hours) at 11/18/2019 1335  Last data filed at 11/18/2019 1047  Gross per 24 hour   Intake 0 ml   Output 3180 ml   Net -3180 ml   Exam: Cor: RRR. Lungs: Bilateral breath sounds. Clear to auscultation. Abd: Soft. Slightly distended. Tender in RUQ. No guarding or rebound. Labs:   Recent Results (from the past 12 hour(s))   CBC WITH AUTOMATED DIFF    Collection Time: 11/18/19  3:38 AM   Result Value Ref Range    WBC 13.3 (H) 4.1 - 11.1 K/uL    RBC 4.15 4.10 - 5.70 M/uL    HGB 12.3 12.1 - 17.0 g/dL    HCT 37.7 36.6 - 50.3 %    MCV 90.8 80.0 - 99.0 FL    MCH 29.6 26.0 - 34.0 PG    MCHC 32.6 30.0 - 36.5 g/dL    RDW 13.3 11.5 - 14.5 %    PLATELET 172 025 - 516 K/uL    MPV 10.2 8.9 - 12.9 FL    NRBC 0.0 0  WBC    ABSOLUTE NRBC 0.00 0.00 - 0.01 K/uL    NEUTROPHILS 82 (H) 32 - 75 %    BAND NEUTROPHILS 4 0 - 6 %    LYMPHOCYTES 12 12 - 49 %    MONOCYTES 1 (L) 5 - 13 %    EOSINOPHILS 0 0 - 7 %    BASOPHILS 0 0 - 1 %    METAMYELOCYTES 1 (H) 0 %    IMMATURE GRANULOCYTES 0 %    ABS. NEUTROPHILS 11.4 (H) 1.8 - 8.0 K/UL    ABS. LYMPHOCYTES 1.6 0.8 - 3.5 K/UL    ABS. MONOCYTES 0.1 0.0 - 1.0 K/UL    ABS. EOSINOPHILS 0.0 0.0 - 0.4 K/UL    ABS. BASOPHILS 0.0 0.0 - 0.1 K/UL    ABS. IMM.  GRANS. 0.0 K/UL    DF MANUAL      RBC COMMENTS NORMOCYTIC, NORMOCHROMIC     METABOLIC PANEL, BASIC    Collection Time: 11/18/19  3:38 AM   Result Value Ref Range    Sodium 131 (L) 136 - 145 mmol/L    Potassium 4.0 3.5 - 5.1 mmol/L    Chloride 101 97 - 108 mmol/L    CO2 25 21 - 32 mmol/L    Anion gap 5 5 - 15 mmol/L    Glucose 111 (H) 65 - 100 mg/dL    BUN 7 6 - 20 MG/DL    Creatinine 0.79 0.70 - 1.30 MG/DL    BUN/Creatinine ratio 9 (L) 12 - 20      GFR est AA >60 >60 ml/min/1.73m2    GFR est non-AA >60 >60 ml/min/1.73m2    Calcium 8.0 (L) 8.5 - 10.1 MG/DL   Reviewed CT Scan abdomen/pelvis. IV abx - Zosyn per Dr. Wero Naylor.   Low fiber diet as tolerated. OOB, Ambulate. Anti-emetics and pain medication as needed   Plans per Dr. Lisa Victoriao.

## 2019-11-18 NOTE — PROGRESS NOTES
Infectious DiseaseProgress Note      HPI:       Mr Jeet Gross seen earlier  Was awaiting CT   Was wanting food   Denied fever, chills, nausea, vomiting, abd pain, diarrhea   Says stool is soft   Denied dental or neck pain       Current Facility-Administered Medications:     oxyCODONE-acetaminophen (PERCOCET) 5-325 mg per tablet 1 Tab, 1 Tab, Oral, Q4H PRN, Natalie Leija MD, 1 Tab at 11/18/19 0747    lactobac ac& pc-s.therm-b.anim (XIOMARA Q/RISAQUAD), 1 Cap, Oral, DAILY, Natalie Leija MD, 1 Cap at 11/18/19 0920    piperacillin-tazobactam (ZOSYN) 3.375 g in 0.9% sodium chloride (MBP/ADV) 100 mL, 3.375 g, IntraVENous, Q8H, Krista Sorenson MD, Last Rate: 25 mL/hr at 11/18/19 1038, 3.375 g at 11/18/19 1038    albuterol-ipratropium (DUO-NEB) 2.5 MG-0.5 MG/3 ML, 3 mL, Nebulization, Q4H PRN, Natalie Leija MD    acetaminophen (TYLENOL) tablet 650 mg, 650 mg, Oral, Q6H PRN, Natalie Leija MD, 650 mg at 11/15/19 1540    ondansetron (ZOFRAN) injection 4 mg, 4 mg, IntraVENous, Q8H PRN, Son Thapa MD    sodium chloride (NS) flush 5-40 mL, 5-40 mL, IntraVENous, Q8H, Son Thapa MD, 10 mL at 11/16/19 0639    sodium chloride (NS) flush 5-40 mL, 5-40 mL, IntraVENous, PRN, Son Thapa MD          Physical Exam:    Vitals:   Patient Vitals for the past 24 hrs:   Temp Pulse Resp BP SpO2   11/18/19 0755 98 °F (36.7 °C) 84 18 126/79 96 %   11/17/19 2339 98.7 °F (37.1 °C) 95 18 132/82 95 %   11/17/19 1529 99 °F (37.2 °C) 87 18 110/73 98 %     · GEN: NAD,  · HEENT: Normocephalic, atraumatic, , no scleral icterus,  no cervical lymphadenopathy, no sinus tenderness, no thrush, missing tooth in the front, non tender to palpation of neck bilaterally   · CV: S1, S2 heard regularly,  · Lungs: Clear to auscultation bilaterally  · Abdomen: soft, non distended, non tender, no rash   · Extremities: no edema  · Skin: no rash        Labs:   Recent Results (from the past 24 hour(s))   CBC WITH AUTOMATED DIFF Collection Time: 11/18/19  3:38 AM   Result Value Ref Range    WBC 13.3 (H) 4.1 - 11.1 K/uL    RBC 4.15 4.10 - 5.70 M/uL    HGB 12.3 12.1 - 17.0 g/dL    HCT 37.7 36.6 - 50.3 %    MCV 90.8 80.0 - 99.0 FL    MCH 29.6 26.0 - 34.0 PG    MCHC 32.6 30.0 - 36.5 g/dL    RDW 13.3 11.5 - 14.5 %    PLATELET 514 656 - 079 K/uL    MPV 10.2 8.9 - 12.9 FL    NRBC 0.0 0  WBC    ABSOLUTE NRBC 0.00 0.00 - 0.01 K/uL    NEUTROPHILS 82 (H) 32 - 75 %    BAND NEUTROPHILS 4 0 - 6 %    LYMPHOCYTES 12 12 - 49 %    MONOCYTES 1 (L) 5 - 13 %    EOSINOPHILS 0 0 - 7 %    BASOPHILS 0 0 - 1 %    METAMYELOCYTES 1 (H) 0 %    IMMATURE GRANULOCYTES 0 %    ABS. NEUTROPHILS 11.4 (H) 1.8 - 8.0 K/UL    ABS. LYMPHOCYTES 1.6 0.8 - 3.5 K/UL    ABS. MONOCYTES 0.1 0.0 - 1.0 K/UL    ABS. EOSINOPHILS 0.0 0.0 - 0.4 K/UL    ABS. BASOPHILS 0.0 0.0 - 0.1 K/UL    ABS. IMM.  GRANS. 0.0 K/UL    DF MANUAL      RBC COMMENTS NORMOCYTIC, NORMOCHROMIC     METABOLIC PANEL, BASIC    Collection Time: 11/18/19  3:38 AM   Result Value Ref Range    Sodium 131 (L) 136 - 145 mmol/L    Potassium 4.0 3.5 - 5.1 mmol/L    Chloride 101 97 - 108 mmol/L    CO2 25 21 - 32 mmol/L    Anion gap 5 5 - 15 mmol/L    Glucose 111 (H) 65 - 100 mg/dL    BUN 7 6 - 20 MG/DL    Creatinine 0.79 0.70 - 1.30 MG/DL    BUN/Creatinine ratio 9 (L) 12 - 20      GFR est AA >60 >60 ml/min/1.73m2    GFR est non-AA >60 >60 ml/min/1.73m2    Calcium 8.0 (L) 8.5 - 10.1 MG/DL       Microbiology Data:     Blood 11/17/19  Component Value Ref Range & Units Status   Special Requests: NO SPECIAL REQUESTS    Preliminary   Culture result: NO GROWTH AFTER 18 HOURS    Preliminary   Result History                Blood: 11/12/19  Component Value Ref Range & Units Status   Special Requests: NO SPECIAL REQUESTS    Final   Culture result: Abnormal     Final   FUSOBACTERIUM SPECIES BETA LACTAMASE NEGATIVE GROWING IN 2 OF 4 BOTTLES DRAWN (SITES = L AC AND R AC)   Culture result: Abnormal     Final   PRELIMINARY REPORT OF ANAEROBIC GRAM NEGATIVE RODS GROWING IN 2 OF 4 BOTTLES DRAWN CALLED TO AND READ BACK BY Cass Silva RN ON 11/17/19 AT 4766 TA. Culture result:    Final   REMAINING BOTTLE(S) HAS/HAVE NO GROWTH IN 5 DAYS    Result History                 Abscess 11/13/19       Component Value Ref Range & Units Status   Special Requests: LIVER    Final   GRAM STAIN 3+ WBCS SEEN    Final   GRAM STAIN NO ORGANISMS SEEN    Final   Culture result:   Final   NO GROWTH IN 2 DAYS, AEROBICALLY    Culture result: Abnormal     Final   MODERATE ANAEROBIC GRAM NEGATIVE RODS ISOLATED . ... PLEASE REFER TO Nayla Camarillo [id number], FOR IDENTIFICATION    Result History     Specimen Information: Abscess        Component Value Ref Range & Units Status   Special Requests: LIVER   Final   Culture result: Abnormal     Final   MODERATE FUSOBACTERIUM SPECIES BETA LACTAMASE NEGATIVE    Result History         Component Value Ref Range & Units Status   Special Requests: LIVER    Final   GRAM STAIN 2+ WBCS SEEN    Final   GRAM STAIN NO ORGANISMS SEEN    Final   Culture result:   Final   NO GROWTH 2 DAYS, AEROBICALLY    Culture result: Abnormal     Final   MODERATE ANAEROBIC GRAM NEGATIVE RODS ISOLATED . .. PLEASE REFER TO Nayla Camarillo [id number] FOR IDENTIFICATION    Result History         Pathology Results:       No definite organisms identified on routine stains. Recommend correlation with pending microbiology testing. Amoebic infections may also result in liver abscess. Depending on clinical information, consider serologic testing Addendum Electronically Signed Out By Kathya Spear MD   mab/11/15/2019         Specimen Source   1: Liver, Core biopsy with touch intepretation:   CYTOLOGIC INTERPRETATION:   1.  Liver, Core biopsy with touch intepretation:   Liver parenchyma with acute and chronic inflammation and macrovesicular steatosis   See comment   General Categorization   No cells diagnostic for malignancy   Specimen Adequacy   Satisfactory for evaluation     Imaging: US ABD 11/12/19  COMPARISON:  CT scan earlier today     FINDINGS: Limited right upper quadrant ultrasound was performed. The liver is  diffusely increased in echogenicity and enlarged. Within the left lobe of the  liver, there is an ill-defined, mixed echogenicity mass, measuring 5.6 x 5.5 x  3.6 cm. . The common bile duct is normal measuring 3 mm in diameter. The  gallbladder is normal. The right kidney measures 11.8 cm in length.     IMPRESSION  IMPRESSION:      1. Enlarged, echogenic liver, compatible with diffuse fatty infiltration. 2. Complex mass in the left lobe of the liver measuring 5.6 cm.  3. No biliary ductal dilatation.     Further evaluation with liver mass protocol MRI or CT is recommended. CT ABD 11/13/19  FINDINGS:      LIVER: There is a 6.5 cm multiloculated low density cystic lesion in segment 2/3  of the left hepatic lobe concerning for abscess versus cystic neoplasm. Small  nonspecific low-density focus in the right hepatic lobe measuring 3.4 x 1.1 cm  (series 6, image 40). No other focal liver abnormality. No biliary ductal  dilatation   GALLBLADDER: Probable small stones but otherwise unremarkable in appearance. SPLEEN: Borderline splenomegaly  PANCREAS: No mass or ductal dilatation. ADRENALS: Unremarkable. KIDNEYS/URETERS: Symmetric nephrograms without evidence for focal mass. No  hydronephrosis. PERITONEUM: Borderline enlarged para-aortic retroperitoneal lymph nodes. No  other evidence for abdominal lymphadenopathy. No ascites. COLON: Inflammatory changes in the sigmoid colon are stable, likely related to  diverticulitis. Stable probable contained microperforation along the posterior  wall of the sigmoid colon (image 147). No free intraperitoneal gas. No evidence  for pericolonic abscess. APPENDIX: Unremarkable. SMALL BOWEL: No dilatation or wall thickening. STOMACH: Unremarkable. PELVIS: No pelvic lymphadenopathy. Trace pericolonic free fluid.  The bladder is  unremarkable. BONES: No destructive bone lesion. Mild to moderate lumbosacral spondylosis. VISUALIZED THORAX: Bibasilar subsegmental atelectasis  ADDITIONAL COMMENTS: N/A     IMPRESSION  IMPRESSION:     6 cm multiloculated low density/cystic lesion in the left hepatic lobe may  represent abscess in the appropriate clinical picture. Cystic neoplasm is  another possibility. There is a smaller 3 cm nonspecific low-density focus in  the right hepatic lobe.     Stable inflammatory changes involving the sigmoid colon. CT 11/12/19  COMPARISON: CT chest 11/12/2019     CONTRAST:  None.     TECHNIQUE:   Thin axial images were obtained through the abdomen and pelvis. Coronal and  sagittal reconstructions were generated. Oral contrast was not administered. CT  dose reduction was achieved through use of a standardized protocol tailored for  this examination and automatic exposure control for dose modulation.      The absence of intravenous contrast material reduces the sensitivity for  evaluation of the solid parenchymal organs of the abdomen.      FINDINGS:   LUNG BASES: There is mild bibasilar atelectasis. INCIDENTALLY IMAGED HEART AND MEDIASTINUM: Unremarkable. LIVER: Diffuse fatty infiltration of the liver. GALLBLADDER: Unremarkable. SPLEEN: No mass. PANCREAS: No mass or ductal dilatation. ADRENALS: Unremarkable. KIDNEYS/URETERS: No mass, calculus, or hydronephrosis. STOMACH: Unremarkable. SMALL BOWEL: No bowel obstruction or perforation  COLON: Extensive diverticulosis of the colon. There is bowel wall thickening of  the sigmoid colon with extensive diverticula as well as pericolonic  inflammation, but no focal fluid collections. There is also a small amount of  gas adjacent to the affected loop of sigmoid colon, which may be extraluminal.  There is no free intraperitoneal air. APPENDIX: Unremarkable. PERITONEUM: No ascites  RETROPERITONEUM: No lymphadenopathy or aortic aneurysm.   REPRODUCTIVE ORGANS: The prostate is noted. URINARY BLADDER: No mass or calculus. BONES: No destructive bone lesion. ADDITIONAL COMMENTS: There is degenerative disc disease which is most advanced  at L4-L5.     IMPRESSION  IMPRESSION:     1. Concentric bowel wall thickening of a 7 cm segment of sigmoid colon, where  there are extensive diverticula and mild pericolonic inflammation. This is  consistent with colitis, which may be due to diverticulitis. Notably, there is a  small amount of extraluminal gas suspected, immediately adjacent to the affected  segment of sigmoid colon. This may represent a localized perforation. 2. No evidence of abscess or bowel obstruction. 3. Diffuse fatty infiltration of the liver. FINDINGS:   The visualized thyroid gland is unremarkable. The aorta and main pulmonary  artery are normal in caliber. There is coronary artery atherosclerosis. The  heart size is normal.  There is no pericardial effusion.       There are no enlarged axillary, mediastinal, or hilar lymph nodes.      There are minimal subpleural reticular opacities in the lower lungs. There is  minimal left basilar scarring or atelectasis. There is no suspicious lung  nodule, mass, or consolidation.     In the limited images of the upper abdomen, the partially imaged solid organs  are unremarkable. Degenerative changes in the spine. There is no aggressive bony  lesion.     IMPRESSION  IMPRESSION:   Minimal interstitial changes in the lower lungs with minimal left basilar  scarring or atelectasis. No evidence for atypical infection or other acute  abnormality. TTE  Left Ventricle Normal cavity size, wall thickness, systolic function (ejection fraction normal) and diastolic function. The muscle mass is normal. The cavity shape is normal. The estimated ejection fraction is 56 - 60%. No regional wall motion abnormality noted. End-systolic volume is normal. Normal left ventricular strain. Normal left ventricular diastolic pressure. End-diastolic volume is normal.   Wall Scoring The left ventricular wall motion is normal.            Left Atrium Normal cavity size. No atrial septal defect present. Right Ventricle Normal cavity size, wall thickness and global systolic function. Right Atrium Normal cavity size. Interatrial Septum No atrial septal defect present. No interatrial septal aneurysm present. .   Aortic Valve Normal valve structure, trileaflet valve structure, no stenosis and no regurgitation. Mitral Valve Normal valve structure, no stenosis and no regurgitation. Tricuspid Valve Normal valve structure, no stenosis and no regurgitation. Pulmonic Valve Pulmonic valve not well visualized. Normal valve structure, no stenosis and no regurgitation. Aorta Normal aortic root, ascending aortic, and aortic arch. IVC/Hepatic Veins Mildly elevated central venous pressure (5-10 mmHg); IVC diameter is less than 21 mm and collapses less than 50% with respiration. Procedures:     98/94/66  Uncomplicated CT-guided left lobe liver mass biopsy and aspiration      Assessment / Plan:      Mr Sam Shahid is a 68-year-old gentleman reports no significant past medical history admitted on 11/12/2019 with abdominal pain and diarrhea. He reports having a [birth year] on [birth year] and made a large pot of spaghetti. That evening he noticed he had explosive diarrhea. Started experiencing abdominal pain and started feeling very sick and therefore presented to the hospital.  Found to have diverticulitis with microperforation and work-up also showed liver abscess/mass . He had this drained by CT-guided biopsy/drainage on 11/13/2019. He says he feels better but still very tired and recuperating from his recent illness. Says diarrhea is resolved . Denies any fevers chills or night sweats ongoing . Has started eating. Overall improved . Tolerating antibiotics without any issues at this time. From chart review, his T-max is 99.9.   He has leukocytosis that is uptrending to 18.2. His renal function is normal.  His LFTs are elevated but trending down. 1) Fusobacterium bacteremia   Usually seen in oral/dental, deep neck infections  He denied any dental or neck pain  Seeding suspected from liver abscess   TTE noted   May need  LATANYA slow growing indolent infections   Check US carotids/neck vessels to ensure no thrombus etc that could be seeded   Await repeat cx   On Zosyn IV       2) Liver abscess   Status post drainage on 11/13/2019  Cultures pending but so far GNR anaerobic . On Zosyn   Await repeat CT   Usually liver abscesses are treated until they have resolved for 4 to 6 weeks with clinical monitoring as well as radiographic monitoring to assess response to treatment    3) diverticulitis with microperforation  Evaluated by surgery  On IV Zosyn    4) DVT PX               Thank for the opportunity to participate in the care of this patient. Please contact with questions or concerns.      Garcia Alvarado,   12:21 PM

## 2019-11-18 NOTE — PROGRESS NOTES
[facility] [address]  Daily Progress Note    Admit Date: 2019  Post-Operative Day: 5 from CT guided biopsy of periphery of liver mass and aspiration of 15 mL of pus. Subjective:     Last 24 hrs: persistent mild pain over upper mid-abdomen, controlled with current meds. Drinking contrast for CT. Did well with PO intake yesterday, had a BM this morning. WBC 13.3, Hgb 12.3. Na 131. Repeat blood culture from 19/ pending. Continues on zosyn. Objective:     Blood pressure 126/79, pulse 84, temperature 98 °F (36.7 °C), resp. rate 18, height 5' 11\" (1.803 m), weight 105.7 kg (233 lb 1.6 oz), SpO2 96 %. Temp (24hrs), Av.6 °F (37 °C), Min:98 °F (36.7 °C), Max:99 °F (37.2 °C)      _____________________  Physical Exam:     Alert and Oriented, sitting up in bed, no acute distress. Cardiovascular: RRR, no peripheral edema  Lungs:CTAB   Abdomen: soft, mild tenderness over midline of upper abdomen in location of puncture site from CT guided biopsy/aspiration.         Assessment:   Principal Problem:    Sepsis ([facility] [address] (2019)    Active Problems:    Hyponatremia (2019)      Liver abscess (2019)      Gram-neg septicemia ([facility] [address] (2019)      Diverticulitis of large intestine without bleeding (2019)            Plan:     Continue abx  F/u today's CT A/P results  Following      Manny Martin, [address]  Sur[facility] at [place] [address],  [facility], [address] [place] [address]  [phone]    Data Review:    Recent Labs     19  1774 19  0232 19  0312   WBC 13.3* 13.9* 14.8*   HGB 12.3 13.2 13.3   HCT 37.7 40.7 39.5    212 174     Recent Labs     19  0338 19  0232 192   * 133* 132*   K 4.0 3.5 3.5    100 99   CO2 25 30 26   * 128* 120*   BUN 7 9 9   CREA 0.79 0.89 0.72   CA 8.0* 7.7* 7.6*     No results for input(s): AML, LPSE in the last 72 hours.        ______________________  Medications:    Current Facility-Administered Medications   Medication Dose Route Frequency    sodium chloride 0.9 % bolus infusion 100 mL  100 mL IntraVENous RAD ONCE    iohexol (OMNIPAQUE) 240 mg iodine/mL solution 50 mL  50 mL Oral RAD ONCE    iopamidol (ISOVUE-370) 76 % injection 100 mL  100 mL IntraVENous RAD ONCE    sodium chloride (NS) flush 10 mL  10 mL IntraVENous RAD ONCE    oxyCODONE-acetaminophen (PERCOCET) 5-325 mg per tablet 1 Tab  1 Tab Oral Q4H PRN    lactobac ac& pc-s.therm-b.anim (XIOMARA Q/RISAQUAD)  1 Cap Oral DAILY    piperacillin-tazobactam (ZOSYN) 3.375 g in 0.9% sodium chloride (MBP/ADV) 100 mL  3.375 g IntraVENous Q8H    albuterol-ipratropium (DUO-NEB) 2.5 MG-0.5 MG/3 ML  3 mL Nebulization Q4H PRN    acetaminophen (TYLENOL) tablet 650 mg  650 mg Oral Q6H PRN    ondansetron (ZOFRAN) injection 4 mg  4 mg IntraVENous Q8H PRN    sodium chloride (NS) flush 5-40 mL  5-40 mL IntraVENous Q8H    sodium chloride (NS) flush 5-40 mL  5-40 mL IntraVENous PRN

## 2019-11-19 ENCOUNTER — APPOINTMENT (OUTPATIENT)
Dept: VASCULAR SURGERY | Age: 59
DRG: 720 | End: 2019-11-19
Attending: INTERNAL MEDICINE
Payer: MEDICAID

## 2019-11-19 LAB
ANION GAP SERPL CALC-SCNC: 8 MMOL/L (ref 5–15)
BASOPHILS # BLD: 0 K/UL (ref 0–0.1)
BASOPHILS NFR BLD: 0 % (ref 0–1)
BUN SERPL-MCNC: 8 MG/DL (ref 6–20)
BUN/CREAT SERPL: 9 (ref 12–20)
CALCIUM SERPL-MCNC: 8 MG/DL (ref 8.5–10.1)
CHLORIDE SERPL-SCNC: 101 MMOL/L (ref 97–108)
CO2 SERPL-SCNC: 23 MMOL/L (ref 21–32)
CREAT SERPL-MCNC: 0.92 MG/DL (ref 0.7–1.3)
DIFFERENTIAL METHOD BLD: ABNORMAL
EOSINOPHIL # BLD: 0 K/UL (ref 0–0.4)
EOSINOPHIL NFR BLD: 0 % (ref 0–7)
ERYTHROCYTE [DISTWIDTH] IN BLOOD BY AUTOMATED COUNT: 13.4 % (ref 11.5–14.5)
GLUCOSE SERPL-MCNC: 175 MG/DL (ref 65–100)
HCT VFR BLD AUTO: 36.5 % (ref 36.6–50.3)
HGB BLD-MCNC: 11.9 G/DL (ref 12.1–17)
IMM GRANULOCYTES # BLD AUTO: 0 K/UL
IMM GRANULOCYTES NFR BLD AUTO: 0 %
LEFT CCA DIST DIAS: 19.4 CM/S
LEFT CCA DIST SYS: 77.7 CM/S
LEFT CCA PROX DIAS: 18.3 CM/S
LEFT CCA PROX SYS: 78.8 CM/S
LEFT ECA DIAS: 6.03 CM/S
LEFT ECA SYS: 65.5 CM/S
LEFT ICA DIST DIAS: 22.7 CM/S
LEFT ICA DIST SYS: 66.7 CM/S
LEFT ICA MID DIAS: 22.7 CM/S
LEFT ICA MID SYS: 71.1 CM/S
LEFT ICA PROX DIAS: 23.8 CM/S
LEFT ICA PROX SYS: 78.8 CM/S
LEFT ICA/CCA SYS: 1.01
LEFT VERTEBRAL DIAS: 12.53 CM/S
LEFT VERTEBRAL SYS: 37.4 CM/S
LYMPHOCYTES # BLD: 1.3 K/UL (ref 0.8–3.5)
LYMPHOCYTES NFR BLD: 12 % (ref 12–49)
MCH RBC QN AUTO: 29.7 PG (ref 26–34)
MCHC RBC AUTO-ENTMCNC: 32.6 G/DL (ref 30–36.5)
MCV RBC AUTO: 91 FL (ref 80–99)
MONOCYTES # BLD: 0.7 K/UL (ref 0–1)
MONOCYTES NFR BLD: 7 % (ref 5–13)
NEUTS BAND NFR BLD MANUAL: 4 % (ref 0–6)
NEUTS SEG # BLD: 8.3 K/UL (ref 1.8–8)
NEUTS SEG NFR BLD: 74 % (ref 32–75)
NRBC # BLD: 0 K/UL (ref 0–0.01)
NRBC BLD-RTO: 0 PER 100 WBC
PLATELET # BLD AUTO: 275 K/UL (ref 150–400)
PLATELET COMMENTS,PCOM: ABNORMAL
PMV BLD AUTO: 10.2 FL (ref 8.9–12.9)
POTASSIUM SERPL-SCNC: 3.9 MMOL/L (ref 3.5–5.1)
PROMYELOCYTES NFR BLD MANUAL: 3 %
RBC # BLD AUTO: 4.01 M/UL (ref 4.1–5.7)
RBC MORPH BLD: ABNORMAL
RBC MORPH BLD: ABNORMAL
RIGHT CCA DIST DIAS: 19 CM/S
RIGHT CCA DIST SYS: 77.7 CM/S
RIGHT CCA PROX DIAS: 17.7 CM/S
RIGHT CCA PROX SYS: 94.7 CM/S
RIGHT ECA DIAS: 12.52 CM/S
RIGHT ECA SYS: 105.1 CM/S
RIGHT ICA DIST DIAS: 27.6 CM/S
RIGHT ICA DIST SYS: 65.1 CM/S
RIGHT ICA MID DIAS: 18 CM/S
RIGHT ICA MID SYS: 58.1 CM/S
RIGHT ICA PROX DIAS: 20.4 CM/S
RIGHT ICA PROX SYS: 81.7 CM/S
RIGHT ICA/CCA SYS: 1.1
RIGHT VERTEBRAL DIAS: 14.66 CM/S
RIGHT VERTEBRAL SYS: 46 CM/S
SODIUM SERPL-SCNC: 132 MMOL/L (ref 136–145)
WBC # BLD AUTO: 10.6 K/UL (ref 4.1–11.1)

## 2019-11-19 PROCEDURE — 65270000032 HC RM SEMIPRIVATE

## 2019-11-19 PROCEDURE — 74011000258 HC RX REV CODE- 258: Performed by: SURGERY

## 2019-11-19 PROCEDURE — 74011250636 HC RX REV CODE- 250/636: Performed by: SURGERY

## 2019-11-19 PROCEDURE — 74011250637 HC RX REV CODE- 250/637: Performed by: INTERNAL MEDICINE

## 2019-11-19 PROCEDURE — 80048 BASIC METABOLIC PNL TOTAL CA: CPT

## 2019-11-19 PROCEDURE — 93880 EXTRACRANIAL BILAT STUDY: CPT

## 2019-11-19 PROCEDURE — 85025 COMPLETE CBC W/AUTO DIFF WBC: CPT

## 2019-11-19 PROCEDURE — 36415 COLL VENOUS BLD VENIPUNCTURE: CPT

## 2019-11-19 RX ADMIN — Medication 10 ML: at 18:43

## 2019-11-19 RX ADMIN — OXYCODONE AND ACETAMINOPHEN 1 TABLET: 5; 325 TABLET ORAL at 16:13

## 2019-11-19 RX ADMIN — Medication 10 ML: at 22:17

## 2019-11-19 RX ADMIN — PIPERACILLIN SODIUM AND TAZOBACTAM SODIUM 3.38 G: 3; .375 INJECTION, POWDER, LYOPHILIZED, FOR SOLUTION INTRAVENOUS at 02:12

## 2019-11-19 RX ADMIN — PIPERACILLIN SODIUM AND TAZOBACTAM SODIUM 3.38 G: 3; .375 INJECTION, POWDER, LYOPHILIZED, FOR SOLUTION INTRAVENOUS at 10:51

## 2019-11-19 RX ADMIN — PIPERACILLIN SODIUM AND TAZOBACTAM SODIUM 3.38 G: 3; .375 INJECTION, POWDER, LYOPHILIZED, FOR SOLUTION INTRAVENOUS at 18:43

## 2019-11-19 RX ADMIN — OXYCODONE AND ACETAMINOPHEN 1 TABLET: 5; 325 TABLET ORAL at 00:28

## 2019-11-19 RX ADMIN — Medication 1 CAPSULE: at 09:43

## 2019-11-19 RX ADMIN — OXYCODONE AND ACETAMINOPHEN 1 TABLET: 5; 325 TABLET ORAL at 07:02

## 2019-11-19 RX ADMIN — OXYCODONE AND ACETAMINOPHEN 1 TABLET: 5; 325 TABLET ORAL at 20:49

## 2019-11-19 RX ADMIN — Medication 10 ML: at 07:02

## 2019-11-19 RX ADMIN — OXYCODONE AND ACETAMINOPHEN 1 TABLET: 5; 325 TABLET ORAL at 11:22

## 2019-11-19 RX ADMIN — Medication 10 ML: at 14:14

## 2019-11-19 NOTE — PROGRESS NOTES
Hospitalist Progress Note  Gricelda Perez MD  Answering service: [phone] from in house phone        Date of Service:  2019  NAME:  Daryn Dye III  :  1960  MRN:  [id number]  PCP: Theron Muniz MD          Admission Summary:     Mary Bradford is a 62 y.o. male who presented with diarrhea, abdominal pain    Interval history / Subjective:   Patient seen for Follow up of CC; sepsis  Patient notes the abdominal pain is better. 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 Carotid Doppler negative for thrombus  Repeat BC x 2 : NGTD  Liver biopsy and cultures:  anaerobic GNR, BL neg, final identification pending. Significantly elevated procalcitonin level at 47, repeat level is much improved to 16.4. Continue IV Zosyn, ID recommendations noted. Patient aware that he will require longer (4-6wks) of IV abx and until radiological resolution has been achieved. TTE neg for vegetations. Discussed with ID, LATANYA tomorrow  CT abdomen pelvis on 1118 still with persistent be related and hypodense liver lesion without significant change with possible increased surrounding edema. No evidence of diverticular abscess or perforation    - Lactic acidosis sec to sepsis  Resolved    Acute respiratory insufficiency, volume overload   Resolved, off supplemental oxygen. - Hyponatremia: sec to sepsis and dehydration  improving  Repeat BMP daily    -Recurrent hypokalemia: resolved, replete prn    #elevated troponin  Likely due to sepsis/tachycardia/demand supply mismatch  Cards consult noted, no ACS as per cardiology. Echocardiogram noted, normal LVEF, cardio no follow-up with outpatient.   No active CP, hx reported hx of CAD    -Morbid obesity: Weight loss recommended    Code status: Full Code    DVT prophylaxis: SCDs    Care Plan discussed with: Patient/Family, Nurse and Consultant Gen Surg    Disposition: TBD    Hospital Problems  Date Reviewed: 2019          Codes Class Noted POA    * (Principal) Sepsis ([facility] ICD-10-CM: A41.9  ICD-9-CM: 038.9, 995.91  2019 Yes        Liver abscess ICD-10-CM: K75.0  ICD-9-CM: 572.0  2019 Yes        Gram-neg septicemia ([facility] ICD-10-CM: A41.50  ICD-9-CM: 038.40  2019 Yes        Diverticulitis of large intestine without bleeding ICD-10-CM: K57.32  ICD-9-CM: 562.11  2019 Yes        Hyponatremia ICD-10-CM: E87.1  ICD-9-CM: 276.1  2019 Yes                Review of Systems:   A comprehensive review of systems was negative. Physical Examination:     General appearance: Alert, awake, appears older than his stated age, comfortable  Eyes: conjunctivae/corneas clear. PERRL. Lungs: clear to auscultation bilaterally, no rales, wheezing or rhonchi  Heart: sinus tachycardia, no murmurs, no gallops  Abdomen: soft, NT, NG, NR. Mild right upper quadrant tenderness without rebound or guarding  Neurologic: AAOx3, able to move all extremities    Vital Signs:    Last 24hrs VS reviewed since prior progress note. Most recent are:    Visit Vitals  /86 (BP 1 Location: Left arm, BP Patient Position: At rest)   Pulse 83   Temp 98.5 °F (36.9 °C)   Resp 16   Ht 5' 11\" (1.803 m)   Wt 103.3 kg (227 lb 12.8 oz)   SpO2 97%   BMI 31.77 kg/m²         Intake/Output Summary (Last 24 hours) at 2019 1843  Last data filed at 2019 1414  Gross per 24 hour   Intake 240 ml   Output 3200 ml   Net -2960 ml        Tmax:  Temp (24hrs), Av.6 °F (37 °C), Min:98.1 °F (36.7 °C), Max:99.1 °F (37.3 °C)      Data Review:   Data reviewed by myself:  Xr Chest Pa Lat    Result Date: 2019  INDICATION:  febrile illness Exam: Chest 2 views. Comparison: 3/22/2017.  Findings: Cardiomediastinal silhouette is normal. Pulmonary vasculature is slightly prominent and increased since the prior study. Mild interstitial prominence predominantly at the lung bases with minimal left basilar atelectasis. No pneumothorax or focal consolidation. . Chronic right-sided rib fractures. Impression: 1. Mild prominence of the central pulmonary vasculature with minimal interstitial prominence at the lung bases. Christa Beltrán developing mild interstitial edema as opposed to atypical infection     Ct Chest Wo Cont    Result Date: 11/12/2019  EXAM:  CT thorax without contrast INDICATION:  Cough. COMPARISON: Chest radiographs 33/76/8469 TECHNIQUE: Helical CT of the chest is performed without intravenous contrast. Coronal and sagittal reformatted images are obtained. CT dose reduction was achieved through use of a standardized protocol tailored for this examination and automatic exposure control for dose modulation. Adaptive statistical iterative reconstruction (ASIR) was utilized. FINDINGS: The visualized thyroid gland is unremarkable. The aorta and main pulmonary artery are normal in caliber. There is coronary artery atherosclerosis. The heart size is normal.  There is no pericardial effusion. There are no enlarged axillary, mediastinal, or hilar lymph nodes. There are minimal subpleural reticular opacities in the lower lungs. There is minimal left basilar scarring or atelectasis. There is no suspicious lung nodule, mass, or consolidation. In the limited images of the upper abdomen, the partially imaged solid organs are unremarkable. Degenerative changes in the spine. There is no aggressive bony lesion. IMPRESSION: Minimal interstitial changes in the lower lungs with minimal left basilar scarring or atelectasis. No evidence for atypical infection or other acute abnormality. Ct Abd Pelv Wo Cont    Result Date: 11/12/2019  EXAM: CT ABD PELV WO CONT INDICATION: abd pain COMPARISON: CT chest 11/12/2019 CONTRAST:  None.  TECHNIQUE: Thin axial images were obtained through the abdomen and pelvis. Coronal and sagittal reconstructions were generated. Oral contrast was not administered. CT dose reduction was achieved through use of a standardized protocol tailored for this examination and automatic exposure control for dose modulation. The absence of intravenous contrast material reduces the sensitivity for evaluation of the solid parenchymal organs of the abdomen. FINDINGS: LUNG BASES: There is mild bibasilar atelectasis. INCIDENTALLY IMAGED HEART AND MEDIASTINUM: Unremarkable. LIVER: Diffuse fatty infiltration of the liver. GALLBLADDER: Unremarkable. SPLEEN: No mass. PANCREAS: No mass or ductal dilatation. ADRENALS: Unremarkable. KIDNEYS/URETERS: No mass, calculus, or hydronephrosis. STOMACH: Unremarkable. SMALL BOWEL: No bowel obstruction or perforation COLON: Extensive diverticulosis of the colon. There is bowel wall thickening of the sigmoid colon with extensive diverticula as well as pericolonic inflammation, but no focal fluid collections. There is also a small amount of gas adjacent to the affected loop of sigmoid colon, which may be extraluminal. There is no free intraperitoneal air. APPENDIX: Unremarkable. PERITONEUM: No ascites RETROPERITONEUM: No lymphadenopathy or aortic aneurysm. REPRODUCTIVE ORGANS: The prostate is noted. URINARY BLADDER: No mass or calculus. BONES: No destructive bone lesion. ADDITIONAL COMMENTS: There is degenerative disc disease which is most advanced at L4-L5. IMPRESSION: 1. Concentric bowel wall thickening of a 7 cm segment of sigmoid colon, where there are extensive diverticula and mild pericolonic inflammation. This is consistent with colitis, which may be due to diverticulitis. Notably, there is a small amount of extraluminal gas suspected, immediately adjacent to the affected segment of sigmoid colon. This may represent a localized perforation. 2. No evidence of abscess or bowel obstruction.  3. Diffuse fatty infiltration of the liver.    [employer]    Result Date: 11/12/2019  INDICATION:  Elevated LFTs COMPARISON:  CT scan earlier today FINDINGS: Limited right upper quadrant ultrasound was performed. The liver is diffusely increased in echogenicity and enlarged. Within the left lobe of the liver, there is an ill-defined, mixed echogenicity mass, measuring 5.6 x 5.5 x 3.6 cm. . The common bile duct is normal measuring 3 mm in diameter. The gallbladder is normal. The right kidney measures 11.8 cm in length. IMPRESSION: 1. Enlarged, echogenic liver, compatible with diffuse fatty infiltration. 2. Complex mass in the left lobe of the liver measuring 5.6 cm. 3. No biliary ductal dilatation. Further evaluation with liver mass protocol MRI or CT is recommended. Xr Chest Port    Result Date: 11/13/2019  PORTABLE CHEST RADIOGRAPH/S: 11/13/2019 4:53 AM Clinical history: Wheezing and dyspnea INDICATION:   wheeze, sob COMPARISON: 11/12/2019 FINDINGS: AP portable upright view of the chest demonstrates a stable  cardiopericardial silhouette. The lungs are adequately expanded. Minimal interstitial edema. No pleural effusion or pneumothorax. The osseous structures are unremarkable. Patient is on a cardiac monitor. IMPRESSION: Minimal interstitial edema. No significant change. .     No results found for: SDES  Lab Results   Component Value Date/Time    Culture result: NO GROWTH 2 DAYS 11/17/2019 10:30 AM    Culture result: NO GROWTH 2 DAYS, AEROBICALLY 11/13/2019 04:15 PM    Culture result: (A) 11/13/2019 04:15 PM     MODERATE ANAEROBIC GRAM NEGATIVE RODS ISOLATED . .. PLEASE REFER TO [facility] [id number] FOR IDENTIFICATION     All Micro Results     Procedure Component Value Units Date/Time    CULTURE, BLOOD, PAIRED [[id number]] Collected:  11/17/19 1030    Order Status:  Completed Specimen:  Blood Updated:  11/19/19 0902     Special Requests: NO SPECIAL REQUESTS        Culture result: NO GROWTH 2 DAYS       CULTURE, ANAEROBIC [[id number]]  (Abnormal) Collected:  11/13/19 1600    Order Status:  Completed Specimen:  Abscess Updated:  11/18/19 1131     Special Requests: LIVER     Culture result:       MODERATE FUSOBACTERIUM SPECIES BETA LACTAMASE NEGATIVE          CULTURE, BLOOD, PAIRED [[id number]]  (Abnormal) Collected:  11/12/19 0803    Order Status:  Completed Specimen:  Blood Updated:  11/18/19 1019     Special Requests: NO SPECIAL REQUESTS        Culture result:       FUSOBACTERIUM SPECIES BETA LACTAMASE NEGATIVE GROWING IN 2 OF 4 BOTTLES DRAWN (SITES = L AC AND R AC)                  PRELIMINARY REPORT OF ANAEROBIC GRAM NEGATIVE RODS GROWING IN 2 OF 4 BOTTLES DRAWN CALLED TO AND READ BACK BY Shiv Fajardo RN ON 11/17/19 AT 8869 TA.                  REMAINING BOTTLE(S) HAS/HAVE NO GROWTH IN 5 DAYS          CULTURE, Henriettaa [place] STAIN [[id number]]  (Abnormal) Collected:  11/13/19 1530    Order Status:  Completed Specimen:  Abscess Updated:  11/15/19 1416     Special Requests: LIVER        GRAM STAIN 3+ WBCS SEEN         NO ORGANISMS SEEN        Culture result: NO GROWTH IN 2 DAYS, AEROBICALLY            MODERATE ANAEROBIC GRAM NEGATIVE RODS ISOLATED . ... PLEASE REFER TO [facility] [id number], FOR IDENTIFICATION          CULTURE, Henriettaa [place] STAIN [[id number]]  (Abnormal) Collected:  11/13/19 1615    Order Status:  Completed Specimen:  Abscess Updated:  11/15/19 1414     Special Requests: LIVER        GRAM STAIN 2+ WBCS SEEN         NO ORGANISMS SEEN        Culture result: NO GROWTH 2 DAYS, AEROBICALLY            MODERATE ANAEROBIC GRAM NEGATIVE RODS ISOLATED . .. PLEASE REFER TO [facility] [id number] FOR IDENTIFICATION          CULTURE, TISSUE Debby Cecille [[id number]] Collected:  11/13/19 1600    Order Status:  Canceled Specimen:  Liver     CULTURE, BODY FLUID W Ariadne Joseph [[id number]] Collected:  11/13/19 1600    Order Status:  Canceled Specimen:  Miscellaneous Fluid     LEGIONELLA PNEUMOPHILA AG, URINE [[id number]] Collected:  11/12/19 1540    Order Status:  Completed Specimen: Urine Updated:  11/13/19 1536     Source URINE        L pneumophila S1 Ag, urine NEGATIVE         Comment: (NOTE)  Presumptive negative for L. pneumophila serogroup 1 antigen in urine,  suggesting no recent or current infection. Legionnaires' disease  cannot be ruled out since other serogroups and species may also  cause disease. Performed At: [address] [id number]  Kaleigh Snyder MD AR:[id number]         C. DIFFICILE AG & TOXIN A/B [[id number]] Collected:  11/12/19 1715    Order Status:  Canceled Specimen:  Stool     ENTERIC BACT. Acfabienne Loyd [[id number]] Collected:  11/12/19 1715    Order Status:  Canceled     URINE CULTURE HOLD SAMPLE [[id number]] Collected:  11/12/19 1540    Order Status:  Completed Specimen:  Urine from Serum Updated:  11/12/19 1551     Urine culture hold       URINE ON HOLD IN MICROBIOLOGY DEPT FOR 3 DAYS. IF UNPRESERVED URINE IS SUBMITTED, IT CANNOT BE USED FOR ADDITIONAL TESTING AFTER 24 HRS, RECOLLECTION WILL BE REQUIRED. INFLUENZA A & B AG (RAPID TEST) [[id number]] Collected:  11/12/19 1312    Order Status:  Completed Specimen:  Nasopharyngeal from Nasal washing Updated:  11/12/19 1333     Influenza A Antigen NEGATIVE         Influenza B Antigen NEGATIVE              Labs: reviewed by myself. Recent Labs     11/19/19 0214 11/18/19 0338   WBC 10.6 13.3*   HGB 11.9* 12.3   HCT 36.5* 37.7    259     Recent Labs     11/19/19 0214 11/18/19 0338 11/17/19  0232   * 131* 133*   K 3.9 4.0 3.5    101 100   CO2 23 25 30   BUN 8 7 9   CREA 0.92 0.79 0.89   * 111* 128*   CA 8.0* 8.0* 7.7*     No results for input(s): SGOT, GPT, ALT, AP, TBIL, TBILI, TP, ALB, GLOB, GGT, AML, LPSE in the last 72 hours. No lab exists for component: AMYP, HLPSE  No results for input(s): INR, PTP, APTT, INREXT, INREXT in the last 72 hours. No results for input(s): FE, TIBC, PSAT, FERR in the last 72 hours.    No results found for: FOL, RBCF   No results for input(s): PH, PCO2, PO2 in the last 72 hours. No results for input(s): CPK, CKNDX, TROIQ in the last 72 hours.     No lab exists for component: CPKMB  Lab Results   Component Value Date/Time    Cholesterol, total 83 11/16/2019 03:12 AM    HDL Cholesterol 9 11/16/2019 03:12 AM    LDL, calculated 44.2 11/16/2019 03:12 AM    Triglyceride 149 11/16/2019 03:12 AM    CHOL/HDL Ratio 9.2 (H) 11/16/2019 03:12 AM     No results found for: GLUCPOC  Lab Results   Component Value Date/Time    Color DARK YELLOW 11/12/2019 03:40 PM    Appearance CLOUDY (A) 11/12/2019 03:40 PM    Specific gravity 1.023 11/12/2019 03:40 PM    pH (UA) 6.5 11/12/2019 03:40 PM    Protein 100 (A) 11/12/2019 03:40 PM    Glucose 100 (A) 11/12/2019 03:40 PM    Ketone TRACE (A) 11/12/2019 03:40 PM    Urobilinogen 4.0 (H) 11/12/2019 03:40 PM    Nitrites NEGATIVE  11/12/2019 03:40 PM    Leukocyte Esterase NEGATIVE  11/12/2019 03:40 PM    Epithelial cells FEW 11/12/2019 03:40 PM    Bacteria NEGATIVE  11/12/2019 03:40 PM    WBC 0-4 11/12/2019 03:40 PM    RBC 0-5 11/12/2019 03:40 PM         Medications Reviewed:     Current Facility-Administered Medications   Medication Dose Route Frequency    oxyCODONE-acetaminophen (PERCOCET) 5-325 mg per tablet 1 Tab  1 Tab Oral Q4H PRN    lactobac ac& pc-s.therm-b.anim (XIOMARA Q/RISAQUAD)  1 Cap Oral DAILY    piperacillin-tazobactam (ZOSYN) 3.375 g in 0.9% sodium chloride (MBP/ADV) 100 mL  3.375 g IntraVENous Q8H    albuterol-ipratropium (DUO-NEB) 2.5 MG-0.5 MG/3 ML  3 mL Nebulization Q4H PRN    acetaminophen (TYLENOL) tablet 650 mg  650 mg Oral Q6H PRN    ondansetron (ZOFRAN) injection 4 mg  4 mg IntraVENous Q8H PRN    sodium chloride (NS) flush 5-40 mL  5-40 mL IntraVENous Q8H    sodium chloride (NS) flush 5-40 mL  5-40 mL IntraVENous PRN     ______________________________________________________________________  EXPECTED LENGTH OF STAY: 4d 19h  ACTUAL LENGTH OF STAY:          7 Tracy Szymanski MD     Patient's emergency contacts:  Extended Emergency Contact Information  Primary Emergency Contact: None, Given  Home Phone: [phone]  Relation: Other Non-relative  Secondary Emergency Contact: Gianni Elizabeth  Mobile Phone: [phone]  Relation: Brother

## 2019-11-19 NOTE — PROGRESS NOTES
Bedside shift change report given to BRENNA Lieberman (oncoming nurse) by Bonnie Mckeon RN (offgoing nurse). Report included the following information SBAR and Kardex.

## 2019-11-19 NOTE — PROGRESS NOTES
Rounded on [facility] patients and provided Anointing of the Sick at request of patient    Fr. Jaqui Hannon

## 2019-11-19 NOTE — PROGRESS NOTES
Bedside shift change report given to Gio Orozco (oncoming nurse) by Kenny Mercado RN (offgoing nurse). Report included the following information SBAR and Kardex.

## 2019-11-19 NOTE — PROGRESS NOTES
Plans of care discussed in IDR. CM notified pt will need PICC line and long-term abx. CM to get [facility] for infusion company/ agency. CM awaiting MD abx orders. CM will continue to follow.      Senthil Tamayo RN, BSN  Care Management Department

## 2019-11-19 NOTE — PROGRESS NOTES
Bedside shift change report given to Bonnie Cowan RN (oncoming nurse) by Salvatore Rivera RN (offgoing nurse). Report included the following information SBAR, Kardex, Intake/Output and MAR.

## 2019-11-19 NOTE — PROGRESS NOTES
General Surgery Daily Progress Note    Admit Date: 2019  Post-Operative Day: * No surgery found * from * No surgery found *     Subjective:     Last 24 hrs: Pt is hungry; no complaints. Results of CT noted       Objective:     Blood pressure 132/86, pulse 82, temperature 98.1 °F (36.7 °C), resp. rate 16, height 5' 11\" (1.803 m), weight 227 lb 12.8 oz (103.3 kg), SpO2 97 %. Temp (24hrs), Av.5 °F (36.9 °C), Min:98.1 °F (36.7 °C), Max:99.1 °F (37.3 °C)      _____________________  Physical Exam:     Alert and Oriented, x3, in no acute distress. Cardiovascular: RRR, no peripheral edema  Abdomen: soft, nl BS, NT      Assessment:   Principal Problem:    Sepsis ([facility] (2019)    Active Problems:    Hyponatremia (2019)      Liver abscess (2019)      Gram-neg septicemia ([facility] (2019)      Diverticulitis of large intestine without bleeding (2019)            Plan:     Cont abx - change to po soon  Cont diet  OOB    Data Review:    Recent Labs     19  0338 19  0232   WBC 10.6 13.3* 13.9*   HGB 11.9* 12.3 13.2   HCT 36.5* 37.7 40.7    259 212     Recent Labs     19  0214 19  0338 19  0232   * 131* 133*   K 3.9 4.0 3.5    101 100   CO2 23 25 30   * 111* 128*   BUN 8 7 9   CREA 0.92 0.79 0.89   CA 8.0* 8.0* 7.7*     No results for input(s): AML, LPSE in the last 72 hours.         ______________________  Medications:    Current Facility-Administered Medications   Medication Dose Route Frequency    oxyCODONE-acetaminophen (PERCOCET) 5-325 mg per tablet 1 Tab  1 Tab Oral Q4H PRN    lactobac ac& pc-s.therm-b.anim (XIOMARA Q/RISAQUAD)  1 Cap Oral DAILY    piperacillin-tazobactam (ZOSYN) 3.375 g in 0.9% sodium chloride (MBP/ADV) 100 mL  3.375 g IntraVENous Q8H    albuterol-ipratropium (DUO-NEB) 2.5 MG-0.5 MG/3 ML  3 mL Nebulization Q4H PRN    acetaminophen (TYLENOL) tablet 650 mg  650 mg Oral Q6H PRN    ondansetron (ZOFRAN) injection 4 mg  4 mg IntraVENous Q8H PRN    sodium chloride (NS) flush 5-40 mL  5-40 mL IntraVENous Q8H    sodium chloride (NS) flush 5-40 mL  5-40 mL IntraVENous PRN       Kerri Burger NP  11/19/2019

## 2019-11-19 NOTE — PROGRESS NOTES
Infectious DiseaseProgress Note      HPI:       Mr Gus Velazquez seen earlier  Was awaiting CT   Was wanting food   Denied fever, chills, nausea, vomiting, abd pain, diarrhea   Says stool is soft   Denied dental or neck pain       Current Facility-Administered Medications:     oxyCODONE-acetaminophen (PERCOCET) 5-325 mg per tablet 1 Tab, 1 Tab, Oral, Q4H PRN, Samuel Ruiz MD, 1 Tab at 11/19/19 1122    lactobac ac& pc-s.therm-b.anim (XIOMARA Q/RISAQUAD), 1 Cap, Oral, DAILY, Samuel Ruiz MD, 1 Cap at 11/19/19 0943    piperacillin-tazobactam (ZOSYN) 3.375 g in 0.9% sodium chloride (MBP/ADV) 100 mL, 3.375 g, IntraVENous, Q8H, Nohemy Mesa MD, Last Rate: 25 mL/hr at 11/19/19 1051, 3.375 g at 11/19/19 1051    albuterol-ipratropium (DUO-NEB) 2.5 MG-0.5 MG/3 ML, 3 mL, Nebulization, Q4H PRN, Samuel Ruiz MD    acetaminophen (TYLENOL) tablet 650 mg, 650 mg, Oral, Q6H PRN, Samuel Ruiz MD, 650 mg at 11/15/19 1540    ondansetron (ZOFRAN) injection 4 mg, 4 mg, IntraVENous, Q8H PRN, Son Thapa MD    sodium chloride (NS) flush 5-40 mL, 5-40 mL, IntraVENous, Q8H, Son Thapa MD, 10 mL at 11/19/19 1414    sodium chloride (NS) flush 5-40 mL, 5-40 mL, IntraVENous, PRN, Son Thapa MD          Physical Exam:    Vitals:   Patient Vitals for the past 24 hrs:   Temp Pulse Resp BP SpO2   11/19/19 1514 98.5 °F (36.9 °C) 83 16 135/86 97 %   11/19/19 1143 98.1 °F (36.7 °C) 82 16 132/86 97 %   11/19/19 0824 99.1 °F (37.3 °C) 81 15 122/75 96 %   11/18/19 2341 98.6 °F (37 °C) 80 18 115/73 95 %   11/18/19 1604 98.2 °F (36.8 °C) (!) 101 18 120/77 95 %     · GEN: NAD,  · HEENT: Normocephalic, atraumatic, , no scleral icterus,  no cervical lymphadenopathy, no sinus tenderness, no thrush, missing tooth in the front, non tender to palpation of neck bilaterally   · CV: S1, S2 heard regularly,  · Lungs: Clear to auscultation bilaterally  · Abdomen: soft, non distended, non tender, no rash   · Extremities: no edema  · Skin: no rash        Labs:   Recent Results (from the past 24 hour(s))   CBC WITH AUTOMATED DIFF    Collection Time: 11/19/19  2:14 AM   Result Value Ref Range    WBC 10.6 4.1 - 11.1 K/uL    RBC 4.01 (L) 4.10 - 5.70 M/uL    HGB 11.9 (L) 12.1 - 17.0 g/dL    HCT 36.5 (L) 36.6 - 50.3 %    MCV 91.0 80.0 - 99.0 FL    MCH 29.7 26.0 - 34.0 PG    MCHC 32.6 30.0 - 36.5 g/dL    RDW 13.4 11.5 - 14.5 %    PLATELET 814 974 - 252 K/uL    MPV 10.2 8.9 - 12.9 FL    NRBC 0.0 0  WBC    ABSOLUTE NRBC 0.00 0.00 - 0.01 K/uL    NEUTROPHILS 74 32 - 75 %    BAND NEUTROPHILS 4 0 - 6 %    LYMPHOCYTES 12 12 - 49 %    MONOCYTES 7 5 - 13 %    EOSINOPHILS 0 0 - 7 %    BASOPHILS 0 0 - 1 %    PROMYELOCYTES 3 (H) 0 %    IMMATURE GRANULOCYTES 0 %    ABS. NEUTROPHILS 8.3 (H) 1.8 - 8.0 K/UL    ABS. LYMPHOCYTES 1.3 0.8 - 3.5 K/UL    ABS. MONOCYTES 0.7 0.0 - 1.0 K/UL    ABS. EOSINOPHILS 0.0 0.0 - 0.4 K/UL    ABS. BASOPHILS 0.0 0.0 - 0.1 K/UL    ABS. IMM.  GRANS. 0.0 K/UL    DF MANUAL      PLATELET COMMENTS Large Platelets      RBC COMMENTS MACROCYTOSIS  1+        RBC COMMENTS POLYCHROMASIA  1+       METABOLIC PANEL, BASIC    Collection Time: 11/19/19  2:14 AM   Result Value Ref Range    Sodium 132 (L) 136 - 145 mmol/L    Potassium 3.9 3.5 - 5.1 mmol/L    Chloride 101 97 - 108 mmol/L    CO2 23 21 - 32 mmol/L    Anion gap 8 5 - 15 mmol/L    Glucose 175 (H) 65 - 100 mg/dL    BUN 8 6 - 20 MG/DL    Creatinine 0.92 0.70 - 1.30 MG/DL    BUN/Creatinine ratio 9 (L) 12 - 20      GFR est AA >60 >60 ml/min/1.73m2    GFR est non-AA >60 >60 ml/min/1.73m2    Calcium 8.0 (L) 8.5 - 10.1 MG/DL   DUPLEX CAROTID BILATERAL    Collection Time: 11/19/19  9:15 AM   Result Value Ref Range    Right cca dist sys 77.7 cm/s    Right CCA dist gallardo 19.0 cm/s    Right CCA prox sys 94.7 cm/s    Right CCA prox gallardo 17.7 cm/s    Right eca sys 105.1 cm/s    RIGHT EXTERNAL CAROTID ARTERY D 12.52 cm/s    Right ICA dist sys 65.1 cm/s    Right ICA dist gallardo 27.6 cm/s    Right ICA mid sys 58.1 cm/s    Right ICA mid gallardo 18.0 cm/s    Right ICA prox sys 81.7 cm/s    Right ICA prox gallardo 20.4 cm/s    Right vertebral sys 46.0 cm/s    RIGHT VERTEBRAL ARTERY D 14.66 cm/s    Right ICA/CCA sys 1.1     Left CCA dist sys 77.7 cm/s    Left CCA dist gallardo 19.4 cm/s    Left CCA prox sys 78.8 cm/s    Left CCA prox gallardo 18.3 cm/s    Left ECA sys 65.5 cm/s    LEFT EXTERNAL CAROTID ARTERY D 6.03 cm/s    Left ICA dist sys 66.7 cm/s    Left ICA dist gallardo 22.7 cm/s    Left ICA mid sys 71.1 cm/s    Left ICA mid gallardo 22.7 cm/s    Left ICA prox sys 78.8 cm/s    Left ICA prox gallardo 23.8 cm/s    Left vertebral sys 37.4 cm/s    LEFT VERTEBRAL ARTERY D 12.53 cm/s    Left ICA/CCA sys 1.01        Microbiology Data:     Blood 11/17/19  Component Value Ref Range & Units Status   Special Requests: NO SPECIAL REQUESTS    Preliminary   Culture result: NO GROWTH 2 DAYS    Preliminary   Result History     CULTURE, BLOOD, PAIRED on 11/19/2019                  Blood: 11/12/19  Component Value Ref Range & Units Status   Special Requests: NO SPECIAL REQUESTS    Final   Culture result: Abnormal     Final   FUSOBACTERIUM SPECIES BETA LACTAMASE NEGATIVE GROWING IN 2 OF 4 BOTTLES DRAWN (SITES = L AC AND R AC)   Culture result: Abnormal     Final   PRELIMINARY REPORT OF ANAEROBIC GRAM NEGATIVE RODS GROWING IN 2 OF 4 BOTTLES DRAWN CALLED TO AND READ BACK BY Zainab Colby RN ON 11/17/19 AT 0647 TA. Culture result:    Final   REMAINING BOTTLE(S) HAS/HAVE NO GROWTH IN 5 DAYS    Result History                 Abscess 11/13/19       Component Value Ref Range & Units Status   Special Requests: LIVER    Final   GRAM STAIN 3+ WBCS SEEN    Final   GRAM STAIN NO ORGANISMS SEEN    Final   Culture result:   Final   NO GROWTH IN 2 DAYS, AEROBICALLY    Culture result: Abnormal     Final   MODERATE ANAEROBIC GRAM NEGATIVE RODS ISOLATED . ... PLEASE REFER TO Chico Siegel [id number], FOR IDENTIFICATION    Result History     Specimen Information: Abscess     Component Value Ref Range & Units Status   Special Requests: LIVER   Final   Culture result: Abnormal     Final   MODERATE FUSOBACTERIUM SPECIES BETA LACTAMASE NEGATIVE    Result History         Component Value Ref Range & Units Status   Special Requests: LIVER    Final   GRAM STAIN 2+ WBCS SEEN    Final   GRAM STAIN NO ORGANISMS SEEN    Final   Culture result:   Final   NO GROWTH 2 DAYS, AEROBICALLY    Culture result: Abnormal     Final   MODERATE ANAEROBIC GRAM NEGATIVE RODS ISOLATED . .. PLEASE REFER TO Radha Khan [id number] FOR IDENTIFICATION    Result History         Pathology Results:       No definite organisms identified on routine stains. Recommend correlation with pending microbiology testing. Amoebic infections may also result in liver abscess. Depending on clinical information, consider serologic testing Addendum Electronically Signed Out By Jeane Moritz MD   [facility]/11/15/2019         Specimen Source   1: Liver, Core biopsy with touch intepretation:   CYTOLOGIC INTERPRETATION:   1. Liver, Core biopsy with touch intepretation:   Liver parenchyma with acute and chronic inflammation and macrovesicular steatosis   See comment   General Categorization   No cells diagnostic for malignancy   Specimen Adequacy   Satisfactory for evaluation     Imaging:   US [facility] 11/12/19  COMPARISON:  CT scan earlier today     FINDINGS: Limited right upper quadrant ultrasound was performed. The liver is  diffusely increased in echogenicity and enlarged. Within the left lobe of the  liver, there is an ill-defined, mixed echogenicity mass, measuring 5.6 x 5.5 x  3.6 cm. . The common bile duct is normal measuring 3 mm in diameter. The  gallbladder is normal. The right kidney measures 11.8 cm in length.     IMPRESSION  IMPRESSION:      1. Enlarged, echogenic liver, compatible with diffuse fatty infiltration.   2. Complex mass in the left lobe of the liver measuring 5.6 cm.  3. No biliary ductal dilatation.     Further evaluation with liver mass protocol MRI or CT is recommended. CT ABD 11/13/19  FINDINGS:      LIVER: There is a 6.5 cm multiloculated low density cystic lesion in segment 2/3  of the left hepatic lobe concerning for abscess versus cystic neoplasm. Small  nonspecific low-density focus in the right hepatic lobe measuring 3.4 x 1.1 cm  (series 6, image 40). No other focal liver abnormality. No biliary ductal  dilatation   GALLBLADDER: Probable small stones but otherwise unremarkable in appearance. SPLEEN: Borderline splenomegaly  PANCREAS: No mass or ductal dilatation. ADRENALS: Unremarkable. KIDNEYS/URETERS: Symmetric nephrograms without evidence for focal mass. No  hydronephrosis. PERITONEUM: Borderline enlarged para-aortic retroperitoneal lymph nodes. No  other evidence for abdominal lymphadenopathy. No ascites. COLON: Inflammatory changes in the sigmoid colon are stable, likely related to  diverticulitis. Stable probable contained microperforation along the posterior  wall of the sigmoid colon (image 147). No free intraperitoneal gas. No evidence  for pericolonic abscess. APPENDIX: Unremarkable. SMALL BOWEL: No dilatation or wall thickening. STOMACH: Unremarkable. PELVIS: No pelvic lymphadenopathy. Trace pericolonic free fluid. The bladder is  unremarkable. BONES: No destructive bone lesion. Mild to moderate lumbosacral spondylosis. VISUALIZED THORAX: Bibasilar subsegmental atelectasis  ADDITIONAL COMMENTS: N/A     IMPRESSION  IMPRESSION:     6 cm multiloculated low density/cystic lesion in the left hepatic lobe may  represent abscess in the appropriate clinical picture. Cystic neoplasm is  another possibility. There is a smaller 3 cm nonspecific low-density focus in  the right hepatic lobe.     Stable inflammatory changes involving the sigmoid colon. CT 11/12/19  COMPARISON: CT chest 11/12/2019     CONTRAST:  None.     TECHNIQUE:   Thin axial images were obtained through the abdomen and pelvis.  Coronal and  sagittal reconstructions were generated. Oral contrast was not administered. CT  dose reduction was achieved through use of a standardized protocol tailored for  this examination and automatic exposure control for dose modulation.      The absence of intravenous contrast material reduces the sensitivity for  evaluation of the solid parenchymal organs of the abdomen.      FINDINGS:   LUNG BASES: There is mild bibasilar atelectasis. INCIDENTALLY IMAGED HEART AND MEDIASTINUM: Unremarkable. LIVER: Diffuse fatty infiltration of the liver. GALLBLADDER: Unremarkable. SPLEEN: No mass. PANCREAS: No mass or ductal dilatation. ADRENALS: Unremarkable. KIDNEYS/URETERS: No mass, calculus, or hydronephrosis. STOMACH: Unremarkable. SMALL BOWEL: No bowel obstruction or perforation  COLON: Extensive diverticulosis of the colon. There is bowel wall thickening of  the sigmoid colon with extensive diverticula as well as pericolonic  inflammation, but no focal fluid collections. There is also a small amount of  gas adjacent to the affected loop of sigmoid colon, which may be extraluminal.  There is no free intraperitoneal air. APPENDIX: Unremarkable. PERITONEUM: No ascites  RETROPERITONEUM: No lymphadenopathy or aortic aneurysm. REPRODUCTIVE ORGANS: The prostate is noted. URINARY BLADDER: No mass or calculus. BONES: No destructive bone lesion. ADDITIONAL COMMENTS: There is degenerative disc disease which is most advanced  at L4-L5.     IMPRESSION  IMPRESSION:     1. Concentric bowel wall thickening of a 7 cm segment of sigmoid colon, where  there are extensive diverticula and mild pericolonic inflammation. This is  consistent with colitis, which may be due to diverticulitis. Notably, there is a  small amount of extraluminal gas suspected, immediately adjacent to the affected  segment of sigmoid colon. This may represent a localized perforation. 2. No evidence of abscess or bowel obstruction.   3. Diffuse fatty infiltration of the liver.    FINDINGS:   The visualized thyroid gland is unremarkable. The aorta and main pulmonary  artery are normal in caliber. There is coronary artery atherosclerosis. The  heart size is normal.  There is no pericardial effusion.       There are no enlarged axillary, mediastinal, or hilar lymph nodes.      There are minimal subpleural reticular opacities in the lower lungs. There is  minimal left basilar scarring or atelectasis. There is no suspicious lung  nodule, mass, or consolidation.     In the limited images of the upper abdomen, the partially imaged solid organs  are unremarkable. Degenerative changes in the spine. There is no aggressive bony  lesion.     IMPRESSION  IMPRESSION:   Minimal interstitial changes in the lower lungs with minimal left basilar  scarring or atelectasis. No evidence for atypical infection or other acute  abnormality. TTE  Left Ventricle Normal cavity size, wall thickness, systolic function (ejection fraction normal) and diastolic function. The muscle mass is normal. The cavity shape is normal. The estimated ejection fraction is 56 - 60%. No regional wall motion abnormality noted. End-systolic volume is normal. Normal left ventricular strain. Normal left ventricular diastolic pressure. End-diastolic volume is normal.   Wall Scoring The left ventricular wall motion is normal.            Left Atrium Normal cavity size. No atrial septal defect present. Right Ventricle Normal cavity size, wall thickness and global systolic function. Right Atrium Normal cavity size. Interatrial Septum No atrial septal defect present. No interatrial septal aneurysm present. .   Aortic Valve Normal valve structure, trileaflet valve structure, no stenosis and no regurgitation. Mitral Valve Normal valve structure, no stenosis and no regurgitation. Tricuspid Valve Normal valve structure, no stenosis and no regurgitation. Pulmonic Valve Pulmonic valve not well visualized.  Normal valve structure, no stenosis and no regurgitation. Aorta Normal aortic root, ascending aortic, and aortic arch. IVC/Hepatic Veins Mildly elevated central venous pressure (5-10 mmHg); IVC diameter is less than 21 mm and collapses less than 50% with respiration. CT ABD 11/18/19  FINDINGS:   LUNG BASES: Small bilateral pleural effusions are present increase in interval.  Bibasilar volume loss is present. INCIDENTALLY IMAGED HEART AND MEDIASTINUM: Unremarkable. LIVER: The left hepatic lobe segment 2, again noted is a lobulated hypodense  lesion which demonstrates possible mild peripheral enhancement. Overall lesion  size is not significantly changed although there may be some increased edema  around the lesion. GALLBLADDER: Unremarkable. SPLEEN: No mass. PANCREAS: No mass or ductal dilatation. ADRENALS: Unremarkable. KIDNEYS: No mass, calculus, or hydronephrosis. STOMACH: Unremarkable. SMALL BOWEL: No dilatation or wall thickening. COLON: Colonic wall thickening and mild pericolonic inflammation is noted in the  sigmoid colon compatible with diverticulitis. No evidence of perforation or  abscess formation. APPENDIX: Unremarkable. PERITONEUM: No significant free fluid is identified. No lymphadenopathy in the  peritoneum. RETROPERITONEUM: Mildly prominent retroperitoneal lymph nodes measuring up to 11  mm in size not significantly changed. REPRODUCTIVE ORGANS: Unremarkable  URINARY BLADDER: Nondistended and mildly thickened  BONES: No destructive bone lesion. ADDITIONAL COMMENTS: N/A     IMPRESSION  IMPRESSION:  1. Lobulated hypodense liver lesion without significant change in size. Possible increased surrounding edema. . This is not entirely specific however  previous biopsy demonstrated probable infection.     2. No evidence of diverticular abscess or perforation. Persistent colonic wall  thickening and mild pericolonic inflammation of the sigmoid colon.  No  significant free fluid.     3.  Other incidental findings as described      Carotid US  Right Carotid     There is no stenosis in the right CCA. There is mild stenosis in the right ICA (<50%). The right ICA has mixed density plaque. There is no stenosis in the right ECA. The right vertebral is antegrade. Left Carotid     There is no stenosis in the left CCA. There is mild stenosis in the left ICA (<50%). The left ICA has mixed density plaque. There is no stenosis in the left ECA. The left vertebral is antegrade. Procedures:     26/77/98  Uncomplicated CT-guided left lobe liver mass biopsy and aspiration      Assessment / Plan:      Mr Aurora Quinonez is a 57-year-old gentleman reports no significant past medical history admitted on 11/12/2019 with abdominal pain and diarrhea. He reports having a [birth year] on [birth year] and made a large pot of spaghetti. That evening he noticed he had explosive diarrhea. Started experiencing abdominal pain and started feeling very sick and therefore presented to the hospital.  Found to have diverticulitis with microperforation and work-up also showed liver abscess/mass . He had this drained by CT-guided biopsy/drainage on 11/13/2019. He says he feels better but still very tired and recuperating from his recent illness. Says diarrhea is resolved . Denies any fevers chills or night sweats ongoing . Has started eating. Overall improved . Tolerating antibiotics without any issues at this time. From chart review, his T-max is 99.9. He has leukocytosis that is uptrending to 18.2. His renal function is normal.  His LFTs are elevated but trending down.       1) Fusobacterium bacteremia   Usually seen in oral/dental, deep neck infections  He denied any dental or neck pain  Seeding suspected from liver abscess   TTE noted   May need  LATANYA given  slow growing indolent infection risk    Noted Carotid US   Await repeat cx 11/17 so far negative   On Zosyn IV       2) Liver abscess   Status post drainage on 11/13/2019  Cultures pending but so far GNR anaerobic . On Zosyn   Repeat CT with \" Lobulated hypodense liver lesion without significant change in size. Possible increased surrounding edema\" Surgery following and ? If amenable to drainage   Usually liver abscesses are treated until they have resolved for 4 to 6 weeks with clinical monitoring as well as radiographic monitoring to assess response to treatment    3) diverticulitis with microperforation  Evaluated by surgery  On IV Zosyn    4) DVT PX               Thank for the opportunity to participate in the care of this patient. Please contact with questions or concerns.      Nely Alvarado DO  3:30 PM

## 2019-11-20 ENCOUNTER — ANESTHESIA EVENT (OUTPATIENT)
Dept: CARDIAC CATH/INVASIVE PROCEDURES | Age: 59
DRG: 720 | End: 2019-11-20
Payer: MEDICAID

## 2019-11-20 ENCOUNTER — APPOINTMENT (OUTPATIENT)
Dept: CARDIAC CATH/INVASIVE PROCEDURES | Age: 59
DRG: 720 | End: 2019-11-20
Attending: SPECIALIST
Payer: MEDICAID

## 2019-11-20 ENCOUNTER — ANESTHESIA (OUTPATIENT)
Dept: CARDIAC CATH/INVASIVE PROCEDURES | Age: 59
DRG: 720 | End: 2019-11-20
Payer: MEDICAID

## 2019-11-20 LAB
ANION GAP SERPL CALC-SCNC: 6 MMOL/L (ref 5–15)
BASOPHILS # BLD: 0.1 K/UL (ref 0–0.1)
BASOPHILS NFR BLD: 1 % (ref 0–1)
BUN SERPL-MCNC: 7 MG/DL (ref 6–20)
BUN/CREAT SERPL: 9 (ref 12–20)
CALCIUM SERPL-MCNC: 8.1 MG/DL (ref 8.5–10.1)
CHLORIDE SERPL-SCNC: 103 MMOL/L (ref 97–108)
CO2 SERPL-SCNC: 24 MMOL/L (ref 21–32)
CREAT SERPL-MCNC: 0.8 MG/DL (ref 0.7–1.3)
DIFFERENTIAL METHOD BLD: ABNORMAL
EOSINOPHIL # BLD: 0 K/UL (ref 0–0.4)
EOSINOPHIL NFR BLD: 0 % (ref 0–7)
ERYTHROCYTE [DISTWIDTH] IN BLOOD BY AUTOMATED COUNT: 13.4 % (ref 11.5–14.5)
GLUCOSE SERPL-MCNC: 92 MG/DL (ref 65–100)
HCT VFR BLD AUTO: 38 % (ref 36.6–50.3)
HGB BLD-MCNC: 12.3 G/DL (ref 12.1–17)
IMM GRANULOCYTES # BLD AUTO: 0 K/UL
IMM GRANULOCYTES NFR BLD AUTO: 0 %
LYMPHOCYTES # BLD: 1 K/UL (ref 0.8–3.5)
LYMPHOCYTES NFR BLD: 10 % (ref 12–49)
MCH RBC QN AUTO: 29.7 PG (ref 26–34)
MCHC RBC AUTO-ENTMCNC: 32.4 G/DL (ref 30–36.5)
MCV RBC AUTO: 91.8 FL (ref 80–99)
MONOCYTES # BLD: 0.4 K/UL (ref 0–1)
MONOCYTES NFR BLD: 4 % (ref 5–13)
NEUTS BAND NFR BLD MANUAL: 3 % (ref 0–6)
NEUTS SEG # BLD: 8.8 K/UL (ref 1.8–8)
NEUTS SEG NFR BLD: 82 % (ref 32–75)
NRBC # BLD: 0 K/UL (ref 0–0.01)
NRBC BLD-RTO: 0 PER 100 WBC
PLATELET # BLD AUTO: 335 K/UL (ref 150–400)
PMV BLD AUTO: 10 FL (ref 8.9–12.9)
POTASSIUM SERPL-SCNC: 4.4 MMOL/L (ref 3.5–5.1)
RBC # BLD AUTO: 4.14 M/UL (ref 4.1–5.7)
RBC MORPH BLD: ABNORMAL
RBC MORPH BLD: ABNORMAL
SODIUM SERPL-SCNC: 133 MMOL/L (ref 136–145)
WBC # BLD AUTO: 10.3 K/UL (ref 4.1–11.1)

## 2019-11-20 PROCEDURE — 74011250637 HC RX REV CODE- 250/637: Performed by: INTERNAL MEDICINE

## 2019-11-20 PROCEDURE — 76060000032 HC ANESTHESIA 0.5 TO 1 HR

## 2019-11-20 PROCEDURE — 74011250636 HC RX REV CODE- 250/636: Performed by: SURGERY

## 2019-11-20 PROCEDURE — 74011000258 HC RX REV CODE- 258: Performed by: SURGERY

## 2019-11-20 PROCEDURE — 93312 ECHO TRANSESOPHAGEAL: CPT

## 2019-11-20 PROCEDURE — 74011250636 HC RX REV CODE- 250/636: Performed by: NURSE ANESTHETIST, CERTIFIED REGISTERED

## 2019-11-20 PROCEDURE — 80048 BASIC METABOLIC PNL TOTAL CA: CPT

## 2019-11-20 PROCEDURE — 36415 COLL VENOUS BLD VENIPUNCTURE: CPT

## 2019-11-20 PROCEDURE — 85025 COMPLETE CBC W/AUTO DIFF WBC: CPT

## 2019-11-20 PROCEDURE — 65270000032 HC RM SEMIPRIVATE

## 2019-11-20 PROCEDURE — 74011000250 HC RX REV CODE- 250: Performed by: NURSE ANESTHETIST, CERTIFIED REGISTERED

## 2019-11-20 RX ORDER — SODIUM CHLORIDE 9 MG/ML
INJECTION, SOLUTION INTRAVENOUS
Status: DISCONTINUED | OUTPATIENT
Start: 2019-11-20 | End: 2019-11-20 | Stop reason: HOSPADM

## 2019-11-20 RX ORDER — GLYCOPYRROLATE 0.2 MG/ML
INJECTION INTRAMUSCULAR; INTRAVENOUS AS NEEDED
Status: DISCONTINUED | OUTPATIENT
Start: 2019-11-20 | End: 2019-11-20 | Stop reason: HOSPADM

## 2019-11-20 RX ORDER — LIDOCAINE HYDROCHLORIDE 20 MG/ML
INJECTION, SOLUTION EPIDURAL; INFILTRATION; INTRACAUDAL; PERINEURAL AS NEEDED
Status: DISCONTINUED | OUTPATIENT
Start: 2019-11-20 | End: 2019-11-20 | Stop reason: HOSPADM

## 2019-11-20 RX ORDER — PROPOFOL 10 MG/ML
INJECTION, EMULSION INTRAVENOUS AS NEEDED
Status: DISCONTINUED | OUTPATIENT
Start: 2019-11-20 | End: 2019-11-20 | Stop reason: HOSPADM

## 2019-11-20 RX ADMIN — PROPOFOL 50 MG: 10 INJECTION, EMULSION INTRAVENOUS at 12:55

## 2019-11-20 RX ADMIN — PROPOFOL 50 MG: 10 INJECTION, EMULSION INTRAVENOUS at 13:00

## 2019-11-20 RX ADMIN — OXYCODONE AND ACETAMINOPHEN 1 TABLET: 5; 325 TABLET ORAL at 14:41

## 2019-11-20 RX ADMIN — PROPOFOL 50 MG: 10 INJECTION, EMULSION INTRAVENOUS at 12:49

## 2019-11-20 RX ADMIN — PIPERACILLIN SODIUM AND TAZOBACTAM SODIUM 3.38 G: 3; .375 INJECTION, POWDER, LYOPHILIZED, FOR SOLUTION INTRAVENOUS at 18:16

## 2019-11-20 RX ADMIN — PROPOFOL 30 MG: 10 INJECTION, EMULSION INTRAVENOUS at 13:04

## 2019-11-20 RX ADMIN — Medication 10 ML: at 14:41

## 2019-11-20 RX ADMIN — PROPOFOL 50 MG: 10 INJECTION, EMULSION INTRAVENOUS at 12:42

## 2019-11-20 RX ADMIN — LIDOCAINE HYDROCHLORIDE 100 MG: 20 INJECTION, SOLUTION EPIDURAL; INFILTRATION; INTRACAUDAL; PERINEURAL at 12:36

## 2019-11-20 RX ADMIN — SODIUM CHLORIDE: 900 INJECTION, SOLUTION INTRAVENOUS at 12:30

## 2019-11-20 RX ADMIN — OXYCODONE AND ACETAMINOPHEN 1 TABLET: 5; 325 TABLET ORAL at 20:14

## 2019-11-20 RX ADMIN — PROPOFOL 50 MG: 10 INJECTION, EMULSION INTRAVENOUS at 12:39

## 2019-11-20 RX ADMIN — Medication 10 ML: at 01:00

## 2019-11-20 RX ADMIN — Medication 10 ML: at 06:00

## 2019-11-20 RX ADMIN — PROPOFOL 50 MG: 10 INJECTION, EMULSION INTRAVENOUS at 12:37

## 2019-11-20 RX ADMIN — PIPERACILLIN SODIUM AND TAZOBACTAM SODIUM 3.38 G: 3; .375 INJECTION, POWDER, LYOPHILIZED, FOR SOLUTION INTRAVENOUS at 09:01

## 2019-11-20 RX ADMIN — PROPOFOL 50 MG: 10 INJECTION, EMULSION INTRAVENOUS at 12:36

## 2019-11-20 RX ADMIN — OXYCODONE AND ACETAMINOPHEN 1 TABLET: 5; 325 TABLET ORAL at 08:58

## 2019-11-20 RX ADMIN — Medication 10 ML: at 18:16

## 2019-11-20 RX ADMIN — GLYCOPYRROLATE 0.2 MG: 0.2 INJECTION, SOLUTION INTRAMUSCULAR; INTRAVENOUS at 12:35

## 2019-11-20 RX ADMIN — PIPERACILLIN SODIUM AND TAZOBACTAM SODIUM 3.38 G: 3; .375 INJECTION, POWDER, LYOPHILIZED, FOR SOLUTION INTRAVENOUS at 01:00

## 2019-11-20 RX ADMIN — Medication 10 ML: at 09:02

## 2019-11-20 NOTE — PROGRESS NOTES
Fusobacterium bacteremia   Usually seen in oral/dental, deep neck infections  He denied any dental or neck pain  Seeding suspected from liver abscess   TTE noted   May need  MADI given  slow growing indolent infection risk      Asked to proceed with madi for above   discussed with patient risks and benefits of madi   risks including but not limited to esophageal perforation, bleeding , sore throat, respiratory depression requiring intubation, vocal chords damage understood and wishes to proceed

## 2019-11-20 NOTE — PROGRESS NOTES
Anesthesia name:  Carson Hair     Anesthesia is present for case. Refer to anesthesia log for vitals.

## 2019-11-20 NOTE — PROGRESS NOTES
TRANSFER - IN REPORT:    Verbal report received from Jessica(name) on Shyam Corcoran III  being received from Recovery Room(unit) for routine progression of care      Report consisted of patients Situation, Background, Assessment and   Recommendations(SBAR). Information from the following report(s) SBAR was reviewed with the receiving nurse. Opportunity for questions and clarification was provided. Assessment completed upon patients arrival to unit and care assumed.

## 2019-11-20 NOTE — PROGRESS NOTES
Cardiac Cath Lab Procedure Area Arrival Note:    Lisa Corcoran III arrived to Cardiac Cath Lab, Procedure Area. Patient identifiers verified with NAME and DATE OF BIRTH. Procedure verified with patient. Consent forms verified. Allergies verified. Patient informed of procedure and plan of care. Questions answered with review. Patient voiced understanding of procedure and plan of care. Patient on cardiac monitor, non-invasive blood pressure, SPO2 monitor. On room air and placed on O2 @ 2 lpm via NC .  IV of Normal saline on pump at 25 ml/hr. Patient status doing well with no problems Patient is A&Ox 4. Patient reports no pain. Patient medicated during procedure with orders obtained and verified by Dr. Macie Montana. Refer to patients Cardiac Cath Lab PROCEDURE REPORT for vital signs, assessment, status, and response during procedure, printed at end of case. Printed report on chart or scanned into chart.

## 2019-11-20 NOTE — PROCEDURES
Cardiac Catheterization Procedure Note   Patient: Deanna Bowers  MRN: [id number]  SSN: [id number]   YOB: 1960 Age: 62 y.o.   Sex: male    Date of Procedure: 11/20/2019   Pre-procedure Diagnosis: Infection  Post-procedure Diagnosis: right atrial mass  Procedure: LATANYA  :  Dr. 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

## 2019-11-20 NOTE — ANESTHESIA POSTPROCEDURE EVALUATION
Post-Anesthesia Evaluation and Assessment    Patient: Deana Fernando III MRN: [id number]  SSN: [id number]    YOB: 1960  Age: 62 y.o. Sex: male      I have evaluated the patient and they are stable and ready for discharge from the PACU. Cardiovascular Function/Vital Signs  Visit Vitals  /85 (BP 1 Location: Left arm, BP Patient Position: At rest)   Pulse (!) 103   Temp 37 °C (98.6 °F)   Resp 20   Ht 5' 11\" (1.803 m)   Wt 103.3 kg (227 lb 12.8 oz)   SpO2 99%   BMI 31.77 kg/m²       Patient is status post * No anesthesia type entered * anesthesia for * No procedures listed *. Nausea/Vomiting: None    Postoperative hydration reviewed and adequate. Pain:  Pain Scale 1: Numeric (0 - 10) (11/20/19 1330)  Pain Intensity 1: 0 (11/20/19 1330)   Managed    Neurological Status: At baseline    Mental Status, Level of Consciousness: Alert and  oriented to person, place, and time    Pulmonary Status:   O2 Device: Room air (11/20/19 1312)   Adequate oxygenation and airway patent    Complications related to anesthesia: None    Post-anesthesia assessment completed. No concerns    Signed By: Caryle Balk, MD     November 20, 2019              * No procedures listed *. MAC    <BSHSIANPOST>    Vitals Value Taken Time   /85 11/20/2019  1:46 PM   Temp     Pulse 86 11/20/2019  1:50 PM   Resp     SpO2 100 % 11/20/2019  1:50 PM   Vitals shown include unvalidated device data.

## 2019-11-20 NOTE — PROGRESS NOTES
Patient status post transesophageal echocardiogram.    There is a probable mass in the right atrium attached to the lateral wall of the nature which is uncertain. Given the clinical scenario I would recommend cardiac MRI to further characterize the mass. Dr. Jv Sosa to follow in the morning.

## 2019-11-20 NOTE — PROGRESS NOTES
NUTRITION  Pt seen for:     Reason for Rescreen: LOS          RECOMMENDATIONS:   Resume regular, low fiber diet after LATANYA    Interventions   - preferences noted  -dinner order taken       Information obtained from:   patient      Past Medical History:   Diagnosis Date    Knee pain      Pt admitted for sepsis. Liver abscess and perforated diverticulitis improved. Plans for LATANYA today with bacteremia this admit. Abx continue. Pt NPO for LATANYA today but eating well since diet advanced with no complaints or concerns. No wt loss noted with negative malnutrition screen on admit. Low fiber diet education provided earlier this admit.      Diet:  NPO, for LATANYA  Supplements: None  Intake: Excellent  Patient Vitals for the past 100 hrs:   % Diet Eaten   11/19/19 1600 100 %   11/19/19 0824 100 %   11/18/19 1714 100 %   11/18/19 0755 0 %   11/17/19 0841 100 %   11/16/19 0805 100 %     Weight Changes:   Stable  Wt Readings from Last 10 Encounters:   11/19/19 103.3 kg (227 lb 12.8 oz)   09/05/17 102.2 kg (225 lb 4 oz)   03/14/17 104.8 kg (231 lb)   12/21/16 103.4 kg (228 lb)   12/14/16 107.5 kg (237 lb)   11/14/15 104.3 kg (230 lb)     Nutrition-Related Concerns Identified:  None    PLAN:   - resume diet after LATANYA    Rescreen:  []            At Nutrition Risk - will follow          [x]            Rescreen per screening protocol    Reyne Spurling, RD [address] [place] , Pager #[phone] or [phone]

## 2019-11-20 NOTE — PROGRESS NOTES
Pt requesting pain meds but NPO for LATANYA today, attempting to find out what time pt is having procedure today as pain med is PO. Pt is complaining of lower abd pain and testicular pain that he states that is new as of this am prior to shift start. 1401 - spoke with Sonali Mchugh in the cath lab, procedure to be done around 12 noon today and confirmed that pt is allowed to have am po meds with sip of water.

## 2019-11-20 NOTE — PROGRESS NOTES
TRANSFER - IN REPORT:    Verbal report received from antonietta carranza on Shyam Corcoran III, Procedure transesophageal echcardiogram , from the Cardiac Cath lab, for routine progression of care. Report consisted of patients Situation, Background, Assessment and Recommendations(SBAR). Information from the following report(s) Procedure Summary was reviewed with the receiving clinician. Opportunity for questions and clarification was provided. Assessment completed upon patients arrival to [address] [place] [address] and care assumed. Cardiac Cath Lab Recovery Arrival Note:     Raul Corcoran III arrived to [facility] recovery area. Patient procedure= transesophageal echocardiogram  . Patient on cardiac monitor, non-invasive blood pressure, Patient status doing well without problems. Patient is A&Ox 4. Patient reports no issues. Procedure site without any bleeding and no hematoma hematoma.

## 2019-11-20 NOTE — PROGRESS NOTES
TRANSFER - OUT REPORT:    Verbal report given to Rodri Haley(name) on Shyam Corcoran III being transferred to Recovery Room(unit) for routine post - op       Report consisted of patient's Situation, Background, Assessment and   Recommendations(SBAR). Information from the following report(s) SBAR and Procedure Summary was reviewed with the receiving nurse. Opportunity for questions and clarification was provided.       Patient transported with:   O2 @ 4 liters

## 2019-11-20 NOTE — PROGRESS NOTES
Hospitalist Progress Note  Randall Hayes MD  Answering service: [phone] from in house phone        Date of Service:  2019  NAME:  Ethan Pedroza III  :  1960  MRN:  [id number]  PCP: Mian Godwin MD          Admission Summary:     Rose Marie Jenkins is a 62 y.o. male who presented with diarrhea, abdominal pain    Interval history / Subjective:   Patient seen for Follow up of CC; sepsis  Patient seen earlier before he went down for the LATANYA. Patient did not have any complaints. I have not been informed by cardiologist that LATANYA showed mass in the right atrium. 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 Carotid Doppler negative for thrombus  Repeat BC x 2 : NGTD  Liver biopsy and cultures:  anaerobic GNR, BL neg, final identification pending. Significantly elevated procalcitonin level at 47, repeat level is much improved to 16.4. Continue IV Zosyn, ID recommendations noted. Patient aware that he will require longer (4-6wks) of IV abx and until radiological resolution has been achieved. TTE neg for vegetations. CT abdomen pelvis on 1118 still with persistent be related and hypodense liver lesion without significant change with possible increased surrounding edema. No evidence of diverticular abscess or perforation  -LATANYA showed possible right atrial mass. Cardiology suggested cardiac MRI, ordered. Discussed with case management, patient needs transport to [facility] where they can do cardiac MRI.  - Lactic acidosis sec to sepsis  Resolved    Acute respiratory insufficiency, volume overload   Resolved, off supplemental oxygen.      - Hyponatremia: sec to sepsis and dehydration  improving  Repeat BMP daily    -Recurrent hypokalemia: resolved, replete prn    #elevated troponin  Likely due to sepsis/tachycardia/demand supply mismatch  Cards consult noted, no ACS as per cardiology. Echocardiogram noted, normal LVEF, cardio no follow-up with outpatient. No active CP, hx reported hx of CAD    -Morbid obesity: Weight loss recommended    Code status: Full Code    DVT prophylaxis: SCDs    Care Plan discussed with: Patient/Family, Nurse and Consultant Gen Surg    Disposition: TBD    Hospital Problems  Date Reviewed: 2019          Codes Class Noted POA    * (Principal) Sepsis ([facility] ICD-10-CM: A41.9  ICD-9-CM: 038.9, 995.91  2019 Yes        Liver abscess ICD-10-CM: K75.0  ICD-9-CM: 572.0  2019 Yes        Gram-neg septicemia ([facility] ICD-10-CM: A41.50  ICD-9-CM: 038.40  2019 Yes        Diverticulitis of large intestine without bleeding ICD-10-CM: K57.32  ICD-9-CM: 562.11  2019 Yes        Hyponatremia ICD-10-CM: E87.1  ICD-9-CM: 276.1  2019 Yes                Review of Systems:   A comprehensive review of systems was negative. Physical Examination:     General appearance: Alert, awake, appears older than his stated age, comfortable  Eyes: conjunctivae/corneas clear. PERRL. Lungs: clear to auscultation bilaterally, no rales, wheezing or rhonchi  Heart: sinus tachycardia, no murmurs, no gallops  Abdomen: soft, NT, NG, NR. Mild right upper quadrant tenderness without rebound or guarding  Neurologic: AAOx3, able to move all extremities    Vital Signs:    Last 24hrs VS reviewed since prior progress note.  Most recent are:    Visit Vitals  /85 (BP 1 Location: Left arm, BP Patient Position: At rest)   Pulse 87   Temp 97.2 °F (36.2 °C)   Resp 14   Ht 5' 11\" (1.803 m)   Wt 103.3 kg (227 lb 12.8 oz)   SpO2 97%   BMI 31.77 kg/m²         Intake/Output Summary (Last 24 hours) at 2019 1751  Last data filed at 2019 1330  Gross per 24 hour   Intake 1500 ml   Output 2770 ml   Net -1270 ml        Tmax:  Temp (24hrs), Av °F (36.7 °C), Min:97.2 °F (36.2 °C), Max:98.6 °F (37 °C)      Data Review:   Data reviewed by myself:  Xr Chest Pa Lat    Result Date: 11/12/2019  INDICATION:  febrile illness Exam: Chest 2 views. Comparison: 3/22/2017. Findings: Cardiomediastinal silhouette is normal. Pulmonary vasculature is slightly prominent and increased since the prior study. Mild interstitial prominence predominantly at the lung bases with minimal left basilar atelectasis. No pneumothorax or focal consolidation. . Chronic right-sided rib fractures. Impression: 1. Mild prominence of the central pulmonary vasculature with minimal interstitial prominence at the lung bases. Ashly Lozano developing mild interstitial edema as opposed to atypical infection     Ct Chest Wo Cont    Result Date: 11/12/2019  EXAM:  CT thorax without contrast INDICATION:  Cough. COMPARISON: Chest radiographs 12/49/3616 TECHNIQUE: Helical CT of the chest is performed without intravenous contrast. Coronal and sagittal reformatted images are obtained. CT dose reduction was achieved through use of a standardized protocol tailored for this examination and automatic exposure control for dose modulation. Adaptive statistical iterative reconstruction (ASIR) was utilized. FINDINGS: The visualized thyroid gland is unremarkable. The aorta and main pulmonary artery are normal in caliber. There is coronary artery atherosclerosis. The heart size is normal.  There is no pericardial effusion. There are no enlarged axillary, mediastinal, or hilar lymph nodes. There are minimal subpleural reticular opacities in the lower lungs. There is minimal left basilar scarring or atelectasis. There is no suspicious lung nodule, mass, or consolidation. In the limited images of the upper abdomen, the partially imaged solid organs are unremarkable. Degenerative changes in the spine. There is no aggressive bony lesion.      IMPRESSION: Minimal interstitial changes in the lower lungs with minimal left basilar scarring or atelectasis. No evidence for atypical infection or other acute abnormality. Ct Abd Pelv Wo Cont    Result Date: 11/12/2019  EXAM: CT ABD PELV WO CONT INDICATION: abd pain COMPARISON: CT chest 11/12/2019 CONTRAST:  None. TECHNIQUE: Thin axial images were obtained through the abdomen and pelvis. Coronal and sagittal reconstructions were generated. Oral contrast was not administered. CT dose reduction was achieved through use of a standardized protocol tailored for this examination and automatic exposure control for dose modulation. The absence of intravenous contrast material reduces the sensitivity for evaluation of the solid parenchymal organs of the abdomen. FINDINGS: LUNG BASES: There is mild bibasilar atelectasis. INCIDENTALLY IMAGED HEART AND MEDIASTINUM: Unremarkable. LIVER: Diffuse fatty infiltration of the liver. GALLBLADDER: Unremarkable. SPLEEN: No mass. PANCREAS: No mass or ductal dilatation. ADRENALS: Unremarkable. KIDNEYS/URETERS: No mass, calculus, or hydronephrosis. STOMACH: Unremarkable. SMALL BOWEL: No bowel obstruction or perforation COLON: Extensive diverticulosis of the colon. There is bowel wall thickening of the sigmoid colon with extensive diverticula as well as pericolonic inflammation, but no focal fluid collections. There is also a small amount of gas adjacent to the affected loop of sigmoid colon, which may be extraluminal. There is no free intraperitoneal air. APPENDIX: Unremarkable. PERITONEUM: No ascites RETROPERITONEUM: No lymphadenopathy or aortic aneurysm. REPRODUCTIVE ORGANS: The prostate is noted. URINARY BLADDER: No mass or calculus. BONES: No destructive bone lesion. ADDITIONAL COMMENTS: There is degenerative disc disease which is most advanced at L4-L5. IMPRESSION: 1. Concentric bowel wall thickening of a 7 cm segment of sigmoid colon, where there are extensive diverticula and mild pericolonic inflammation.  This is consistent with colitis, which may be due to diverticulitis. Notably, there is a small amount of extraluminal gas suspected, immediately adjacent to the affected segment of sigmoid colon. This may represent a localized perforation. 2. No evidence of abscess or bowel obstruction. 3. Diffuse fatty infiltration of the liver. [address] [facility]    Result Date: 11/12/2019  INDICATION:  Elevated LFTs COMPARISON:  CT scan earlier today FINDINGS: Limited right upper quadrant ultrasound was performed. The liver is diffusely increased in echogenicity and enlarged. Within the left lobe of the liver, there is an ill-defined, mixed echogenicity mass, measuring 5.6 x 5.5 x 3.6 cm. . The common bile duct is normal measuring 3 mm in diameter. The gallbladder is normal. The right kidney measures 11.8 cm in length. IMPRESSION: 1. Enlarged, echogenic liver, compatible with diffuse fatty infiltration. 2. Complex mass in the left lobe of the liver measuring 5.6 cm. 3. No biliary ductal dilatation. Further evaluation with liver mass protocol MRI or CT is recommended. Xr Chest Port    Result Date: 11/13/2019  PORTABLE CHEST RADIOGRAPH/S: 11/13/2019 4:53 AM Clinical history: Wheezing and dyspnea INDICATION:   wheeze, sob COMPARISON: 11/12/2019 FINDINGS: AP portable upright view of the chest demonstrates a stable  cardiopericardial silhouette. The lungs are adequately expanded. Minimal interstitial edema. No pleural effusion or pneumothorax. The osseous structures are unremarkable. Patient is on a cardiac monitor. IMPRESSION: Minimal interstitial edema. No significant change. .     No results found for: SDES  Lab Results   Component Value Date/Time    Culture result: NO GROWTH 3 DAYS 11/17/2019 10:30 AM    Culture result: NO GROWTH 2 DAYS, AEROBICALLY 11/13/2019 04:15 PM    Culture result: (A) 11/13/2019 04:15 PM     MODERATE ANAEROBIC GRAM NEGATIVE RODS ISOLATED . .. PLEASE REFER TO [facility] [id number] FOR IDENTIFICATION     All Micro Results     Procedure Component Value Units Date/Time    CULTURE, BLOOD, PAIRED [[id number]] Collected:  11/17/19 1030    Order Status:  Completed Specimen:  Blood Updated:  11/20/19 0518     Special Requests: NO SPECIAL REQUESTS        Culture result: NO GROWTH 3 DAYS       CULTURE, ANAEROBIC [[id number]]  (Abnormal) Collected:  11/13/19 1600    Order Status:  Completed Specimen:  Abscess Updated:  11/18/19 1131     Special Requests: LIVER     Culture result:       MODERATE FUSOBACTERIUM SPECIES BETA LACTAMASE NEGATIVE          CULTURE, BLOOD, PAIRED [[id number]]  (Abnormal) Collected:  11/12/19 0803    Order Status:  Completed Specimen:  Blood Updated:  11/18/19 1019     Special Requests: NO SPECIAL REQUESTS        Culture result:       FUSOBACTERIUM SPECIES BETA LACTAMASE NEGATIVE GROWING IN 2 OF 4 BOTTLES DRAWN (SITES = L AC AND R AC)                  PRELIMINARY REPORT OF ANAEROBIC GRAM NEGATIVE RODS GROWING IN 2 OF 4 BOTTLES DRAWN CALLED TO AND READ BACK BY Beto Corona RN ON 11/17/19 AT 6495 TA.                  REMAINING BOTTLE(S) HAS/HAVE NO GROWTH IN 5 DAYS          CULTURE, Zena Zapata STAIN [[id number]]  (Abnormal) Collected:  11/13/19 1530    Order Status:  Completed Specimen:  Abscess Updated:  11/15/19 1416     Special Requests: LIVER        GRAM STAIN 3+ WBCS SEEN         NO ORGANISMS SEEN        Culture result: NO GROWTH IN 2 DAYS, AEROBICALLY            MODERATE ANAEROBIC GRAM NEGATIVE RODS ISOLATED . ... PLEASE REFER TO [facility] [id number], FOR IDENTIFICATION          CULTURE, Zena Zapata STAIN [[id number]]  (Abnormal) Collected:  11/13/19 1615    Order Status:  Completed Specimen:  Abscess Updated:  11/15/19 1414     Special Requests: LIVER        GRAM STAIN 2+ WBCS SEEN         NO ORGANISMS SEEN        Culture result: NO GROWTH 2 DAYS, AEROBICALLY            MODERATE ANAEROBIC GRAM NEGATIVE RODS ISOLATED . .. PLEASE REFER TO [facility] [id number] FOR IDENTIFICATION          CULTURE, TISSUE W Elfreda Hodgkin [[id number]] Collected:  11/13/19 1600    Order Status:  Canceled Specimen:  Liver     CULTURE, BODY FLUID Dewain Bobbi STAIN [[id number]] Collected:  11/13/19 1600    Order Status:  Canceled Specimen:  Miscellaneous Fluid     LEGIONELLA PNEUMOPHILA AG, URINE [[id number]] Collected:  11/12/19 1540    Order Status:  Completed Specimen:  Urine Updated:  11/13/19 1536     Source URINE        L pneumophila S1 Ag, urine NEGATIVE         Comment: (NOTE)  Presumptive negative for L. pneumophila serogroup 1 antigen in urine,  suggesting no recent or current infection. Legionnaires' disease  cannot be ruled out since other serogroups and species may also  cause disease. Performed At: [address] [id number]  Jelena Bashir MD [place]:[id number]         C. DIFFICILE AG & TOXIN A/B [[id number]] Collected:  11/12/19 1715    Order Status:  Canceled Specimen:  Stool     ENTERIC BACT. Delorise Copas [[id number]] Collected:  11/12/19 1715    Order Status:  Canceled     URINE CULTURE HOLD SAMPLE [[id number]] Collected:  11/12/19 1540    Order Status:  Completed Specimen:  Urine from Serum Updated:  11/12/19 1551     Urine culture hold       URINE ON HOLD IN MICROBIOLOGY DEPT FOR 3 DAYS. IF UNPRESERVED URINE IS SUBMITTED, IT CANNOT BE USED FOR ADDITIONAL TESTING AFTER 24 HRS, RECOLLECTION WILL BE REQUIRED. INFLUENZA A & B AG (RAPID TEST) [[id number]] Collected:  11/12/19 1312    Order Status:  Completed Specimen:  Nasopharyngeal from Nasal washing Updated:  11/12/19 1333     Influenza A Antigen NEGATIVE         Influenza B Antigen NEGATIVE              Labs: reviewed by myself.      Recent Labs     11/20/19 0317 11/19/19 0214   WBC 10.3 10.6   HGB 12.3 11.9*   HCT 38.0 36.5*    275     Recent Labs     11/20/19 0317 11/19/19 0214 11/18/19  0338   * 132* 131*   K 4.4 3.9 4.0    101 101   CO2 24 23 25   BUN 7 8 7   CREA 0.80 0.92 0.79   GLU 92 175* 111*   CA 8.1* 8.0* 8.0*     No results for input(s): SGOT, GPT, ALT, AP, TBIL, TBILI, TP, ALB, GLOB, GGT, AML, LPSE in the last 72 hours. No lab exists for component: AMYP, HLPSE  No results for input(s): INR, PTP, APTT, INREXT, INREXT in the last 72 hours. No results for input(s): FE, TIBC, PSAT, FERR in the last 72 hours. No results found for: FOL, RBCF   No results for input(s): PH, PCO2, PO2 in the last 72 hours. No results for input(s): CPK, CKNDX, TROIQ in the last 72 hours.     No lab exists for component: CPKMB  Lab Results   Component Value Date/Time    Cholesterol, total 83 11/16/2019 03:12 AM    HDL Cholesterol 9 11/16/2019 03:12 AM    LDL, calculated 44.2 11/16/2019 03:12 AM    Triglyceride 149 11/16/2019 03:12 AM    CHOL/HDL Ratio 9.2 (H) 11/16/2019 03:12 AM     No results found for: GLUCPOC  Lab Results   Component Value Date/Time    Color DARK YELLOW 11/12/2019 03:40 PM    Appearance CLOUDY (A) 11/12/2019 03:40 PM    Specific gravity 1.023 11/12/2019 03:40 PM    pH (UA) 6.5 11/12/2019 03:40 PM    Protein 100 (A) 11/12/2019 03:40 PM    Glucose 100 (A) 11/12/2019 03:40 PM    Ketone TRACE (A) 11/12/2019 03:40 PM    Urobilinogen 4.0 (H) 11/12/2019 03:40 PM    Nitrites NEGATIVE  11/12/2019 03:40 PM    Leukocyte Esterase NEGATIVE  11/12/2019 03:40 PM    Epithelial cells FEW 11/12/2019 03:40 PM    Bacteria NEGATIVE  11/12/2019 03:40 PM    WBC 0-4 11/12/2019 03:40 PM    RBC 0-5 11/12/2019 03:40 PM         Medications Reviewed:     Current Facility-Administered Medications   Medication Dose Route Frequency    oxyCODONE-acetaminophen (PERCOCET) 5-325 mg per tablet 1 Tab  1 Tab Oral Q4H PRN    lactobac ac& pc-s.therm-b.anim (XIOMARA Q/RISAQUAD)  1 Cap Oral DAILY    piperacillin-tazobactam (ZOSYN) 3.375 g in 0.9% sodium chloride (MBP/ADV) 100 mL  3.375 g IntraVENous Q8H    albuterol-ipratropium (DUO-NEB) 2.5 MG-0.5 MG/3 ML  3 mL Nebulization Q4H PRN    acetaminophen (TYLENOL) tablet 650 mg  650 mg Oral Q6H PRN    ondansetron (ZOFRAN) injection 4 mg  4 mg IntraVENous Q8H PRN    sodium chloride (NS) flush 5-40 mL  5-40 mL IntraVENous Q8H    sodium chloride (NS) flush 5-40 mL  5-40 mL IntraVENous PRN     ______________________________________________________________________  EXPECTED LENGTH OF STAY: 4d 19h  ACTUAL LENGTH OF STAY:          8                 Kenya Chong MD     Patient's emergency contacts:  Extended Emergency Contact Information  Primary Emergency Contact: None, Given  Home Phone: [phone]  Relation: Other Non-relative  Secondary Emergency Contact: Allan Cerna  Mobile Phone: [phone]  Relation:

## 2019-11-20 NOTE — PROGRESS NOTES
Bedside shift change report given to [facility] (oncoming nurse) by Umer Upton (offgoing nurse). Report included the following information SBAR.

## 2019-11-20 NOTE — ANESTHESIA PREPROCEDURE EVALUATION
Relevant Problems   No relevant active problems       Anesthetic History   No history of anesthetic complications            Review of Systems / Medical History  Patient summary reviewed, nursing notes reviewed and pertinent labs reviewed    Pulmonary  Within defined limits                 Neuro/Psych   Within defined limits           Cardiovascular  Within defined limits                Exercise tolerance: >4 METS     GI/Hepatic/Renal  Within defined limits         Liver disease    Comments: Hep abcess  divertic dz w/microperf and sepsis Endo/Other  Within defined limits           Other Findings            Physical Exam    Airway  Mallampati: II  TM Distance: > 6 cm  Neck ROM: normal range of motion   Mouth opening: Normal     Cardiovascular  Regular rate and rhythm,  S1 and S2 normal,  no murmur, click, rub, or gallop             Dental    Dentition: Poor dentition     Pulmonary  Breath sounds clear to auscultation               Abdominal  GI exam deferred       Other Findings            Anesthetic Plan    ASA: 3  Anesthesia type: MAC          Induction: Intravenous  Anesthetic plan and risks discussed with: Patient

## 2019-11-21 LAB
ANION GAP SERPL CALC-SCNC: 6 MMOL/L (ref 5–15)
BASOPHILS # BLD: 0.1 K/UL (ref 0–0.1)
BASOPHILS NFR BLD: 1 % (ref 0–1)
BUN SERPL-MCNC: 9 MG/DL (ref 6–20)
BUN/CREAT SERPL: 10 (ref 12–20)
CALCIUM SERPL-MCNC: 8.3 MG/DL (ref 8.5–10.1)
CHLORIDE SERPL-SCNC: 100 MMOL/L (ref 97–108)
CO2 SERPL-SCNC: 24 MMOL/L (ref 21–32)
CREAT SERPL-MCNC: 0.87 MG/DL (ref 0.7–1.3)
DIFFERENTIAL METHOD BLD: NORMAL
EOSINOPHIL # BLD: 0.2 K/UL (ref 0–0.4)
EOSINOPHIL NFR BLD: 2 % (ref 0–7)
ERYTHROCYTE [DISTWIDTH] IN BLOOD BY AUTOMATED COUNT: 13.4 % (ref 11.5–14.5)
GLUCOSE SERPL-MCNC: 105 MG/DL (ref 65–100)
HCT VFR BLD AUTO: 39.6 % (ref 36.6–50.3)
HGB BLD-MCNC: 12.9 G/DL (ref 12.1–17)
IMM GRANULOCYTES # BLD AUTO: 0 K/UL
IMM GRANULOCYTES NFR BLD AUTO: 0 %
LYMPHOCYTES # BLD: 1.4 K/UL (ref 0.8–3.5)
LYMPHOCYTES NFR BLD: 16 % (ref 12–49)
MCH RBC QN AUTO: 30 PG (ref 26–34)
MCHC RBC AUTO-ENTMCNC: 32.6 G/DL (ref 30–36.5)
MCV RBC AUTO: 92.1 FL (ref 80–99)
MONOCYTES # BLD: 0.5 K/UL (ref 0–1)
MONOCYTES NFR BLD: 6 % (ref 5–13)
NEUTS BAND NFR BLD MANUAL: 3 % (ref 0–6)
NEUTS SEG # BLD: 6.8 K/UL (ref 1.8–8)
NEUTS SEG NFR BLD: 72 % (ref 32–75)
NRBC # BLD: 0 K/UL (ref 0–0.01)
NRBC BLD-RTO: 0 PER 100 WBC
OSMOLALITY UR: 473 MOSM/KG H2O
PLATELET # BLD AUTO: 385 K/UL (ref 150–400)
PLATELET COMMENTS,PCOM: NORMAL
PMV BLD AUTO: 9.6 FL (ref 8.9–12.9)
POTASSIUM SERPL-SCNC: 5 MMOL/L (ref 3.5–5.1)
RBC # BLD AUTO: 4.3 M/UL (ref 4.1–5.7)
RBC MORPH BLD: NORMAL
RBC MORPH BLD: NORMAL
SODIUM SERPL-SCNC: 130 MMOL/L (ref 136–145)
SODIUM UR-SCNC: 93 MMOL/L
WBC # BLD AUTO: 9 K/UL (ref 4.1–11.1)

## 2019-11-21 PROCEDURE — 84300 ASSAY OF URINE SODIUM: CPT

## 2019-11-21 PROCEDURE — 36415 COLL VENOUS BLD VENIPUNCTURE: CPT

## 2019-11-21 PROCEDURE — 74011000258 HC RX REV CODE- 258: Performed by: SURGERY

## 2019-11-21 PROCEDURE — 74011000258 HC RX REV CODE- 258: Performed by: INTERNAL MEDICINE

## 2019-11-21 PROCEDURE — 65270000032 HC RM SEMIPRIVATE

## 2019-11-21 PROCEDURE — 82436 ASSAY OF URINE CHLORIDE: CPT

## 2019-11-21 PROCEDURE — 74011250636 HC RX REV CODE- 250/636: Performed by: SURGERY

## 2019-11-21 PROCEDURE — 74011250636 HC RX REV CODE- 250/636: Performed by: INTERNAL MEDICINE

## 2019-11-21 PROCEDURE — 74011250637 HC RX REV CODE- 250/637: Performed by: INTERNAL MEDICINE

## 2019-11-21 PROCEDURE — 80048 BASIC METABOLIC PNL TOTAL CA: CPT

## 2019-11-21 PROCEDURE — 85025 COMPLETE CBC W/AUTO DIFF WBC: CPT

## 2019-11-21 PROCEDURE — 83935 ASSAY OF URINE OSMOLALITY: CPT

## 2019-11-21 RX ADMIN — Medication 10 ML: at 02:41

## 2019-11-21 RX ADMIN — OXYCODONE AND ACETAMINOPHEN 1 TABLET: 5; 325 TABLET ORAL at 01:56

## 2019-11-21 RX ADMIN — Medication 10 ML: at 13:50

## 2019-11-21 RX ADMIN — PIPERACILLIN SODIUM AND TAZOBACTAM SODIUM 3.38 G: 3; .375 INJECTION, POWDER, LYOPHILIZED, FOR SOLUTION INTRAVENOUS at 02:40

## 2019-11-21 RX ADMIN — OXYCODONE AND ACETAMINOPHEN 1 TABLET: 5; 325 TABLET ORAL at 19:28

## 2019-11-21 RX ADMIN — PIPERACILLIN AND TAZOBACTAM 3.38 G: 3; .375 INJECTION, POWDER, FOR SOLUTION INTRAVENOUS at 11:56

## 2019-11-21 RX ADMIN — PIPERACILLIN AND TAZOBACTAM 3.38 G: 3; .375 INJECTION, POWDER, FOR SOLUTION INTRAVENOUS at 19:29

## 2019-11-21 RX ADMIN — OXYCODONE AND ACETAMINOPHEN 1 TABLET: 5; 325 TABLET ORAL at 13:49

## 2019-11-21 RX ADMIN — Medication 1 CAPSULE: at 09:38

## 2019-11-21 NOTE — ADVANCED PRACTICE NURSE
[facility] [address] [place] [address] Drive, [address] [place] [address] [address]   Дмитрий Oh   [phone]  Konstantin Chang [facility]  11/21/2019  3:12 PM      Scheduled for Cardiac MRI at [facility]. Scheduled for 9 am.  Transport to and from arranged.   Patient does not need to be NPO    Lashaun Kaur PA-C

## 2019-11-21 NOTE — PROGRESS NOTES
Hospitalist Progress Note  Doc Campa MD  Answering service: [phone] from in house phone        Date of Service:  2019  NAME:  Deana Fernando III  :  1960  MRN:  [id number]  PCP: Meron Varela MD          Admission Summary:     Darren Mathews is a 62 y.o. male who presented with diarrhea, abdominal pain    Interval history / Subjective:   Patient seen for Follow up of CC; sepsis  Patient feeling really well. 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 Carotid Doppler negative for thrombus  Repeat BC x 2 : NGTD  Liver biopsy and cultures:  anaerobic GNR, BL neg, final identification pending. Significantly elevated procalcitonin level at 47, repeat level is much improved to 16.4. Continue IV Zosyn, ID recommendations noted. Patient aware that he will require longer (4-6wks) of IV abx and until radiological resolution has been achieved. TTE neg for vegetations. CT abdomen pelvis on 1118 still with persistent be related and hypodense liver lesion without significant change with possible increased surrounding edema. No evidence of diverticular abscess or perforation  -LATANYA showed possible right atrial mass. Cardiology suggested cardiac MRI, ordered. Discussed with case management, patient needs transport to [place] [address] where they can do cardiac MRI.  - Lactic acidosis sec to sepsis  Resolved    Acute respiratory insufficiency, volume overload   Resolved, off supplemental oxygen. Hyponatremia: It was initially thought to be due to sepsis and dehydration. Hyponatremia persisted after the sepsis and dehydration have resolved. TSH on admission was normal.  Check a.m. cortisol and urine lites to assess for SIADH.     -Recurrent hypokalemia: resolved, replete prn    #elevated troponin  Likely due to sepsis/tachycardia/demand supply mismatch  Cards consult noted, no ACS as per cardiology. Echocardiogram noted, normal LVEF, cardio no follow-up with outpatient. No active CP, hx reported hx of CAD    -Morbid obesity: Weight loss recommended    Code status: Full Code    DVT prophylaxis: SCDs    Care Plan discussed with: Patient/Family, Nurse and Consultant Gen Surg    Disposition: Patient needs prolonged intravenous antibiotics on discharge    Hospital Problems  Date Reviewed: 11/20/2019          Codes Class Noted POA    * (Principal) Sepsis ([facility] [address] ICD-10-CM: A41.9  ICD-9-CM: 038.9, 995.91  11/17/2019 Yes        Liver abscess ICD-10-CM: K75.0  ICD-9-CM: 572.0  11/17/2019 Yes        Gram-neg septicemia ([facility] [address] ICD-10-CM: A41.50  ICD-9-CM: 038.40  11/17/2019 Yes        Diverticulitis of large intestine without bleeding ICD-10-CM: K57.32  ICD-9-CM: 562.11  11/17/2019 Yes        Hyponatremia ICD-10-CM: E87.1  ICD-9-CM: 276.1  11/12/2019 Yes                Review of Systems:   A comprehensive review of systems was negative. Physical Examination:     General appearance: Alert, awake, appears older than his stated age, comfortable  Eyes: conjunctivae/corneas clear. PERRL. Lungs: clear to auscultation bilaterally, no rales, wheezing or rhonchi  Heart: sinus tachycardia, no murmurs, no gallops  Abdomen: soft, NT, NG, NR. Mild right upper quadrant tenderness without rebound or guarding  Neurologic: AAOx3, able to move all extremities    Vital Signs:    Last 24hrs VS reviewed since prior progress note.  Most recent are:    Visit Vitals  /73   Pulse 86   Temp 97.9 °F (36.6 °C)   Resp 18   Ht 5' 11\" (1.803 m)   Wt 105.9 kg (233 lb 8 oz)   SpO2 96%   BMI 32.57 kg/m²         Intake/Output Summary (Last 24 hours) at 11/21/2019 1455  Last data filed at 11/20/2019 1816  Gross per 24 hour   Intake    Output 750 ml   Net -750 ml        Tmax:  Temp (24hrs), Av.7 °F (36.5 °C), Min:97.2 °F (36.2 °C), Max:98 °F (36.7 °C)      Data Review:   Data reviewed by myself:  Xr Chest Pa Lat    Result Date: 2019  INDICATION:  febrile illness Exam: Chest 2 views. Comparison: 3/22/2017. Findings: Cardiomediastinal silhouette is normal. Pulmonary vasculature is slightly prominent and increased since the prior study. Mild interstitial prominence predominantly at the lung bases with minimal left basilar atelectasis. No pneumothorax or focal consolidation. . Chronic right-sided rib fractures. Impression: 1. Mild prominence of the central pulmonary vasculature with minimal interstitial prominence at the lung bases. Shilpi Sabal developing mild interstitial edema as opposed to atypical infection     Ct Chest Wo Cont    Result Date: 2019  EXAM:  CT thorax without contrast INDICATION:  Cough. COMPARISON: Chest radiographs  TECHNIQUE: Helical CT of the chest is performed without intravenous contrast. Coronal and sagittal reformatted images are obtained. CT dose reduction was achieved through use of a standardized protocol tailored for this examination and automatic exposure control for dose modulation. Adaptive statistical iterative reconstruction (ASIR) was utilized. FINDINGS: The visualized thyroid gland is unremarkable. The aorta and main pulmonary artery are normal in caliber. There is coronary artery atherosclerosis. The heart size is normal.  There is no pericardial effusion. There are no enlarged axillary, mediastinal, or hilar lymph nodes. There are minimal subpleural reticular opacities in the lower lungs. There is minimal left basilar scarring or atelectasis. There is no suspicious lung nodule, mass, or consolidation. In the limited images of the upper abdomen, the partially imaged solid organs are unremarkable. Degenerative changes in the spine. There is no aggressive bony lesion.      IMPRESSION: Minimal interstitial changes in the lower lungs with minimal left basilar scarring or atelectasis. No evidence for atypical infection or other acute abnormality. Ct Abd Pelv Wo Cont    Result Date: 11/12/2019  EXAM: CT ABD PELV WO CONT INDICATION: abd pain COMPARISON: CT chest 11/12/2019 CONTRAST:  None. TECHNIQUE: Thin axial images were obtained through the abdomen and pelvis. Coronal and sagittal reconstructions were generated. Oral contrast was not administered. CT dose reduction was achieved through use of a standardized protocol tailored for this examination and automatic exposure control for dose modulation. The absence of intravenous contrast material reduces the sensitivity for evaluation of the solid parenchymal organs of the abdomen. FINDINGS: LUNG BASES: There is mild bibasilar atelectasis. INCIDENTALLY IMAGED HEART AND MEDIASTINUM: Unremarkable. LIVER: Diffuse fatty infiltration of the liver. GALLBLADDER: Unremarkable. SPLEEN: No mass. PANCREAS: No mass or ductal dilatation. ADRENALS: Unremarkable. KIDNEYS/URETERS: No mass, calculus, or hydronephrosis. STOMACH: Unremarkable. SMALL BOWEL: No bowel obstruction or perforation COLON: Extensive diverticulosis of the colon. There is bowel wall thickening of the sigmoid colon with extensive diverticula as well as pericolonic inflammation, but no focal fluid collections. There is also a small amount of gas adjacent to the affected loop of sigmoid colon, which may be extraluminal. There is no free intraperitoneal air. APPENDIX: Unremarkable. PERITONEUM: No ascites RETROPERITONEUM: No lymphadenopathy or aortic aneurysm. REPRODUCTIVE ORGANS: The prostate is noted. URINARY BLADDER: No mass or calculus. BONES: No destructive bone lesion. ADDITIONAL COMMENTS: There is degenerative disc disease which is most advanced at L4-L5. IMPRESSION: 1. Concentric bowel wall thickening of a 7 cm segment of sigmoid colon, where there are extensive diverticula and mild pericolonic inflammation.  This is consistent with colitis, which may be due to diverticulitis. Notably, there is a small amount of extraluminal gas suspected, immediately adjacent to the affected segment of sigmoid colon. This may represent a localized perforation. 2. No evidence of abscess or bowel obstruction. 3. Diffuse fatty infiltration of the liver. [facility]    Result Date: 11/12/2019  INDICATION:  Elevated LFTs COMPARISON:  CT scan earlier today FINDINGS: Limited right upper quadrant ultrasound was performed. The liver is diffusely increased in echogenicity and enlarged. Within the left lobe of the liver, there is an ill-defined, mixed echogenicity mass, measuring 5.6 x 5.5 x 3.6 cm. . The common bile duct is normal measuring 3 mm in diameter. The gallbladder is normal. The right kidney measures 11.8 cm in length. IMPRESSION: 1. Enlarged, echogenic liver, compatible with diffuse fatty infiltration. 2. Complex mass in the left lobe of the liver measuring 5.6 cm. 3. No biliary ductal dilatation. Further evaluation with liver mass protocol MRI or CT is recommended. Xr Chest Port    Result Date: 11/13/2019  PORTABLE CHEST RADIOGRAPH/S: 11/13/2019 4:53 AM Clinical history: Wheezing and dyspnea INDICATION:   wheeze, sob COMPARISON: 11/12/2019 FINDINGS: AP portable upright view of the chest demonstrates a stable  cardiopericardial silhouette. The lungs are adequately expanded. Minimal interstitial edema. No pleural effusion or pneumothorax. The osseous structures are unremarkable. Patient is on a cardiac monitor. IMPRESSION: Minimal interstitial edema. No significant change. .     No results found for: SDES  Lab Results   Component Value Date/Time    Culture result: NO GROWTH 4 DAYS 11/17/2019 10:30 AM    Culture result: NO GROWTH 2 DAYS, AEROBICALLY 11/13/2019 04:15 PM    Culture result: (A) 11/13/2019 04:15 PM     MODERATE ANAEROBIC GRAM NEGATIVE RODS ISOLATED . .. PLEASE REFER TO Jerrell Harada [id number] FOR IDENTIFICATION     All Micro Results     Procedure Component Value Units Date/Time    CULTURE, BLOOD, PAIRED [[id number]] Collected:  11/17/19 1030    Order Status:  Completed Specimen:  Blood Updated:  11/21/19 0558     Special Requests: NO SPECIAL REQUESTS        Culture result: NO GROWTH 4 DAYS       CULTURE, ANAEROBIC [[id number]]  (Abnormal) Collected:  11/13/19 1600    Order Status:  Completed Specimen:  Abscess Updated:  11/18/19 1131     Special Requests: LIVER     Culture result:       MODERATE FUSOBACTERIUM SPECIES BETA LACTAMASE NEGATIVE          CULTURE, BLOOD, PAIRED [[id number]]  (Abnormal) Collected:  11/12/19 0803    Order Status:  Completed Specimen:  Blood Updated:  11/18/19 1019     Special Requests: NO SPECIAL REQUESTS        Culture result:       FUSOBACTERIUM SPECIES BETA LACTAMASE NEGATIVE GROWING IN 2 OF 4 BOTTLES DRAWN (SITES = L AC AND R AC)                  PRELIMINARY REPORT OF ANAEROBIC GRAM NEGATIVE RODS GROWING IN 2 OF 4 BOTTLES DRAWN CALLED TO AND READ BACK BY Cooper Mayo RN ON 11/17/19 AT 5182 TA.                  REMAINING BOTTLE(S) HAS/HAVE NO GROWTH IN 5 DAYS          CULTURE, Sarahi Ready STAIN [[id number]]  (Abnormal) Collected:  11/13/19 1530    Order Status:  Completed Specimen:  Abscess Updated:  11/15/19 1416     Special Requests: LIVER        GRAM STAIN 3+ WBCS SEEN         NO ORGANISMS SEEN        Culture result: NO GROWTH IN 2 DAYS, AEROBICALLY            MODERATE ANAEROBIC GRAM NEGATIVE RODS ISOLATED . ... PLEASE REFER TO [facility] [id number], FOR IDENTIFICATION          CULTURE, Sarahi Ready STAIN [[id number]]  (Abnormal) Collected:  11/13/19 1615    Order Status:  Completed Specimen:  Abscess Updated:  11/15/19 1414     Special Requests: LIVER        GRAM STAIN 2+ WBCS SEEN         NO ORGANISMS SEEN        Culture result: NO GROWTH 2 DAYS, AEROBICALLY            MODERATE ANAEROBIC GRAM NEGATIVE RODS ISOLATED . .. PLEASE REFER TO [facility] [id number] FOR IDENTIFICATION          CULTURE, TISSUE W Khoa Encarnacion [[id number]] Collected:  11/13/19 1600 Order Status:  Canceled Specimen:  Liver     CULTURE, BODY FLUID Alma Nails [[id number]] Collected:  11/13/19 1600    Order Status:  Canceled Specimen:  Miscellaneous Fluid     LEGIONELLA PNEUMOPHILA AG, URINE [[id number]] Collected:  11/12/19 1540    Order Status:  Completed Specimen:  Urine Updated:  11/13/19 1536     Source URINE        L pneumophila S1 Ag, urine NEGATIVE         Comment: (NOTE)  Presumptive negative for L. pneumophila serogroup 1 antigen in urine,  suggesting no recent or current infection. Legionnaires' disease  cannot be ruled out since other serogroups and species may also  cause disease. Performed At: [address] [id number]  Bailey Barillas MD CA:[id number]         C. DIFFICILE AG & TOXIN A/B [[id number]] Collected:  11/12/19 1715    Order Status:  Canceled Specimen:  Stool     ENTERIC BACT. Denys Flaherty [[id number]] Collected:  11/12/19 1715    Order Status:  Canceled     URINE CULTURE HOLD SAMPLE [[id number]] Collected:  11/12/19 1540    Order Status:  Completed Specimen:  Urine from Serum Updated:  11/12/19 1551     Urine culture hold       URINE ON HOLD IN MICROBIOLOGY DEPT FOR 3 DAYS. IF UNPRESERVED URINE IS SUBMITTED, IT CANNOT BE USED FOR ADDITIONAL TESTING AFTER 24 HRS, RECOLLECTION WILL BE REQUIRED. INFLUENZA A & B AG (RAPID TEST) [[id number]] Collected:  11/12/19 1312    Order Status:  Completed Specimen:  Nasopharyngeal from Nasal washing Updated:  11/12/19 1333     Influenza A Antigen NEGATIVE         Influenza B Antigen NEGATIVE              Labs: reviewed by myself.      Recent Labs     11/21/19 0203 11/20/19 0317   WBC 9.0 10.3   HGB 12.9 12.3   HCT 39.6 38.0    335     Recent Labs     11/21/19  0203 11/20/19  0317 11/19/19  0214   * 133* 132*   K 5.0 4.4 3.9    103 101   CO2 24 24 23   BUN 9 7 8   CREA 0.87 0.80 0.92   * 92 175*   CA 8.3* 8.1* 8.0*     No results for input(s): SGOT, GPT, ALT, AP, TBIL, TBILI, TP, ALB, GLOB, GGT, AML, LPSE in the last 72 hours. No lab exists for component: AMYP, HLPSE  No results for input(s): INR, PTP, APTT, INREXT, INREXT in the last 72 hours. No results for input(s): FE, TIBC, PSAT, FERR in the last 72 hours. No results found for: FOL, RBCF   No results for input(s): PH, PCO2, PO2 in the last 72 hours. No results for input(s): CPK, CKNDX, TROIQ in the last 72 hours.     No lab exists for component: CPKMB  Lab Results   Component Value Date/Time    Cholesterol, total 83 11/16/2019 03:12 AM    HDL Cholesterol 9 11/16/2019 03:12 AM    LDL, calculated 44.2 11/16/2019 03:12 AM    Triglyceride 149 11/16/2019 03:12 AM    CHOL/HDL Ratio 9.2 (H) 11/16/2019 03:12 AM     No results found for: GLUCPOC  Lab Results   Component Value Date/Time    Color DARK YELLOW 11/12/2019 03:40 PM    Appearance CLOUDY (A) 11/12/2019 03:40 PM    Specific gravity 1.023 11/12/2019 03:40 PM    pH (UA) 6.5 11/12/2019 03:40 PM    Protein 100 (A) 11/12/2019 03:40 PM    Glucose 100 (A) 11/12/2019 03:40 PM    Ketone TRACE (A) 11/12/2019 03:40 PM    Urobilinogen 4.0 (H) 11/12/2019 03:40 PM    Nitrites NEGATIVE  11/12/2019 03:40 PM    Leukocyte Esterase NEGATIVE  11/12/2019 03:40 PM    Epithelial cells FEW 11/12/2019 03:40 PM    Bacteria NEGATIVE  11/12/2019 03:40 PM    WBC 0-4 11/12/2019 03:40 PM    RBC 0-5 11/12/2019 03:40 PM         Medications Reviewed:     Current Facility-Administered Medications   Medication Dose Route Frequency    piperacillin-tazobactam (ZOSYN) 3.375 g in 0.9% sodium chloride (MBP/ADV) 100 mL  3.375 g IntraVENous Q8H    oxyCODONE-acetaminophen (PERCOCET) 5-325 mg per tablet 1 Tab  1 Tab Oral Q4H PRN    lactobac ac& pc-s.therm-b.anim (XIOMARA Q/RISAQUAD)  1 Cap Oral DAILY    albuterol-ipratropium (DUO-NEB) 2.5 MG-0.5 MG/3 ML  3 mL Nebulization Q4H PRN    acetaminophen (TYLENOL) tablet 650 mg  650 mg Oral Q6H PRN    ondansetron (ZOFRAN) injection 4 mg  4 mg IntraVENous Q8H PRN    sodium chloride (NS) flush 5-40 mL  5-40 mL IntraVENous Q8H    sodium chloride (NS) flush 5-40 mL  5-40 mL IntraVENous PRN     ______________________________________________________________________  EXPECTED LENGTH OF STAY: 4d 19h  ACTUAL LENGTH OF STAY:          9                 Wendy Mayfield MD     Patient's emergency contacts:  Extended Emergency Contact Information  Primary Emergency Contact: None, Given  Home Phone: [phone]  Relation: Other Non-relative  Secondary Emergency Contact: Jennifer Sarabia  Mobile Phone: [phone]  Relation:

## 2019-11-21 NOTE — PROGRESS NOTES
BRANDO plan:    -CM noted consult for assist with cardiac MRI. Cm discussed with nursing, patient already has the MRI appt arranged for tomorrow at 9:00 am at [address] [facility] transport () and they will be providing transportation for patient tomorrow.  Patient will need to be ready by 8:15 am.     Jennifer Kohler [facility], [address] [place] [address]

## 2019-11-21 NOTE — PROGRESS NOTES
Infectious DiseaseProgress Note      HPI:       Mr India Kay seen earlier  Denied fever, chills, nausea, vomiting, abd pain, diarrhea   Plans for cardiac mri at [facility]      Current Facility-Administered Medications:     [START ON 11/22/2019] OTHER(NON-FORMULARY) 1 g, 1 g, IntraVENous, DAILY, Nely Alvarado,     oxyCODONE-acetaminophen (PERCOCET) 5-325 mg per tablet 1 Tab, 1 Tab, Oral, Q4H PRN, Flores Acosta MD, 1 Tab at 11/21/19 0156    lactobac ac& pc-s.therm-b.anim (XIOMARA Q/RISAQUAD), 1 Cap, Oral, DAILY, Flores Acosta MD, 1 Cap at 11/21/19 0938    albuterol-ipratropium (DUO-NEB) 2.5 MG-0.5 MG/3 ML, 3 mL, Nebulization, Q4H PRN, Flores Acosta MD    acetaminophen (TYLENOL) tablet 650 mg, 650 mg, Oral, Q6H PRN, Flores Acosta MD, 650 mg at 11/15/19 1540    ondansetron (ZOFRAN) injection 4 mg, 4 mg, IntraVENous, Q8H PRN, Son Thapa MD    sodium chloride (NS) flush 5-40 mL, 5-40 mL, IntraVENous, Q8H, Son Thapa MD, 10 mL at 11/21/19 0241    sodium chloride (NS) flush 5-40 mL, 5-40 mL, IntraVENous, PRN, Son Thapa MD, 10 mL at 11/20/19 1816          Physical Exam:    Vitals:   Patient Vitals for the past 24 hrs:   Temp Pulse Resp BP SpO2   11/21/19 0858 97.9 °F (36.6 °C) 86 18 114/73 96 %   11/21/19 0506 97.6 °F (36.4 °C) 72 18 111/68 96 %   11/20/19 2327 97.6 °F (36.4 °C) 73 15 118/82 97 %   11/20/19 1927 98 °F (36.7 °C) 86 18 123/75 98 %   11/20/19 1544 97.2 °F (36.2 °C) 87 14 125/85 97 %   11/20/19 1345  92 16 117/85 99 %   11/20/19 1330  (!) 103 16 107/85 99 %   11/20/19 1315  100 20 (!) 89/59    11/20/19 1312  (!) 112 12 103/60 99 %   11/20/19 1310    103/60      · GEN: NAD,  · HEENT:  no scleral icterus,  no cervical lymphadenopathy, no sinus tenderness, no thrush, missing tooth in the front, non tender to palpation of neck bilaterally   · CV: S1, S2 heard regularly,  · Lungs: Clear to auscultation bilaterally  · Abdomen: soft, non distended, non tender, no rash   · Extremities: no edema  · Skin: no rash        Labs:   Recent Results (from the past 24 hour(s))   METABOLIC PANEL, BASIC    Collection Time: 11/21/19  2:03 AM   Result Value Ref Range    Sodium 130 (L) 136 - 145 mmol/L    Potassium 5.0 3.5 - 5.1 mmol/L    Chloride 100 97 - 108 mmol/L    CO2 24 21 - 32 mmol/L    Anion gap 6 5 - 15 mmol/L    Glucose 105 (H) 65 - 100 mg/dL    BUN 9 6 - 20 MG/DL    Creatinine 0.87 0.70 - 1.30 MG/DL    BUN/Creatinine ratio 10 (L) 12 - 20      GFR est AA >60 >60 ml/min/1.73m2    GFR est non-AA >60 >60 ml/min/1.73m2    Calcium 8.3 (L) 8.5 - 10.1 MG/DL   CBC WITH AUTOMATED DIFF    Collection Time: 11/21/19  2:03 AM   Result Value Ref Range    WBC 9.0 4.1 - 11.1 K/uL    RBC 4.30 4. 10 - 5.70 M/uL    HGB 12.9 12.1 - 17.0 g/dL    HCT 39.6 36.6 - 50.3 %    MCV 92.1 80.0 - 99.0 FL    MCH 30.0 26.0 - 34.0 PG    MCHC 32.6 30.0 - 36.5 g/dL    RDW 13.4 11.5 - 14.5 %    PLATELET 201 823 - 688 K/uL    MPV 9.6 8.9 - 12.9 FL    NRBC 0.0 0  WBC    ABSOLUTE NRBC 0.00 0.00 - 0.01 K/uL    NEUTROPHILS 72 32 - 75 %    BAND NEUTROPHILS 3 0 - 6 %    LYMPHOCYTES 16 12 - 49 %    MONOCYTES 6 5 - 13 %    EOSINOPHILS 2 0 - 7 %    BASOPHILS 1 0 - 1 %    IMMATURE GRANULOCYTES 0 %    ABS. NEUTROPHILS 6.8 1.8 - 8.0 K/UL    ABS. LYMPHOCYTES 1.4 0.8 - 3.5 K/UL    ABS. MONOCYTES 0.5 0.0 - 1.0 K/UL    ABS. EOSINOPHILS 0.2 0.0 - 0.4 K/UL    ABS. BASOPHILS 0.1 0.0 - 0.1 K/UL    ABS. IMM.  GRANS. 0.0 K/UL    DF MANUAL      PLATELET COMMENTS Large Platelets      RBC COMMENTS MACROCYTOSIS  1+        RBC COMMENTS POLYCHROMASIA  1+           Microbiology Data:     Blood 11/17/19    Component Value Ref Range & Units Status   Special Requests: NO SPECIAL REQUESTS    Preliminary   Culture result: NO GROWTH 4 DAYS    Preliminary   Result History                  Blood: 11/12/19  Component Value Ref Range & Units Status   Special Requests: NO SPECIAL REQUESTS    Final   Culture result: Abnormal     Final   FUSOBACTERIUM SPECIES BETA LACTAMASE NEGATIVE GROWING IN 2 OF 4 BOTTLES DRAWN (SITES = L AC AND R AC)   Culture result: Abnormal     Final   PRELIMINARY REPORT OF ANAEROBIC GRAM NEGATIVE RODS GROWING IN 2 OF 4 BOTTLES DRAWN CALLED TO AND READ BACK BY Armen Gonzalez RN ON 11/17/19 AT 0647 TA. Culture result:    Final   REMAINING BOTTLE(S) HAS/HAVE NO GROWTH IN 5 DAYS    Result History                 Abscess 11/13/19       Component Value Ref Range & Units Status   Special Requests: LIVER    Final   GRAM STAIN 3+ WBCS SEEN    Final   GRAM STAIN NO ORGANISMS SEEN    Final   Culture result:   Final   NO GROWTH IN 2 DAYS, AEROBICALLY    Culture result: Abnormal     Final   MODERATE ANAEROBIC GRAM NEGATIVE RODS ISOLATED . ... PLEASE REFER TO Alex Larson [id number], FOR IDENTIFICATION    Result History     Specimen Information: Abscess        Component Value Ref Range & Units Status   Special Requests: LIVER   Final   Culture result: Abnormal     Final   MODERATE FUSOBACTERIUM SPECIES BETA LACTAMASE NEGATIVE    Result History         Component Value Ref Range & Units Status   Special Requests: LIVER    Final   GRAM STAIN 2+ WBCS SEEN    Final   GRAM STAIN NO ORGANISMS SEEN    Final   Culture result:   Final   NO GROWTH 2 DAYS, AEROBICALLY    Culture result: Abnormal     Final   MODERATE ANAEROBIC GRAM NEGATIVE RODS ISOLATED . .. PLEASE REFER TO [facility] Healthcarejuan diego Larson [id number] FOR IDENTIFICATION    Result History         Pathology Results:       No definite organisms identified on routine stains. Recommend correlation with pending microbiology testing. Amoebic infections may also result in liver abscess. Depending on clinical information, consider serologic testing Addendum Electronically Signed Out By Adalberto Silva MD   [facility]/11/15/2019         Specimen Source   1: Liver, Core biopsy with touch intepretation:   CYTOLOGIC INTERPRETATION:   1.  Liver, Core biopsy with touch intepretation:   Liver parenchyma with acute and chronic inflammation and macrovesicular steatosis   See comment   General Categorization   No cells diagnostic for malignancy   Specimen Adequacy   Satisfactory for evaluation     Imaging:   US ABD 11/12/19  COMPARISON:  CT scan earlier today     FINDINGS: Limited right upper quadrant ultrasound was performed. The liver is  diffusely increased in echogenicity and enlarged. Within the left lobe of the  liver, there is an ill-defined, mixed echogenicity mass, measuring 5.6 x 5.5 x  3.6 cm. . The common bile duct is normal measuring 3 mm in diameter. The  gallbladder is normal. The right kidney measures 11.8 cm in length.     IMPRESSION  IMPRESSION:      1. Enlarged, echogenic liver, compatible with diffuse fatty infiltration. 2. Complex mass in the left lobe of the liver measuring 5.6 cm.  3. No biliary ductal dilatation.     Further evaluation with liver mass protocol MRI or CT is recommended. CT ABD 11/13/19  FINDINGS:      LIVER: There is a 6.5 cm multiloculated low density cystic lesion in segment 2/3  of the left hepatic lobe concerning for abscess versus cystic neoplasm. Small  nonspecific low-density focus in the right hepatic lobe measuring 3.4 x 1.1 cm  (series 6, image 40). No other focal liver abnormality. No biliary ductal  dilatation   GALLBLADDER: Probable small stones but otherwise unremarkable in appearance. SPLEEN: Borderline splenomegaly  PANCREAS: No mass or ductal dilatation. ADRENALS: Unremarkable. KIDNEYS/URETERS: Symmetric nephrograms without evidence for focal mass. No  hydronephrosis. PERITONEUM: Borderline enlarged para-aortic retroperitoneal lymph nodes. No  other evidence for abdominal lymphadenopathy. No ascites. COLON: Inflammatory changes in the sigmoid colon are stable, likely related to  diverticulitis. Stable probable contained microperforation along the posterior  wall of the sigmoid colon (image 147). No free intraperitoneal gas. No evidence  for pericolonic abscess. APPENDIX: Unremarkable.   SMALL BOWEL: No dilatation or wall thickening. STOMACH: Unremarkable. PELVIS: No pelvic lymphadenopathy. Trace pericolonic free fluid. The bladder is  unremarkable. BONES: No destructive bone lesion. Mild to moderate lumbosacral spondylosis. VISUALIZED THORAX: Bibasilar subsegmental atelectasis  ADDITIONAL COMMENTS: N/A     IMPRESSION  IMPRESSION:     6 cm multiloculated low density/cystic lesion in the left hepatic lobe may  represent abscess in the appropriate clinical picture. Cystic neoplasm is  another possibility. There is a smaller 3 cm nonspecific low-density focus in  the right hepatic lobe.     Stable inflammatory changes involving the sigmoid colon. CT 11/12/19  COMPARISON: CT chest 11/12/2019     CONTRAST:  None.     TECHNIQUE:   Thin axial images were obtained through the abdomen and pelvis. Coronal and  sagittal reconstructions were generated. Oral contrast was not administered. CT  dose reduction was achieved through use of a standardized protocol tailored for  this examination and automatic exposure control for dose modulation.      The absence of intravenous contrast material reduces the sensitivity for  evaluation of the solid parenchymal organs of the abdomen.      FINDINGS:   LUNG BASES: There is mild bibasilar atelectasis. INCIDENTALLY IMAGED HEART AND MEDIASTINUM: Unremarkable. LIVER: Diffuse fatty infiltration of the liver. GALLBLADDER: Unremarkable. SPLEEN: No mass. PANCREAS: No mass or ductal dilatation. ADRENALS: Unremarkable. KIDNEYS/URETERS: No mass, calculus, or hydronephrosis. STOMACH: Unremarkable. SMALL BOWEL: No bowel obstruction or perforation  COLON: Extensive diverticulosis of the colon. There is bowel wall thickening of  the sigmoid colon with extensive diverticula as well as pericolonic  inflammation, but no focal fluid collections.  There is also a small amount of  gas adjacent to the affected loop of sigmoid colon, which may be extraluminal.  There is no free intraperitoneal air.  APPENDIX: Unremarkable. PERITONEUM: No ascites  RETROPERITONEUM: No lymphadenopathy or aortic aneurysm. REPRODUCTIVE ORGANS: The prostate is noted. URINARY BLADDER: No mass or calculus. BONES: No destructive bone lesion. ADDITIONAL COMMENTS: There is degenerative disc disease which is most advanced  at L4-L5.     IMPRESSION  IMPRESSION:     1. Concentric bowel wall thickening of a 7 cm segment of sigmoid colon, where  there are extensive diverticula and mild pericolonic inflammation. This is  consistent with colitis, which may be due to diverticulitis. Notably, there is a  small amount of extraluminal gas suspected, immediately adjacent to the affected  segment of sigmoid colon. This may represent a localized perforation. 2. No evidence of abscess or bowel obstruction. 3. Diffuse fatty infiltration of the liver. FINDINGS:   The visualized thyroid gland is unremarkable. The aorta and main pulmonary  artery are normal in caliber. There is coronary artery atherosclerosis. The  heart size is normal.  There is no pericardial effusion.       There are no enlarged axillary, mediastinal, or hilar lymph nodes.      There are minimal subpleural reticular opacities in the lower lungs. There is  minimal left basilar scarring or atelectasis. There is no suspicious lung  nodule, mass, or consolidation.     In the limited images of the upper abdomen, the partially imaged solid organs  are unremarkable. Degenerative changes in the spine. There is no aggressive bony  lesion.     IMPRESSION  IMPRESSION:   Minimal interstitial changes in the lower lungs with minimal left basilar  scarring or atelectasis. No evidence for atypical infection or other acute  abnormality. TTE  Left Ventricle Normal cavity size, wall thickness, systolic function (ejection fraction normal) and diastolic function. The muscle mass is normal. The cavity shape is normal. The estimated ejection fraction is 56 - 60%.  No regional wall motion abnormality noted. End-systolic volume is normal. Normal left ventricular strain. Normal left ventricular diastolic pressure. End-diastolic volume is normal.   Wall Scoring The left ventricular wall motion is normal.            Left Atrium Normal cavity size. No atrial septal defect present. Right Ventricle Normal cavity size, wall thickness and global systolic function. Right Atrium Normal cavity size. Interatrial Septum No atrial septal defect present. No interatrial septal aneurysm present. .   Aortic Valve Normal valve structure, trileaflet valve structure, no stenosis and no regurgitation. Mitral Valve Normal valve structure, no stenosis and no regurgitation. Tricuspid Valve Normal valve structure, no stenosis and no regurgitation. Pulmonic Valve Pulmonic valve not well visualized. Normal valve structure, no stenosis and no regurgitation. Aorta Normal aortic root, ascending aortic, and aortic arch. IVC/Hepatic Veins Mildly elevated central venous pressure (5-10 mmHg); IVC diameter is less than 21 mm and collapses less than 50% with respiration. CT ABD 11/18/19  FINDINGS:   LUNG BASES: Small bilateral pleural effusions are present increase in interval.  Bibasilar volume loss is present. INCIDENTALLY IMAGED HEART AND MEDIASTINUM: Unremarkable. LIVER: The left hepatic lobe segment 2, again noted is a lobulated hypodense  lesion which demonstrates possible mild peripheral enhancement. Overall lesion  size is not significantly changed although there may be some increased edema  around the lesion. GALLBLADDER: Unremarkable. SPLEEN: No mass. PANCREAS: No mass or ductal dilatation. ADRENALS: Unremarkable. KIDNEYS: No mass, calculus, or hydronephrosis. STOMACH: Unremarkable. SMALL BOWEL: No dilatation or wall thickening. COLON: Colonic wall thickening and mild pericolonic inflammation is noted in the  sigmoid colon compatible with diverticulitis.  No evidence of perforation or  abscess formation. APPENDIX: Unremarkable. PERITONEUM: No significant free fluid is identified. No lymphadenopathy in the  peritoneum. RETROPERITONEUM: Mildly prominent retroperitoneal lymph nodes measuring up to 11  mm in size not significantly changed. REPRODUCTIVE ORGANS: Unremarkable  URINARY BLADDER: Nondistended and mildly thickened  BONES: No destructive bone lesion. ADDITIONAL COMMENTS: N/A     IMPRESSION  IMPRESSION:  1. Lobulated hypodense liver lesion without significant change in size. Possible increased surrounding edema. . This is not entirely specific however  previous biopsy demonstrated probable infection.     2. No evidence of diverticular abscess or perforation. Persistent colonic wall  thickening and mild pericolonic inflammation of the sigmoid colon. No  significant free fluid.     3.  Other incidental findings as described      Carotid US  Right Carotid     There is no stenosis in the right CCA. There is mild stenosis in the right ICA (<50%). The right ICA has mixed density plaque. There is no stenosis in the right ECA. The right vertebral is antegrade. Left Carotid     There is no stenosis in the left CCA. There is mild stenosis in the left ICA (<50%). The left ICA has mixed density plaque. There is no stenosis in the left ECA. The left vertebral is antegrade. LATANYA 11/20/19  Findings: no conclusive evidence for vegetations on valves  Probable right atrial mass attached to lateral wall the nature of which is uncertain        Procedures:     22/74/75  Uncomplicated CT-guided left lobe liver mass biopsy and aspiration      Assessment / Plan:      Mr Jeet Gross is a 78-year-old gentleman reports no significant past medical history admitted on 11/12/2019 with abdominal pain and diarrhea. He reports having a [birth year] on [birth year] and made a large pot of spaghetti. That evening he noticed he had explosive diarrhea.   Started experiencing abdominal pain and started feeling very sick and therefore presented to the hospital.  Found to have diverticulitis with microperforation and work-up also showed liver abscess/mass . He had this drained by CT-guided biopsy/drainage on 11/13/2019. He says he feels better but still very tired and recuperating from his recent illness. Says diarrhea is resolved . Denies any fevers chills or night sweats ongoing . Has started eating. Overall improved . Tolerating antibiotics without any issues at this time. From chart review, his T-max is 99.9. He has leukocytosis that is uptrending to 18.2. His renal function is normal.  His LFTs are elevated but trending down. 1) Fusobacterium bacteremia   Usually seen in oral/dental, deep neck infections  He denied any dental or neck pain  Seeding suspected from liver abscess   TTE noted   LATANYA without no conclusive evidence for vegetatios but R atrial mass, plan for Cardiac MRI, based on that , consult CT surgery    Noted Carotid US   Await repeat cx 11/17 so far negative   On Zosyn IV . Closer to discharge, will change to Ertapenem IV to transition for once a day antibiotics eventually at [facility] infusion center   Will need ID follow up arranged         2) Liver abscess   Status post drainage on 11/13/2019  Cultures pending but so far GNR anaerobic   IV  Zosyn for now   Recommend  Repeat CT in 2 weeks   Repeat CT with \" Lobulated hypodense liver lesion without significant change in size. Possible increased surrounding edema\" Surgery following and ? If amenable to drainage   Usually liver abscesses are treated until they have resolved for 4 to 6 weeks with clinical monitoring as well as radiographic monitoring to assess response to treatment    3) diverticulitis with microperforation  Evaluated by surgery  On IV Zosyn    4) DVT PX         Plans to go to [facility] last and return to [facility] per discussion with primary team       Thank for the opportunity to participate in the care of this patient. Please contact with questions or concerns. Nely Alvarado DO  11:45 AM

## 2019-11-22 ENCOUNTER — HOSPITAL ENCOUNTER (OUTPATIENT)
Dept: MRI IMAGING | Age: 59
Discharge: HOME | End: 2019-11-22
Attending: HOSPITALIST
Payer: MEDICAID

## 2019-11-22 LAB
ANION GAP SERPL CALC-SCNC: 6 MMOL/L (ref 5–15)
BACTERIA SPEC CULT: NORMAL
BASOPHILS # BLD: 0 K/UL (ref 0–0.1)
BASOPHILS NFR BLD: 0 % (ref 0–1)
BUN SERPL-MCNC: 12 MG/DL (ref 6–20)
BUN/CREAT SERPL: 12 (ref 12–20)
CALCIUM SERPL-MCNC: 8.8 MG/DL (ref 8.5–10.1)
CHLORIDE SERPL-SCNC: 102 MMOL/L (ref 97–108)
CHLORIDE UR-SCNC: 87 MMOL/L
CO2 SERPL-SCNC: 23 MMOL/L (ref 21–32)
COMMENT, HOLDF: NORMAL
CORTIS AM PEAK SERPL-MCNC: 21.6 UG/DL (ref 4.3–22.45)
CREAT SERPL-MCNC: 1.01 MG/DL (ref 0.7–1.3)
DIFFERENTIAL METHOD BLD: ABNORMAL
EOSINOPHIL # BLD: 0 K/UL (ref 0–0.4)
EOSINOPHIL NFR BLD: 0 % (ref 0–7)
ERYTHROCYTE [DISTWIDTH] IN BLOOD BY AUTOMATED COUNT: 13.4 % (ref 11.5–14.5)
GLUCOSE SERPL-MCNC: 128 MG/DL (ref 65–100)
HCT VFR BLD AUTO: 41.9 % (ref 36.6–50.3)
HGB BLD-MCNC: 13.7 G/DL (ref 12.1–17)
IMM GRANULOCYTES # BLD AUTO: 0 K/UL
IMM GRANULOCYTES NFR BLD AUTO: 0 %
LYMPHOCYTES # BLD: 1.9 K/UL (ref 0.8–3.5)
LYMPHOCYTES NFR BLD: 20 % (ref 12–49)
MCH RBC QN AUTO: 30 PG (ref 26–34)
MCHC RBC AUTO-ENTMCNC: 32.7 G/DL (ref 30–36.5)
MCV RBC AUTO: 91.7 FL (ref 80–99)
MONOCYTES # BLD: 0.5 K/UL (ref 0–1)
MONOCYTES NFR BLD: 5 % (ref 5–13)
NEUTS SEG # BLD: 6.9 K/UL (ref 1.8–8)
NEUTS SEG NFR BLD: 75 % (ref 32–75)
NRBC # BLD: 0 K/UL (ref 0–0.01)
NRBC BLD-RTO: 0 PER 100 WBC
PLATELET # BLD AUTO: 438 K/UL (ref 150–400)
PMV BLD AUTO: 9.4 FL (ref 8.9–12.9)
POTASSIUM SERPL-SCNC: 4.8 MMOL/L (ref 3.5–5.1)
RBC # BLD AUTO: 4.57 M/UL (ref 4.1–5.7)
RBC MORPH BLD: ABNORMAL
SAMPLES BEING HELD,HOLD: NORMAL
SERVICE CMNT-IMP: NORMAL
SODIUM SERPL-SCNC: 131 MMOL/L (ref 136–145)
WBC # BLD AUTO: 9.3 K/UL (ref 4.1–11.1)

## 2019-11-22 PROCEDURE — A9577 INJ MULTIHANCE: HCPCS | Performed by: INTERNAL MEDICINE

## 2019-11-22 PROCEDURE — C1751 CATH, INF, PER/CENT/MIDLINE: HCPCS

## 2019-11-22 PROCEDURE — 74011250636 HC RX REV CODE- 250/636: Performed by: INTERNAL MEDICINE

## 2019-11-22 PROCEDURE — 80048 BASIC METABOLIC PNL TOTAL CA: CPT

## 2019-11-22 PROCEDURE — B246ZZ4 ULTRASONOGRAPHY OF RIGHT AND LEFT HEART, TRANSESOPHAGEAL: ICD-10-PCS | Performed by: SPECIALIST

## 2019-11-22 PROCEDURE — 85025 COMPLETE CBC W/AUTO DIFF WBC: CPT

## 2019-11-22 PROCEDURE — 65270000032 HC RM SEMIPRIVATE

## 2019-11-22 PROCEDURE — 74011250637 HC RX REV CODE- 250/637: Performed by: INTERNAL MEDICINE

## 2019-11-22 PROCEDURE — 36573 INSJ PICC RS&I 5 YR+: CPT | Performed by: HOSPITALIST

## 2019-11-22 PROCEDURE — 76937 US GUIDE VASCULAR ACCESS: CPT

## 2019-11-22 PROCEDURE — 36415 COLL VENOUS BLD VENIPUNCTURE: CPT

## 2019-11-22 PROCEDURE — 77030020365 HC SOL INJ SOD CL 0.9% 50ML

## 2019-11-22 PROCEDURE — 82533 TOTAL CORTISOL: CPT

## 2019-11-22 PROCEDURE — 74011000258 HC RX REV CODE- 258: Performed by: INTERNAL MEDICINE

## 2019-11-22 PROCEDURE — 02HV33Z INSERTION OF INFUSION DEVICE INTO SUPERIOR VENA CAVA, PERCUTANEOUS APPROACH: ICD-10-PCS | Performed by: HOSPITALIST

## 2019-11-22 PROCEDURE — 75561 CARDIAC MRI FOR MORPH W/DYE: CPT

## 2019-11-22 RX ADMIN — Medication 1 CAPSULE: at 11:36

## 2019-11-22 RX ADMIN — PIPERACILLIN AND TAZOBACTAM 3.38 G: 3; .375 INJECTION, POWDER, FOR SOLUTION INTRAVENOUS at 11:36

## 2019-11-22 RX ADMIN — OXYCODONE AND ACETAMINOPHEN 1 TABLET: 5; 325 TABLET ORAL at 17:37

## 2019-11-22 RX ADMIN — GADOBENATE DIMEGLUMINE 28 ML: 529 INJECTION, SOLUTION INTRAVENOUS at 10:55

## 2019-11-22 RX ADMIN — OXYCODONE AND ACETAMINOPHEN 1 TABLET: 5; 325 TABLET ORAL at 02:49

## 2019-11-22 RX ADMIN — PIPERACILLIN AND TAZOBACTAM 3.38 G: 3; .375 INJECTION, POWDER, FOR SOLUTION INTRAVENOUS at 19:01

## 2019-11-22 RX ADMIN — PIPERACILLIN AND TAZOBACTAM 3.38 G: 3; .375 INJECTION, POWDER, FOR SOLUTION INTRAVENOUS at 04:28

## 2019-11-22 RX ADMIN — OXYCODONE AND ACETAMINOPHEN 1 TABLET: 5; 325 TABLET ORAL at 22:32

## 2019-11-22 RX ADMIN — Medication 10 ML: at 04:28

## 2019-11-22 RX ADMIN — OXYCODONE AND ACETAMINOPHEN 1 TABLET: 5; 325 TABLET ORAL at 13:32

## 2019-11-22 RX ADMIN — Medication 10 ML: at 20:32

## 2019-11-22 RX ADMIN — OXYCODONE AND ACETAMINOPHEN 1 TABLET: 5; 325 TABLET ORAL at 08:02

## 2019-11-22 NOTE — PROGRESS NOTES
BRANDO plan:    -CM following for transitions of care. -Initial CM assessment indicated patient had Medicare but he does not. Patient apparently has Medicaid but has not received his care yet, so he is currently uninsured.  -Orders written for [facility] for patient to receive Ertapenem. Cm contacted [facility] and they can accept starting on Sunday with an arrival time of 8:30 am. They will give him the other times on Sunday. Cm faxed the form to them, gave patient the original copy and placed a copy in the chart.     Stephanie GRADYW, ACM

## 2019-11-22 NOTE — PROGRESS NOTES
Bedside shift change report given to [address] (oncoming nurse) by Gil Valdez (offgoing nurse). Report included the following information SBAR, Kardex and MAR.

## 2019-11-22 NOTE — PROCEDURES
PICC Placement Note    PRE-PROCEDURE VERIFICATION  Correct Procedure: yes  Correct Site:  yes  Temperature: Temp: 97.9 °F (36.6 °C), Temperature Source: Temp Source: Oral  Recent Labs     11/22/19  0304   BUN 12   CREA 1.01   *   WBC 9.3     Allergies: Patient has no known allergies. Education materials, including PICC Booklet, for PICC Care given to patient: yes. See Patient Education activity for further details. PROCEDURE DETAIL  A single lumen PICC line was started for Home IV Therapy. The following documentation is in addition to the PICC properties in the lines/airways flowsheet :  Lot #: [id number]  Was xylocaine 1% used intradermally:  yes  Catheter Length: 40 (cm)  Vein Selection for PICC:right basilic  Central Line Bundle followed yes  Complication Related to Insertion: none    The placement was verified by ECG/Sapiens technology: The  tip location is on the right side and the tip is in the  superior vena cava. See ECG results for PICC tip placement. Report given to nurse Almita Zhou. Line is okay to use.     Sarika Slaughter RN

## 2019-11-22 NOTE — PROGRESS NOTES
Bedside and Verbal shift change report given to Des Trujillo RN (oncoming nurse) by Allan Grimaldo RN (offgoing nurse). Report included the following information SBAR, Kardex, Intake/Output, MAR, Accordion, Recent Results and Med Rec Status.

## 2019-11-22 NOTE — PROGRESS NOTES
Mr. Jeet Gross has no c/o today. Tm 98.6 HR: 101 BP: 142/86 Resp Rate: 18 96% sat on room air. Intake/Output Summary (Last 24 hours) at 11/22/2019 1706  Last data filed at 11/22/2019 1648  Gross per 24 hour   Intake    Output 1800 ml   Net -1800 ml   Exam: Cor: RRR. Lungs: Bilateral breath sounds. Clear to auscultation. Abd: Soft. Non tender. No guarding or rebound. Non distended. Labs: No results found for this or any previous visit (from the past 12 hour(s)). Cardiac MRI findings - noted. No pathologic right atrial masses visualized. IV abx - Zosyn per Dr. Vito Partida.  Diet as tolerated. Discharge planning for IV abx at [facility]. Plans per Dr. Kelsie Salinas. Will see as needed.

## 2019-11-22 NOTE — PROGRESS NOTES
ID note      Patient off the floor  [place] [facility]  Currently on Zosyn IV  Infusion form filled out and left on chart     Nely Alvarado,   10:17 AM

## 2019-11-22 NOTE — PROGRESS NOTES
Hospitalist Progress Note  Guilherme Richardson MD  Answering service: [phone] OR [phone] from in house phone        Date of Service:  2019  NAME:  Teri Scott III  :  1960  MRN:  [id number]  PCP: Anjum Goodrich MD          Admission Summary:     Joselin Pro is a 62 y.o. male who presented with diarrhea, abdominal pain    Interval history / Subjective:   -Patient is doing well. Cardiac MRI today, no right atrial pathology was detected. -       Assessment & Plan:     #Sepsis, sec to severe colonic diverticulitis with microperforation and liver abscess   -Sepsis features resolved  status post liver biopsy 2019 by IR  Blood cultures x2/4  Fuscobacterium. Carotid Doppler negative for thrombus  Repeat BC x 2 : NGTD  Liver biopsy and cultures:  anaerobic GNR, BL neg, final identification pending. Significantly elevated procalcitonin level at 47, repeat level is much improved to 16.4. Continue IV Zosyn, ID recommendations noted. Patient aware that he will require longer (4-6wks) of IV abx and until radiological resolution has been achieved. TTE neg for vegetations. CT abdomen pelvis on 1118 still with persistent be related and hypodense liver lesion without significant change with possible increased surrounding edema. No evidence of diverticular abscess or perforation  -LATANYA showed possible right atrial mass.   -Cardiac MRI did not show any right atrial pathology  - Lactic acidosis sec to sepsis  Resolved  -Patient will be on intravenous ertapenem as outpatient  -Ordered PICC line placement    Acute respiratory insufficiency, volume overload   Resolved, off supplemental oxygen. Hyponatremia: It was initially thought to be due to sepsis and dehydration. Hyponatremia persisted after the sepsis and dehydration have resolved. TSH on admission was normal.  Patient's euvolemic but his sodium remains to be low although has improved since admission.   Try fluid restriction    -Recurrent hypokalemia: resolved, replete prn    #elevated troponin  Likely due to sepsis/tachycardia/demand supply mismatch  Cards consult noted, no ACS as per cardiology. Echocardiogram noted, normal LVEF, cardio no follow-up with outpatient. No active CP, hx reported hx of CAD    -Morbid obesity: Weight loss recommended    Code status: Full Code    DVT prophylaxis: SCDs    Care Plan discussed with: Patient/Family, Nurse and Consultant Gen Surg    Disposition: Home with outpatient IV antibiotics expect discharge over the weekend. Hospital Problems  Date Reviewed: 11/20/2019          Codes Class Noted POA    * (Principal) Sepsis ([facility] [address] ICD-10-CM: A41.9  ICD-9-CM: 038.9, 995.91  11/17/2019 Yes        Liver abscess ICD-10-CM: K75.0  ICD-9-CM: 572.0  11/17/2019 Yes        [facility] ICD-10-CM: A41.50  ICD-9-CM: 038.40  11/17/2019 Yes        Diverticulitis of large intestine without bleeding ICD-10-CM: K57.32  ICD-9-CM: 562.11  11/17/2019 Yes        Hyponatremia ICD-10-CM: E87.1  ICD-9-CM: 276.1  11/12/2019 Yes                Review of Systems:   A comprehensive review of systems was negative. Physical Examination:     General appearance: Alert, awake, appears older than his stated age, comfortable  Eyes: conjunctivae/corneas clear. PERRL. Lungs: clear to auscultation bilaterally, no rales, wheezing or rhonchi  Heart: sinus tachycardia, no murmurs, no gallops  Abdomen: soft, NT, NG, NR. Mild right upper quadrant tenderness without rebound or guarding  Neurologic: AAOx3, able to move all extremities    Vital Signs:    Last 24hrs VS reviewed since prior progress note.  Most recent are:    Visit Vitals  /86   Pulse (!) 101   Temp 97.9 °F (36.6 °C)   Resp 18   Ht 5' 11\" (1.803 m)   Wt 99.4 kg (219 lb 3.2 oz)   SpO2 96%   BMI 30.57 kg/m²         Intake/Output Summary (Last 24 hours) at 11/22/2019 1726  Last data filed at 11/22/2019 1648  Gross per 24 hour   Intake  Output 1800 ml   Net -1800 ml        Tmax:  Temp (24hrs), Av.8 °F (36.6 °C), Min:97.7 °F (36.5 °C), Max:97.9 °F (36.6 °C)      Data Review:   Data reviewed by myself:  Xr Chest Pa Lat    Result Date: 2019  INDICATION:  febrile illness Exam: Chest 2 views. Comparison: 3/22/2017. Findings: Cardiomediastinal silhouette is normal. Pulmonary vasculature is slightly prominent and increased since the prior study. Mild interstitial prominence predominantly at the lung bases with minimal left basilar atelectasis. No pneumothorax or focal consolidation. . Chronic right-sided rib fractures. Impression: 1. Mild prominence of the central pulmonary vasculature with minimal interstitial prominence at the lung bases. Chyrl Burow developing mild interstitial edema as opposed to atypical infection     Ct Chest Wo Cont    Result Date: 2019  EXAM:  CT thorax without contrast INDICATION:  Cough. COMPARISON: Chest radiographs  TECHNIQUE: Helical CT of the chest is performed without intravenous contrast. Coronal and sagittal reformatted images are obtained. CT dose reduction was achieved through use of a standardized protocol tailored for this examination and automatic exposure control for dose modulation. Adaptive statistical iterative reconstruction (ASIR) was utilized. FINDINGS: The visualized thyroid gland is unremarkable. The aorta and main pulmonary artery are normal in caliber. There is coronary artery atherosclerosis. The heart size is normal.  There is no pericardial effusion. There are no enlarged axillary, mediastinal, or hilar lymph nodes. There are minimal subpleural reticular opacities in the lower lungs. There is minimal left basilar scarring or atelectasis. There is no suspicious lung nodule, mass, or consolidation. In the limited images of the upper abdomen, the partially imaged solid organs are unremarkable. Degenerative changes in the spine. There is no aggressive bony lesion.      IMPRESSION: Minimal interstitial changes in the lower lungs with minimal left basilar scarring or atelectasis. No evidence for atypical infection or other acute abnormality. Ct Abd Pelv Wo Cont    Result Date: 11/12/2019  EXAM: CT ABD PELV WO CONT INDICATION: abd pain COMPARISON: CT chest 11/12/2019 CONTRAST:  None. TECHNIQUE: Thin axial images were obtained through the abdomen and pelvis. Coronal and sagittal reconstructions were generated. Oral contrast was not administered. CT dose reduction was achieved through use of a standardized protocol tailored for this examination and automatic exposure control for dose modulation. The absence of intravenous contrast material reduces the sensitivity for evaluation of the solid parenchymal organs of the abdomen. FINDINGS: LUNG BASES: There is mild bibasilar atelectasis. INCIDENTALLY IMAGED HEART AND MEDIASTINUM: Unremarkable. LIVER: Diffuse fatty infiltration of the liver. GALLBLADDER: Unremarkable. SPLEEN: No mass. PANCREAS: No mass or ductal dilatation. ADRENALS: Unremarkable. KIDNEYS/URETERS: No mass, calculus, or hydronephrosis. STOMACH: Unremarkable. SMALL BOWEL: No bowel obstruction or perforation COLON: Extensive diverticulosis of the colon. There is bowel wall thickening of the sigmoid colon with extensive diverticula as well as pericolonic inflammation, but no focal fluid collections. There is also a small amount of gas adjacent to the affected loop of sigmoid colon, which may be extraluminal. There is no free intraperitoneal air. APPENDIX: Unremarkable. PERITONEUM: No ascites RETROPERITONEUM: No lymphadenopathy or aortic aneurysm. REPRODUCTIVE ORGANS: The prostate is noted. URINARY BLADDER: No mass or calculus. BONES: No destructive bone lesion. ADDITIONAL COMMENTS: There is degenerative disc disease which is most advanced at L4-L5. IMPRESSION: 1.  Concentric bowel wall thickening of a 7 cm segment of sigmoid colon, where there are extensive diverticula and mild pericolonic inflammation. This is consistent with colitis, which may be due to diverticulitis. Notably, there is a small amount of extraluminal gas suspected, immediately adjacent to the affected segment of sigmoid colon. This may represent a localized perforation. 2. No evidence of abscess or bowel obstruction. 3. Diffuse fatty infiltration of the liver. [facility]    Result Date: 11/12/2019  INDICATION:  Elevated LFTs COMPARISON:  CT scan earlier today FINDINGS: Limited right upper quadrant ultrasound was performed. The liver is diffusely increased in echogenicity and enlarged. Within the left lobe of the liver, there is an ill-defined, mixed echogenicity mass, measuring 5.6 x 5.5 x 3.6 cm. . The common bile duct is normal measuring 3 mm in diameter. The gallbladder is normal. The right kidney measures 11.8 cm in length. IMPRESSION: 1. Enlarged, echogenic liver, compatible with diffuse fatty infiltration. 2. Complex mass in the left lobe of the liver measuring 5.6 cm. 3. No biliary ductal dilatation. Further evaluation with liver mass protocol MRI or CT is recommended. Xr Chest Port    Result Date: 11/13/2019  PORTABLE CHEST RADIOGRAPH/S: 11/13/2019 4:53 AM Clinical history: Wheezing and dyspnea INDICATION:   wheeze, sob COMPARISON: 11/12/2019 FINDINGS: AP portable upright view of the chest demonstrates a stable  cardiopericardial silhouette. The lungs are adequately expanded. Minimal interstitial edema. No pleural effusion or pneumothorax. The osseous structures are unremarkable. Patient is on a cardiac monitor. IMPRESSION: Minimal interstitial edema. No significant change. .     No results found for: SDES  Lab Results   Component Value Date/Time    Culture result: NO GROWTH 5 DAYS 11/17/2019 10:30 AM    Culture result: NO GROWTH 2 DAYS, AEROBICALLY 11/13/2019 04:15 PM    Culture result: (A) 11/13/2019 04:15 PM     MODERATE ANAEROBIC GRAM NEGATIVE RODS ISOLATED . .. PLEASE REFER TO Rios Zabala [id number] FOR IDENTIFICATION     All Micro Results     Procedure Component Value Units Date/Time    CULTURE, BLOOD, PAIRED [[id number]] Collected:  11/17/19 1030    Order Status:  Completed Specimen:  Blood Updated:  11/22/19 0546     Special Requests: NO SPECIAL REQUESTS        Culture result: NO GROWTH 5 DAYS       CULTURE, ANAEROBIC [[id number]]  (Abnormal) Collected:  11/13/19 1600    Order Status:  Completed Specimen:  Abscess Updated:  11/18/19 1131     Special Requests: LIVER     Culture result:       MODERATE FUSOBACTERIUM SPECIES BETA LACTAMASE NEGATIVE          CULTURE, BLOOD, PAIRED [[id number]]  (Abnormal) Collected:  11/12/19 0803    Order Status:  Completed Specimen:  Blood Updated:  11/18/19 1019     Special Requests: NO SPECIAL REQUESTS        Culture result:       FUSOBACTERIUM SPECIES BETA LACTAMASE NEGATIVE GROWING IN 2 OF 4 BOTTLES DRAWN (SITES = L AC AND R AC)                  PRELIMINARY REPORT OF ANAEROBIC GRAM NEGATIVE RODS GROWING IN 2 OF 4 BOTTLES DRAWN CALLED TO AND READ BACK BY Lalla Felty, RN ON 11/17/19 AT 7936 TA.                  REMAINING BOTTLE(S) HAS/HAVE NO GROWTH IN 5 DAYS          CULTURE, Desirae Anselmo STAIN [[id number]]  (Abnormal) Collected:  11/13/19 1530    Order Status:  Completed Specimen:  Abscess Updated:  11/15/19 1416     Special Requests: LIVER        GRAM STAIN 3+ WBCS SEEN         NO ORGANISMS SEEN        Culture result: NO GROWTH IN 2 DAYS, AEROBICALLY            MODERATE ANAEROBIC GRAM NEGATIVE RODS ISOLATED . ... PLEASE REFER TO [facility] [id number], FOR IDENTIFICATION          CULTURE, Desirae Anselmo STAIN [[id number]]  (Abnormal) Collected:  11/13/19 1615    Order Status:  Completed Specimen:  Abscess Updated:  11/15/19 1414     Special Requests: LIVER        GRAM STAIN 2+ WBCS SEEN         NO ORGANISMS SEEN        Culture result: NO GROWTH 2 DAYS, AEROBICALLY            MODERATE ANAEROBIC GRAM NEGATIVE RODS ISOLATED . .. PLEASE REFER TO Kevin Vivas [id number] FOR IDENTIFICATION          CULTURE, TISSUE Jayla Peals STAIN [[id number]] Collected:  11/13/19 1600    Order Status:  Canceled Specimen:  Liver     CULTURE, BODY FLUID W Andrew Graham [[id number]] Collected:  11/13/19 1600    Order Status:  Canceled Specimen:  Miscellaneous Fluid     LEGIONELLA PNEUMOPHILA AG, URINE [[id number]] Collected:  11/12/19 1540    Order Status:  Completed Specimen:  Urine Updated:  11/13/19 1536     Source URINE        L pneumophila S1 Ag, urine NEGATIVE         Comment: (NOTE)  Presumptive negative for L. pneumophila serogroup 1 antigen in urine,  suggesting no recent or current infection. Legionnaires' disease  cannot be ruled out since other serogroups and species may also  cause disease. Performed At: [address] [id number]  Urbano Donnelly MD GE:[id number]         C. DIFFICILE AG & TOXIN A/B [[id number]] Collected:  11/12/19 1715    Order Status:  Canceled Specimen:  Stool     ENTERIC BACT. Cadence Cheng [[id number]] Collected:  11/12/19 1715    Order Status:  Canceled     URINE CULTURE HOLD SAMPLE [[id number]] Collected:  11/12/19 1540    Order Status:  Completed Specimen:  Urine from Serum Updated:  11/12/19 1551     Urine culture hold       URINE ON HOLD IN MICROBIOLOGY DEPT FOR 3 DAYS. IF UNPRESERVED URINE IS SUBMITTED, IT CANNOT BE USED FOR ADDITIONAL TESTING AFTER 24 HRS, RECOLLECTION WILL BE REQUIRED. INFLUENZA A & B AG (RAPID TEST) [[id number]] Collected:  11/12/19 1312    Order Status:  Completed Specimen:  Nasopharyngeal from Nasal washing Updated:  11/12/19 1333     Influenza A Antigen NEGATIVE         Influenza B Antigen NEGATIVE              Labs: reviewed by myself.      Recent Labs     11/22/19  0304 11/21/19  0203   WBC 9.3 9.0   HGB 13.7 12.9   HCT 41.9 39.6   * 385     Recent Labs     11/22/19  0304 11/21/19  0203 11/20/19  0317   * 130* 133*   K 4.8 5.0 4.4    100 103   CO2 23 24 24   BUN 12 9 7   CREA 1.01 0.87 0.80   * 105* 92   CA 8.8 8.3* 8.1*     No results for input(s): SGOT, GPT, ALT, AP, TBIL, TBILI, TP, ALB, GLOB, GGT, AML, LPSE in the last 72 hours. No lab exists for component: AMYP, HLPSE  No results for input(s): INR, PTP, APTT, INREXT, INREXT in the last 72 hours. No results for input(s): FE, TIBC, PSAT, FERR in the last 72 hours. No results found for: FOL, RBCF   No results for input(s): PH, PCO2, PO2 in the last 72 hours. No results for input(s): CPK, CKNDX, TROIQ in the last 72 hours.     No lab exists for component: CPKMB  Lab Results   Component Value Date/Time    Cholesterol, total 83 11/16/2019 03:12 AM    HDL Cholesterol 9 11/16/2019 03:12 AM    LDL, calculated 44.2 11/16/2019 03:12 AM    Triglyceride 149 11/16/2019 03:12 AM    CHOL/HDL Ratio 9.2 (H) 11/16/2019 03:12 AM     No results found for: GLUCPOC  Lab Results   Component Value Date/Time    Color DARK YELLOW 11/12/2019 03:40 PM    Appearance CLOUDY (A) 11/12/2019 03:40 PM    Specific gravity 1.023 11/12/2019 03:40 PM    pH (UA) 6.5 11/12/2019 03:40 PM    Protein 100 (A) 11/12/2019 03:40 PM    Glucose 100 (A) 11/12/2019 03:40 PM    Ketone TRACE (A) 11/12/2019 03:40 PM    Urobilinogen 4.0 (H) 11/12/2019 03:40 PM    Nitrites NEGATIVE  11/12/2019 03:40 PM    Leukocyte Esterase NEGATIVE  11/12/2019 03:40 PM    Epithelial cells FEW 11/12/2019 03:40 PM    Bacteria NEGATIVE  11/12/2019 03:40 PM    WBC 0-4 11/12/2019 03:40 PM    RBC 0-5 11/12/2019 03:40 PM         Medications Reviewed:     Current Facility-Administered Medications   Medication Dose Route Frequency    piperacillin-tazobactam (ZOSYN) 3.375 g in 0.9% sodium chloride (MBP/ADV) 100 mL  3.375 g IntraVENous Q8H    oxyCODONE-acetaminophen (PERCOCET) 5-325 mg per tablet 1 Tab  1 Tab Oral Q4H PRN    lactobac ac& pc-s.therm-b.anim (XIOMARA Q/RISAQUAD)  1 Cap Oral DAILY    albuterol-ipratropium (DUO-NEB) 2.5 MG-0.5 MG/3 ML  3 mL Nebulization Q4H PRN    acetaminophen (TYLENOL) tablet 650 mg  650 mg Oral Q6H PRN    ondansetron (ZOFRAN) injection 4 mg  4 mg IntraVENous Q8H PRN    sodium chloride (NS) flush 5-40 mL  5-40 mL IntraVENous Q8H    sodium chloride (NS) flush 5-40 mL  5-40 mL IntraVENous PRN     ______________________________________________________________________  EXPECTED LENGTH OF STAY: 4d 19h  ACTUAL LENGTH OF STAY:          10                 Avila Troncoso MD     Patient's emergency contacts:  Extended Emergency Contact Information  Primary Emergency Contact: None, Given  Home Phone: [phone]  Relation: Other Non-relative  Secondary Emergency Contact: Rachael Darby  Mobile Phone: [phone]  Relation: Brother

## 2019-11-23 VITALS
HEIGHT: 71 IN | DIASTOLIC BLOOD PRESSURE: 54 MMHG | SYSTOLIC BLOOD PRESSURE: 90 MMHG | HEART RATE: 94 BPM | TEMPERATURE: 98.1 F | WEIGHT: 220.6 LBS | BODY MASS INDEX: 30.88 KG/M2 | RESPIRATION RATE: 18 BRPM | OXYGEN SATURATION: 97 %

## 2019-11-23 LAB
ANION GAP SERPL CALC-SCNC: 6 MMOL/L (ref 5–15)
BASOPHILS # BLD: 0.1 K/UL (ref 0–0.1)
BASOPHILS NFR BLD: 1 % (ref 0–1)
BUN SERPL-MCNC: 13 MG/DL (ref 6–20)
BUN/CREAT SERPL: 14 (ref 12–20)
CALCIUM SERPL-MCNC: 8.7 MG/DL (ref 8.5–10.1)
CHLORIDE SERPL-SCNC: 101 MMOL/L (ref 97–108)
CO2 SERPL-SCNC: 25 MMOL/L (ref 21–32)
CREAT SERPL-MCNC: 0.91 MG/DL (ref 0.7–1.3)
DIFFERENTIAL METHOD BLD: ABNORMAL
EOSINOPHIL # BLD: 0.2 K/UL (ref 0–0.4)
EOSINOPHIL NFR BLD: 2 % (ref 0–7)
ERYTHROCYTE [DISTWIDTH] IN BLOOD BY AUTOMATED COUNT: 13.4 % (ref 11.5–14.5)
GLUCOSE SERPL-MCNC: 112 MG/DL (ref 65–100)
HCT VFR BLD AUTO: 39.8 % (ref 36.6–50.3)
HGB BLD-MCNC: 12.6 G/DL (ref 12.1–17)
IMM GRANULOCYTES # BLD AUTO: 0.3 K/UL (ref 0–0.04)
IMM GRANULOCYTES NFR BLD AUTO: 3 % (ref 0–0.5)
LYMPHOCYTES # BLD: 1.7 K/UL (ref 0.8–3.5)
LYMPHOCYTES NFR BLD: 15 % (ref 12–49)
MCH RBC QN AUTO: 29.6 PG (ref 26–34)
MCHC RBC AUTO-ENTMCNC: 31.7 G/DL (ref 30–36.5)
MCV RBC AUTO: 93.6 FL (ref 80–99)
MONOCYTES # BLD: 0.8 K/UL (ref 0–1)
MONOCYTES NFR BLD: 7 % (ref 5–13)
NEUTS BAND NFR BLD MANUAL: 2 %
NEUTS SEG # BLD: 8.1 K/UL (ref 1.8–8)
NEUTS SEG NFR BLD: 70 % (ref 32–75)
NRBC # BLD: 0 K/UL (ref 0–0.01)
NRBC BLD-RTO: 0 PER 100 WBC
PLATELET # BLD AUTO: 438 K/UL (ref 150–400)
PLATELET COMMENTS,PCOM: ABNORMAL
PMV BLD AUTO: 9.2 FL (ref 8.9–12.9)
POTASSIUM SERPL-SCNC: 4.5 MMOL/L (ref 3.5–5.1)
RBC # BLD AUTO: 4.25 M/UL (ref 4.1–5.7)
RBC MORPH BLD: ABNORMAL
SODIUM SERPL-SCNC: 132 MMOL/L (ref 136–145)
WBC # BLD AUTO: 11.2 K/UL (ref 4.1–11.1)

## 2019-11-23 PROCEDURE — 80048 BASIC METABOLIC PNL TOTAL CA: CPT

## 2019-11-23 PROCEDURE — 74011250637 HC RX REV CODE- 250/637: Performed by: INTERNAL MEDICINE

## 2019-11-23 PROCEDURE — 74011250636 HC RX REV CODE- 250/636: Performed by: INTERNAL MEDICINE

## 2019-11-23 PROCEDURE — 74011000258 HC RX REV CODE- 258: Performed by: INTERNAL MEDICINE

## 2019-11-23 PROCEDURE — 36415 COLL VENOUS BLD VENIPUNCTURE: CPT

## 2019-11-23 PROCEDURE — 85025 COMPLETE CBC W/AUTO DIFF WBC: CPT

## 2019-11-23 RX ORDER — OXYCODONE AND ACETAMINOPHEN 5; 325 MG/1; MG/1
1 TABLET ORAL
Qty: 14 TAB | Refills: 0 | Status: SHIPPED | OUTPATIENT
Start: 2019-11-23 | End: 2019-11-26

## 2019-11-23 RX ADMIN — Medication 1 CAPSULE: at 09:14

## 2019-11-23 RX ADMIN — Medication 10 ML: at 06:07

## 2019-11-23 RX ADMIN — PIPERACILLIN AND TAZOBACTAM 3.38 G: 3; .375 INJECTION, POWDER, FOR SOLUTION INTRAVENOUS at 03:05

## 2019-11-23 RX ADMIN — OXYCODONE AND ACETAMINOPHEN 1 TABLET: 5; 325 TABLET ORAL at 08:13

## 2019-11-23 RX ADMIN — OXYCODONE AND ACETAMINOPHEN 1 TABLET: 5; 325 TABLET ORAL at 03:33

## 2019-11-23 NOTE — DISCHARGE INSTRUCTIONS
Smoking Cessation Program: This is a free, phone/text/email based, smoking cessation program. The program is individualized to meet each patient's needs. To enroll use the link - [facility]. [email or web address]/quit or text Katheryne Hodgkin to [phone] from any smart phone. Discharge Instructions       PATIENT ID: Ricki Awan III  MRN: [id number]   YOB: 1960    DATE OF ADMISSION: 11/12/2019 12:50 PM    DATE OF DISCHARGE: 11/23/2019    PRIMARY CARE PROVIDER: Joao Singh MD     ATTENDING PHYSICIAN: Jarrod Beth MD  DISCHARGING PROVIDER: Skyla Beltrán MD    To contact this individual call [phone] and ask the  to page. If unavailable ask to be transferred the Adult Hospitalist Department. DISCHARGE DIAGNOSES and CARE RECOMMENDATIONS:   Diverticulitis  Liver abscess  -You will receive intravenous antibiotics,Ertapenem 1 gm daily until 12/29/2019 at the [facility] infusion center. You will be followed by infectious disease Dr Ben Swain and make damian you talk with her before you finish the antibiotic therapy. Hyponatremia, low sodium. Do not dink excessive fluids in excess of 2-33 Liters. You need follow up with Lab work with your primary doctor.         DIET: Regular Diet      ACTIVITY: Activity as tolerated    WOUND CARE: PICC line care     SERVICES and EQUIPMENT needed: out patient antibiotics infusion     PENDING TEST RESULTS:   At the time of discharge the following test results are still pending: none    FOLLOW UP APPOINTMENTS:   Follow-up Information     Follow up With Specialties Details Why Contact Info    Joao Singh MD [address] [place] [address] [phone]      Tosha Jarrett DO Infectious Diseases   [address]  [address]  Manuel Su[place] 1636:   IP CONSULT TO GENERAL SURGERY  IP CONSULT TO CARDIOLOGY  IP CONSULT TO CARDIOLOGY  IP CONSULT TO INFECTIOUS DISEASES    YOU HAD THE FOLLOWING PROCEDURES/SURGERIES  :   * No surgery found *              DISCHARGE MEDICATIONS:   See Medication Reconciliation Form    · It is important that you take the medication exactly as they are prescribed. · Keep your medication in the bottles provided by the pharmacist and keep a list of the medication names, dosages, and times to be taken in your wallet. · Do not take other medications without consulting your doctor. NOTIFY YOUR PHYSICIAN FOR ANY OF THE FOLLOWING:   Fever over 101 degrees for 24 hours. Chest pain, shortness of breath, fever, chills, nausea, vomiting, diarrhea, change in mentation, falling, weakness, bleeding. Severe pain or pain not relieved by medications. Or, any other signs or symptoms that you may have questions about. Signed: Jacquelin Alcazar MD  11/23/2019  10:27 AM  Patient Education        Peripherally Inserted Central Catheter (PICC): Care Instructions  Your Care Instructions  A peripherally inserted central catheter (PICC) is a soft, flexible tube that runs under your skin from a vein in your arm to a large vein near your heart. One end of the catheter stays outside your body. It is a type of central venous catheter, or central venous line. You may have it for weeks or months. A PICC is used to give you medicine, blood products, nutrients, or fluids. A PICC makes doing these things more comfortable for you because they are put directly into the catheter. So you will not be stuck with a needle every time. A PICC may be used to draw blood for tests only if another vein, such as in the hand or arm, can't be used. The end of the PICC sometimes has two or three openings so that you can get more than one type of fluid or medicine at a time. Your doctor may give you medicine to make you feel relaxed. You may feel a little pain when your doctor numbs your arm.  Your doctor will then thread the catheter up a vein in your arm to a larger vein. You will not feel any pain. The doctor may use stitches or other devices to hold the catheter in place where it exits your arm. After the procedure, the site may be sore for a day or two. Follow-up care is a key part of your treatment and safety. Be sure to make and go to all appointments, and call your doctor if you are having problems. It's also a good idea to know your test results and keep a list of the medicines you take. How can you care for yourself at home? · Do not wear jewelry, such as necklaces, that can catch on the catheter. · If the catheter breaks, follow the instructions your doctor gave you. If you have no instructions, clamp or tie off the catheter. Then see a doctor as soon as possible. · To help prevent infection, take a shower instead of a bath. Do not go swimming with the catheter. · Try to keep the area dry. When you shower, cover the area with waterproof material, such as plastic wrap. · Never touch the open end of the catheter if the cap is off. · Never use scissors, knives, pins, or other sharp objects near the catheter or other tubing. · If your catheter has a clamp, keep it clamped when you are not using it. · Fasten or tape the catheter to your body to prevent pulling or dangling. · Avoid clothing that rubs or pulls on your catheter. · Avoid bending or crimping your catheter. · Always wash your hands before you touch your catheter. · Wear loose clothing over the catheter for the first 10 to 14 days. When getting dressed, be careful not to pull on the catheter. How to change the dressing  Since the PICC is in one of your arms, you will not be able to change the dressing on your own. You will need someone to help you change the dressing using the same instructions that your doctor or nurse gave you. Your PICC dressing should be changed at least once a week. If the dressing becomes loose, wet, or dirty, it must be changed more often to prevent infection.  Your doctor may also give you instructions for when to change the dressing. Be sure you have all your supplies ready. These include medical tape, a surgical mask, sterile gloves, and your dressing kit. The names and brands of the items will vary. Your doctor or nurse may give you specific instructions for changing the dressing. Here are basic tips for how to change the dressing. You will need help changing it. 1. Wash your hands with soap and water for 15 seconds. Dry your hands with paper towels. 2. Put on the surgical mask. 3. Loosen and remove your old dressing. Peel the dressing toward the PICC, not away from it. You may need to use an adhesive remover if your dressing does not come off easily. 4. Look at the site carefully for redness, swelling, drainage, tenderness, or warmth. If you notice any of these, call your doctor. 5. Wash your hands again, and open your dressing kit. Put on the sterile gloves. 6. Clean the site with the supplies in the dressing kit. 7. Use the dressing that your doctor gave you, and place it over the site. 8. Tape the PICC tubing to your skin so that it does not dangle or pull. When should you call for help? Call 911 anytime you think you may need emergency care. For example, call if:    · You passed out (lost consciousness).     · You have severe trouble breathing.     · You have sudden chest pain and shortness of breath, or you cough up blood.     · You have a fast or uneven pulse.    Call your doctor now or seek immediate medical care if:    · You have signs of infection, such as:  ? Increased pain, swelling, warmth, or redness. ? Red streaks leading from the area. ? Pus or blood draining from the area.   ? A fever.     · You have swelling in your face, chest, neck, or arm on the side where the catheter is.     · You have signs of a blood clot, such as bulging veins near the catheter.     · Your catheter is leaking, cracked, or clogged.     · You feel resistance when you inject medicine or fluids into your catheter.     · Your catheter is out of place. This may happen after severe coughing or vomiting, or if you pull on the catheter.     · You have chest pain or shortness of breath.    Watch closely for changes in your health, and be sure to contact your doctor if:    · You have any concerns about your catheter. Where can you learn more? Go to http://leonela-isaiah.info/. Enter Y820 in the search box to learn more about \"Peripherally Inserted Central Catheter (PICC): Care Instructions. \"  Current as of: June 26, 2019  Content Version: 12.2  © 8546-7257 [employer]. Care instructions adapted under license by [employer] (which disclaims liability or warranty for this information). If you have questions about a medical condition or this instruction, always ask your healthcare professional. [address] any warranty or liability for your use of this information.

## 2019-11-23 NOTE — PROGRESS NOTES
[phone]  Patient adamant that fluid restriction order was in error, Sanjay FRENCH called and discussed the rationale over the phone with the patient. PICC line placed and placement verified for immediate use. Bedside shift change report given to Angus Sharp (oncoming nurse) by Delmer Colindres RN (offgoing nurse). Report included the following information SBAR, Kardex and MAR.

## 2019-11-23 NOTE — PROGRESS NOTES
Bedside and Verbal shift change report given to Dex Galvin RN (oncoming nurse) by Adeel Kc RN (offgoing nurse). Report included the following information SBAR, Kardex, ED Summary, Intake/Output, MAR and Recent Results.

## 2019-11-24 ENCOUNTER — HOSPITAL ENCOUNTER (OUTPATIENT)
Dept: INFUSION THERAPY | Age: 59
Discharge: HOME | End: 2019-11-24
Payer: MEDICAID

## 2019-11-24 VITALS
TEMPERATURE: 98.2 F | SYSTOLIC BLOOD PRESSURE: 143 MMHG | HEART RATE: 108 BPM | RESPIRATION RATE: 18 BRPM | DIASTOLIC BLOOD PRESSURE: 90 MMHG

## 2019-11-24 PROCEDURE — 96365 THER/PROPH/DIAG IV INF INIT: CPT

## 2019-11-24 PROCEDURE — 74011250636 HC RX REV CODE- 250/636

## 2019-11-24 PROCEDURE — 74011000258 HC RX REV CODE- 258: Performed by: INTERNAL MEDICINE

## 2019-11-24 PROCEDURE — 74011250636 HC RX REV CODE- 250/636: Performed by: INTERNAL MEDICINE

## 2019-11-24 RX ORDER — HEPARIN 100 UNIT/ML
500 SYRINGE INTRAVENOUS AS NEEDED
Status: DISCONTINUED | OUTPATIENT
Start: 2019-11-24 | End: 2019-11-25 | Stop reason: HOSPADM

## 2019-11-24 RX ORDER — SODIUM CHLORIDE 9 MG/ML
10 INJECTION INTRAMUSCULAR; INTRAVENOUS; SUBCUTANEOUS AS NEEDED
Status: DISCONTINUED | OUTPATIENT
Start: 2019-11-24 | End: 2019-11-25 | Stop reason: HOSPADM

## 2019-11-24 RX ADMIN — SODIUM CHLORIDE 10 ML: 9 INJECTION INTRAMUSCULAR; INTRAVENOUS; SUBCUTANEOUS at 08:04

## 2019-11-24 RX ADMIN — Medication 500 UNITS: at 08:36

## 2019-11-24 RX ADMIN — SODIUM CHLORIDE 10 ML: 9 INJECTION INTRAMUSCULAR; INTRAVENOUS; SUBCUTANEOUS at 08:36

## 2019-11-24 RX ADMIN — ERTAPENEM SODIUM 1 G: 1 INJECTION, POWDER, LYOPHILIZED, FOR SOLUTION INTRAMUSCULAR; INTRAVENOUS at 08:04

## 2019-11-24 NOTE — DISCHARGE SUMMARY
Discharge Summary       PATIENT ID: Sandra Kaiser III  MRN: [id number]   YOB: 1960    DATE OF ADMISSION: 11/12/2019 12:50 PM    DATE OF DISCHARGE: 11/24/2019  PRIMARY CARE PROVIDER: Kylah Arceo MD     ATTENDING PHYSICIAN: Kiya Venegas MD  DISCHARGING PROVIDER: Kiya Venegas MD    To contact this individual call [phone] and ask the  to page. If unavailable ask to be transferred the Adult Hospitalist Department. CONSULTATIONS: IP CONSULT TO GENERAL SURGERY  IP CONSULT TO CARDIOLOGY  IP CONSULT TO INFECTIOUS DISEASES    PROCEDURES/SURGERIES: * No surgery found *    ADMITTING DIAGNOSES & HOSPITAL COURSE:     #Sepsis, sec to severe colonic diverticulitis with microperforation and liver abscess  Patient was admitted on 11/12 when he presented with abdominal pain. He had abdominal pain nausea vomiting and diarrhea since 4 days prior to presentation. He appeared to be septic at that time. Noncontrast CAT scan of the abdomen on admission showed sigmoid colitis with extraluminal gas suspicious for localized perforation. And he was admitted and treated with supportive therapy IV fluids pain control and of course intravenous antibiotics. A contrast abdominal CT was obtained the next day, 11/13 that showed a possible liver abscess and he underwent CT-guided aspiration and biopsy  The blood culture obtained on the day of admission grew Fusobacterium species beta-lactam is negative, subsequent cultures were negative. Culture of the hepatic fluid grew moderate and anaerobic gram-negative rods, which was identified as fusobacterium on a different specimen. A LATANYA looking for seeding infection and the heart valves, there was suspicious right atrial mass. He is subsequently underwent cardiac MRI which turned out to be negative.     General surgery, infectious disease and cardiology were consulted  And was treated with IV Zosyn until the day of discharge which was switched to ertapenem. Patient had a right arm PICC line placed and was discharged on ertapenem intravenously daily, he will be coming to [facility] outpatient infusion center. Acute respiratory insufficiency, volume overload   Resolved, off supplemental oxygen. Hyponatremia: It was initially thought to be due to sepsis and dehydration. Hyponatremia persisted after the sepsis and dehydration have resolved. TSH on admission was normal.  Patient's euvolemic but his sodium remains to be low although has improved since admission. Sodium slightly improved with mild fluid restriction. Patient is advised to follow-up with his primary doctor. Recurrent hypokalemia: resolved, replete prn     #Elevated troponin  Likely due to sepsis/tachycardia/demand supply mismatch  Cards consult noted, no ACS as per cardiology. Echocardiogram noted, normal LVEF, cardio no follow-up with outpatient. No active CP, hx reported hx of CAD     #Morbid obesity: Weight loss recommended          For days prior to discharge, patient was up and moving active ready to discharge however discharge was delayed due to the need for cardiac MRI where he could have been transferred over to [facility]. He is independent. He would like to drive to the outpatient infusion center for his antibiotics instead of having a home health come to his house. DISCHARGE MEDICATIONS:  Discharge Medication List as of 11/23/2019 10:37 AM      START taking these medications    Details   L. acidoph & paracasei- S therm- Bifido (XIOMARA-Q/RISAQUAD) 8 billion cell cap cap Take 1 Cap by mouth daily for 30 days. , Print, Disp-30 Cap, R-0      oxyCODONE-acetaminophen (PERCOCET) 5-325 mg per tablet Take 1 Tab by mouth every six (6) hours as needed for Pain for up to 3 days. Max Daily Amount: 4 Tabs., Print, Disp-14 Tab, R-0      ertapenem 1 gram 1 g IVPB 1 g by IntraVENous route every twenty-four (24) hours for 36 days. , No Print, Disp-30 Dose, R-0 PENDING TEST RESULTS:   At the time of discharge the following test results are still pending: none    FOLLOW UP APPOINTMENTS:    Follow-up Information     Follow up With Specialties Details Why Contact Info    Nguyễn Bustillos MD [facility]   [address] [facility]  [address]  [phone]      Vernell Redmond DO Infectious Diseases   [facility]  [address]  [address] [phone]             ADDITIONAL CARE RECOMMENDATIONS:     DIET: Regular Diet and Cardiac Diet    ACTIVITY: Activity as tolerated    WOUND CARE: NA    EQUIPMENT needed: NA          NOTIFY YOUR PHYSICIAN FOR ANY OF THE FOLLOWING:   Fever over 101 degrees for 24 hours. Chest pain, shortness of breath, fever, chills, nausea, vomiting, diarrhea, change in mentation, falling, weakness, bleeding. Severe pain or pain not relieved by medications. Or, any other signs or symptoms that you may have questions about.     DISPOSITION:  x  Home With:   OT  PT  HH  RN       SNF(Name)    Inpatient Rehab(name)    Independent/assisted living(name)    Hospice    Other:       PATIENT CONDITION AT DISCHARGE:     Functional status        Poor     Deconditioned    x Independent    Cognition       x Lucid    Forgetful    Dementia   Catheter/Lines(indications)     Contreras    PICC    PEG   x None   Code status     x Full    DNR       PHYSICAL EXAMINATION AT DISCHARGE:     Visit Vitals  BP 90/54 (BP 1 Location: Left arm, BP Patient Position: At rest;Supine)   Pulse 94   Temp 98.1 °F (36.7 °C)   Resp 18   Ht 5' 11\" (1.803 m)   Wt 100.1 kg (220 lb 9.6 oz)   SpO2 97%   BMI 30.77 kg/m²    O2 Flow Rate (L/min): 3 l/min O2 Device: Room air    Temp (24hrs), Av.1 °F (36.7 °C), Min:98.1 °F (36.7 °C), Max:98.1 °F (36.7 °C)    701 - 1900  In: -   Out: 222 [UCQEU:3795]   1901 - 700  In: -   Out: 9185 [Urine:1050]  Physical Examination:      General appearance: Alert, awake, appears older than his stated age, comfortable  Eyes: conjunctivae/corneas clear. PERRL. Lungs: clear to auscultation bilaterally, no rales, wheezing or rhonchi  Heart: sinus tachycardia, no murmurs, no gallops  Abdomen: soft, NT, NG, NR. Mild right upper quadrant tenderness without rebound or guarding  Neurologic: AAOx3, able to move all extremities        CHRONIC MEDICAL DIAGNOSES:  Problem List as of 11/23/2019 Date Reviewed: 11/20/2019          Codes Class Noted - Resolved    * (Principal) Sepsis ([facility] ICD-10-CM: A41.9  ICD-9-CM: 038.9, 995.91  11/17/2019 - Present        Liver abscess ICD-10-CM: K75.0  ICD-9-CM: 572.0  11/17/2019 - Present        Gram-neg septicemia ([facility] ICD-10-CM: A41.50  ICD-9-CM: 038.40  11/17/2019 - Present        Diverticulitis of large intestine without bleeding ICD-10-CM: K57.32  ICD-9-CM: 562.11  11/17/2019 - Present        Hyponatremia ICD-10-CM: E87.1  ICD-9-CM: 276.1  11/12/2019 - Present        Open dislocation of interphalangeal joint of fourth finger of left hand ICD-10-CM: XSZ0519  ICD-9-CM: Sallyanne Rattler  11/14/2015 - Present              Greater than 30 minutes were spent with the patient on counseling and coordination of care    Signed:    Claudia Prasad MD  11/24/2019  7:41 AM

## 2019-11-25 ENCOUNTER — HOSPITAL ENCOUNTER (OUTPATIENT)
Dept: INFUSION THERAPY | Age: 59
Discharge: HOME | End: 2019-11-25
Payer: MEDICAID

## 2019-11-25 VITALS
SYSTOLIC BLOOD PRESSURE: 144 MMHG | TEMPERATURE: 98.7 F | DIASTOLIC BLOOD PRESSURE: 83 MMHG | HEART RATE: 106 BPM | RESPIRATION RATE: 18 BRPM

## 2019-11-25 LAB
ALBUMIN SERPL-MCNC: 2.9 G/DL (ref 3.5–5)
ALBUMIN/GLOB SERPL: 0.6 {RATIO} (ref 1.1–2.2)
ALP SERPL-CCNC: 154 U/L (ref 45–117)
ALT SERPL-CCNC: 63 U/L (ref 12–78)
ANION GAP SERPL CALC-SCNC: 6 MMOL/L (ref 5–15)
AST SERPL-CCNC: 32 U/L (ref 15–37)
BASOPHILS # BLD: 0.1 K/UL (ref 0–0.1)
BASOPHILS NFR BLD: 1 % (ref 0–1)
BILIRUB DIRECT SERPL-MCNC: 0.1 MG/DL (ref 0–0.2)
BILIRUB SERPL-MCNC: 0.3 MG/DL (ref 0.2–1)
BUN SERPL-MCNC: 16 MG/DL (ref 6–20)
BUN/CREAT SERPL: 17 (ref 12–20)
CALCIUM SERPL-MCNC: 8.5 MG/DL (ref 8.5–10.1)
CHLORIDE SERPL-SCNC: 104 MMOL/L (ref 97–108)
CO2 SERPL-SCNC: 25 MMOL/L (ref 21–32)
CREAT SERPL-MCNC: 0.94 MG/DL (ref 0.7–1.3)
DIFFERENTIAL METHOD BLD: ABNORMAL
EOSINOPHIL # BLD: 0.1 K/UL (ref 0–0.4)
EOSINOPHIL NFR BLD: 1 % (ref 0–7)
ERYTHROCYTE [DISTWIDTH] IN BLOOD BY AUTOMATED COUNT: 13.3 % (ref 11.5–14.5)
GLOBULIN SER CALC-MCNC: 4.9 G/DL (ref 2–4)
GLUCOSE SERPL-MCNC: 161 MG/DL (ref 65–100)
HCT VFR BLD AUTO: 39.2 % (ref 36.6–50.3)
HGB BLD-MCNC: 12.2 G/DL (ref 12.1–17)
IMM GRANULOCYTES # BLD AUTO: 0.1 K/UL (ref 0–0.04)
IMM GRANULOCYTES NFR BLD AUTO: 1 % (ref 0–0.5)
LYMPHOCYTES # BLD: 1.4 K/UL (ref 0.8–3.5)
LYMPHOCYTES NFR BLD: 16 % (ref 12–49)
MCH RBC QN AUTO: 29.8 PG (ref 26–34)
MCHC RBC AUTO-ENTMCNC: 31.1 G/DL (ref 30–36.5)
MCV RBC AUTO: 95.6 FL (ref 80–99)
MONOCYTES # BLD: 0.6 K/UL (ref 0–1)
MONOCYTES NFR BLD: 7 % (ref 5–13)
NEUTS SEG # BLD: 6.2 K/UL (ref 1.8–8)
NEUTS SEG NFR BLD: 74 % (ref 32–75)
NRBC # BLD: 0 K/UL (ref 0–0.01)
NRBC BLD-RTO: 0 PER 100 WBC
PLATELET # BLD AUTO: 422 K/UL (ref 150–400)
PMV BLD AUTO: 9 FL (ref 8.9–12.9)
POTASSIUM SERPL-SCNC: 4.1 MMOL/L (ref 3.5–5.1)
PROT SERPL-MCNC: 7.8 G/DL (ref 6.4–8.2)
RBC # BLD AUTO: 4.1 M/UL (ref 4.1–5.7)
SODIUM SERPL-SCNC: 135 MMOL/L (ref 136–145)
WBC # BLD AUTO: 8.4 K/UL (ref 4.1–11.1)

## 2019-11-25 PROCEDURE — 74011000258 HC RX REV CODE- 258: Performed by: INTERNAL MEDICINE

## 2019-11-25 PROCEDURE — 96365 THER/PROPH/DIAG IV INF INIT: CPT

## 2019-11-25 PROCEDURE — 85025 COMPLETE CBC W/AUTO DIFF WBC: CPT

## 2019-11-25 PROCEDURE — 36415 COLL VENOUS BLD VENIPUNCTURE: CPT

## 2019-11-25 PROCEDURE — 80048 BASIC METABOLIC PNL TOTAL CA: CPT

## 2019-11-25 PROCEDURE — 74011250636 HC RX REV CODE- 250/636: Performed by: INTERNAL MEDICINE

## 2019-11-25 PROCEDURE — 80076 HEPATIC FUNCTION PANEL: CPT

## 2019-11-25 RX ORDER — HEPARIN 100 UNIT/ML
500 SYRINGE INTRAVENOUS AS NEEDED
Status: DISCONTINUED | OUTPATIENT
Start: 2019-11-25 | End: 2019-11-26 | Stop reason: HOSPADM

## 2019-11-25 RX ORDER — SODIUM CHLORIDE 0.9 % (FLUSH) 0.9 %
5-10 SYRINGE (ML) INJECTION AS NEEDED
Status: DISCONTINUED | OUTPATIENT
Start: 2019-11-25 | End: 2019-11-26 | Stop reason: HOSPADM

## 2019-11-25 RX ADMIN — Medication 10 ML: at 16:28

## 2019-11-25 RX ADMIN — ERTAPENEM SODIUM 1 G: 1 INJECTION, POWDER, LYOPHILIZED, FOR SOLUTION INTRAMUSCULAR; INTRAVENOUS at 16:35

## 2019-11-25 RX ADMIN — Medication 10 ML: at 17:06

## 2019-11-25 RX ADMIN — SODIUM CHLORIDE, PRESERVATIVE FREE 500 UNITS: 5 INJECTION INTRAVENOUS at 17:06

## 2019-11-26 ENCOUNTER — HOSPITAL ENCOUNTER (OUTPATIENT)
Dept: INFUSION THERAPY | Age: 59
Discharge: HOME | End: 2019-11-26
Payer: MEDICAID

## 2019-11-26 ENCOUNTER — TELEPHONE (OUTPATIENT)
Dept: FAMILY MEDICINE CLINIC | Age: 59
End: 2019-11-26

## 2019-11-26 VITALS
TEMPERATURE: 98 F | DIASTOLIC BLOOD PRESSURE: 97 MMHG | RESPIRATION RATE: 18 BRPM | HEART RATE: 102 BPM | SYSTOLIC BLOOD PRESSURE: 152 MMHG

## 2019-11-26 PROCEDURE — 74011250636 HC RX REV CODE- 250/636: Performed by: INTERNAL MEDICINE

## 2019-11-26 PROCEDURE — 74011000258 HC RX REV CODE- 258: Performed by: INTERNAL MEDICINE

## 2019-11-26 PROCEDURE — 74011250636 HC RX REV CODE- 250/636

## 2019-11-26 PROCEDURE — 96365 THER/PROPH/DIAG IV INF INIT: CPT

## 2019-11-26 RX ORDER — HEPARIN 100 UNIT/ML
500 SYRINGE INTRAVENOUS AS NEEDED
Status: DISCONTINUED | OUTPATIENT
Start: 2019-11-26 | End: 2019-11-27 | Stop reason: HOSPADM

## 2019-11-26 RX ORDER — SODIUM CHLORIDE 9 MG/ML
10 INJECTION INTRAMUSCULAR; INTRAVENOUS; SUBCUTANEOUS AS NEEDED
Status: DISCONTINUED | OUTPATIENT
Start: 2019-11-26 | End: 2019-11-27 | Stop reason: HOSPADM

## 2019-11-26 RX ADMIN — Medication 500 UNITS: at 16:07

## 2019-11-26 RX ADMIN — ERTAPENEM SODIUM 1 G: 1 INJECTION, POWDER, LYOPHILIZED, FOR SOLUTION INTRAMUSCULAR; INTRAVENOUS at 15:36

## 2019-11-26 RX ADMIN — SODIUM CHLORIDE 10 ML: 9 INJECTION INTRAMUSCULAR; INTRAVENOUS; SUBCUTANEOUS at 16:07

## 2019-11-26 RX ADMIN — SODIUM CHLORIDE 10 ML: 9 INJECTION INTRAMUSCULAR; INTRAVENOUS; SUBCUTANEOUS at 15:39

## 2019-11-26 NOTE — TELEPHONE ENCOUNTER
Patient came into the office upset after being released from the hospital today. Appointment made & given to the patient per Dr. Kellie Constantino  12/12/19 @ 1pm  Patient aware.     Joe Encarnacion LPN

## 2019-11-26 NOTE — PROGRESS NOTES
Outpatient Infusion Center Progress Note    1520 Pt admit to [facility] for Antibiotics ambulatory in stable condition. Assessment completed. No new concerns voiced. Visit Vitals  BP (!) 152/97   Pulse (!) 102   Temp 98 °F (36.7 °C)   Resp 18       Medications:  Medications Administered     0.9% sodium chloride injection 10 mL     Admin Date  11/26/2019 Action  Given Dose  10 mL Route  IntraVENous Administered By  Geri Esteves RN           Admin Date  11/26/2019 Action  Given Dose  10 mL Route  IntraVENous Administered By  Geri Esteves RN          ertapenem [facility]) 1 g in 0.9% sodium chloride (MBP/ADV) 50 mL     Admin Date  11/26/2019 Action  New Bag Dose  1 g Rate  100 mL/hr Route  IntraVENous Administered By  Geri Esteves RN          heparin (porcine) pf 500 Units     Admin Date  11/26/2019 Action  Given Dose  500 Units Route  IntraVENous Administered By  Geri Esteves RN                7918 Pt tolerated treatment well. D/c home ambulatory in no distress. Pt aware of next appointment scheduled for 11/27/19.

## 2019-11-26 NOTE — PROGRESS NOTES
Outpatient Infusion Center Progress Note    5496 Pt admit to [facility] for daily antibiotics ambulatory in stable condition. Assessment completed. No new concerns voiced. PICC flushed with positive blood return; labs drawn per order. Visit Vitals  /83   Pulse (!) 106   Temp 98.7 °F (37.1 °C)   Resp 18       Medications:  Medications Administered     ertapenem (INVANZ) 1 g in 0.9% sodium chloride (MBP/ADV) 50 mL     Admin Date  11/25/2019 Action  New Bag Dose  1 g Rate  100 mL/hr Route  IntraVENous Administered By  Flor Etienne RN          heparin (porcine) pf 500 Units     Admin Date  11/25/2019 Action  Given Dose  500 Units Route  IntraVENous Administered By  Flor Etienne RN          sodium chloride (NS) flush 5-10 mL     Admin Date  11/25/2019 Action  Given Dose  10 mL Route  IntraVENous Administered By  Flor Etienne RN           Admin Date  11/25/2019 Action  Given Dose  10 mL Route  IntraVENous Administered By  Flor Etienne RN                 7342 Pt tolerated treatment well. D/c home ambulatory in no distress.  Pt aware of next appointment scheduled for   Future Appointments   Date Time Provider Susan Ro   11/26/2019  4:00 PM [facility]   11/27/2019  4:00 PM [facility]   11/28/2019 10:30 AM [facility]   11/29/2019 10:00 AM [facility]   11/30/2019 10:00 AM [facility]   12/1/2019 10:00 AM [facility]   12/2/2019  4:00 PM [facility]   12/3/2019  4:00 PM [facility]   12/4/2019  4:00 PM [facility]   12/5/2019  4:00 PM [facility]   12/6/2019  4:00 PM [facility]   12/7/2019 10:00 AM [facility] NURSE 3 [facility]   12/8/2019 10:00 AM MARIAELENA INFUSION NURSE 3 [facility]         Please see pending labs in CC.

## 2019-11-27 ENCOUNTER — HOSPITAL ENCOUNTER (OUTPATIENT)
Dept: INFUSION THERAPY | Age: 59
Discharge: HOME | End: 2019-11-27
Payer: MEDICAID

## 2019-11-27 VITALS
RESPIRATION RATE: 18 BRPM | DIASTOLIC BLOOD PRESSURE: 82 MMHG | HEART RATE: 82 BPM | SYSTOLIC BLOOD PRESSURE: 128 MMHG | TEMPERATURE: 97.9 F

## 2019-11-27 PROCEDURE — 74011000258 HC RX REV CODE- 258: Performed by: INTERNAL MEDICINE

## 2019-11-27 PROCEDURE — 74011250636 HC RX REV CODE- 250/636: Performed by: INTERNAL MEDICINE

## 2019-11-27 PROCEDURE — 96365 THER/PROPH/DIAG IV INF INIT: CPT

## 2019-11-27 RX ADMIN — ERTAPENEM SODIUM 1 G: 1 INJECTION, POWDER, LYOPHILIZED, FOR SOLUTION INTRAMUSCULAR; INTRAVENOUS at 16:39

## 2019-11-27 NOTE — PROGRESS NOTES
Short Note    1640 Pt admit to [facility] ambulatory in stable condition. Assessment completed, no new complaints voiced. PICC line has blood return, flushed, pt connected to meds. Patient Vitals for the past 12 hrs:   Temp Pulse Resp BP   11/27/19 1642 97.9 °F (36.6 °C) 82 18 128/82     Medications Administered     ertapenem (INVANZ) 1 g in 0.9% sodium chloride (MBP/ADV) 50 mL     Admin Date  11/27/2019 Action  New Bag Dose  1 g Rate  100 mL/hr Route  IntraVENous Administered By  Hussein Hurst, BRENNA              Pt tolerated treatment well. PICC line flushed, heparinized and capped. Pt aware of next appointment; D/C ambulatory in no distress.

## 2019-11-28 ENCOUNTER — HOSPITAL ENCOUNTER (OUTPATIENT)
Dept: INFUSION THERAPY | Age: 59
Discharge: HOME | End: 2019-11-28
Payer: MEDICAID

## 2019-11-28 VITALS — SYSTOLIC BLOOD PRESSURE: 135 MMHG | DIASTOLIC BLOOD PRESSURE: 74 MMHG | RESPIRATION RATE: 16 BRPM | HEART RATE: 93 BPM

## 2019-11-28 PROCEDURE — 74011000258 HC RX REV CODE- 258: Performed by: INTERNAL MEDICINE

## 2019-11-28 PROCEDURE — 96374 THER/PROPH/DIAG INJ IV PUSH: CPT

## 2019-11-28 PROCEDURE — 96365 THER/PROPH/DIAG IV INF INIT: CPT

## 2019-11-28 PROCEDURE — 96375 TX/PRO/DX INJ NEW DRUG ADDON: CPT

## 2019-11-28 PROCEDURE — 74011250636 HC RX REV CODE- 250/636: Performed by: INTERNAL MEDICINE

## 2019-11-28 RX ORDER — HEPARIN 100 UNIT/ML
500 SYRINGE INTRAVENOUS AS NEEDED
Status: DISCONTINUED | OUTPATIENT
Start: 2019-11-28 | End: 2019-11-29 | Stop reason: HOSPADM

## 2019-11-28 RX ORDER — SODIUM CHLORIDE 0.9 % (FLUSH) 0.9 %
5-10 SYRINGE (ML) INJECTION AS NEEDED
Status: DISCONTINUED | OUTPATIENT
Start: 2019-11-28 | End: 2019-11-29 | Stop reason: HOSPADM

## 2019-11-28 RX ADMIN — Medication 500 UNITS: at 09:20

## 2019-11-28 RX ADMIN — ERTAPENEM SODIUM 1 G: 1 INJECTION, POWDER, LYOPHILIZED, FOR SOLUTION INTRAMUSCULAR; INTRAVENOUS at 08:43

## 2019-11-28 RX ADMIN — Medication 10 ML: at 09:20

## 2019-11-29 ENCOUNTER — HOSPITAL ENCOUNTER (OUTPATIENT)
Dept: INFUSION THERAPY | Age: 59
Discharge: HOME | End: 2019-11-29
Payer: MEDICAID

## 2019-11-29 VITALS
TEMPERATURE: 98.3 F | RESPIRATION RATE: 16 BRPM | HEART RATE: 92 BPM | DIASTOLIC BLOOD PRESSURE: 86 MMHG | SYSTOLIC BLOOD PRESSURE: 137 MMHG

## 2019-11-29 PROCEDURE — 74011250636 HC RX REV CODE- 250/636: Performed by: INTERNAL MEDICINE

## 2019-11-29 PROCEDURE — 96365 THER/PROPH/DIAG IV INF INIT: CPT

## 2019-11-29 PROCEDURE — 74011000258 HC RX REV CODE- 258: Performed by: INTERNAL MEDICINE

## 2019-11-29 RX ORDER — SODIUM CHLORIDE 0.9 % (FLUSH) 0.9 %
5-10 SYRINGE (ML) INJECTION AS NEEDED
Status: DISCONTINUED | OUTPATIENT
Start: 2019-11-29 | End: 2019-11-30 | Stop reason: HOSPADM

## 2019-11-29 RX ORDER — HEPARIN 100 UNIT/ML
500 SYRINGE INTRAVENOUS AS NEEDED
Status: DISCONTINUED | OUTPATIENT
Start: 2019-11-29 | End: 2019-11-30 | Stop reason: HOSPADM

## 2019-11-29 RX ADMIN — Medication 500 UNITS: at 08:20

## 2019-11-29 RX ADMIN — Medication 10 ML: at 08:20

## 2019-11-29 RX ADMIN — ERTAPENEM SODIUM 1 G: 1 INJECTION, POWDER, LYOPHILIZED, FOR SOLUTION INTRAMUSCULAR; INTRAVENOUS at 08:19

## 2019-11-29 NOTE — PROGRESS NOTES
[facility] Outpatient Infusion Center Note:  Pt arrived at [facility] ambulatory and in no distress for daily antibiotic    Assessment *stable no new complaints voiced. Dressing changed. Site good. Medications received:  Ertapenum     Tolerated treatment well, no adverse reaction noted. D/Cd from [facility] ambulatory and in no distress accompanied by self. Next appt 11/30  Visit Vitals  /86   Pulse 92   Temp 98.3 °F (36.8 °C)   Resp 16     No results found for this or any previous visit (from the past 12 hour(s)).

## 2019-11-30 ENCOUNTER — HOSPITAL ENCOUNTER (OUTPATIENT)
Dept: INFUSION THERAPY | Age: 59
Discharge: HOME | End: 2019-11-30
Payer: MEDICAID

## 2019-11-30 VITALS
HEART RATE: 80 BPM | SYSTOLIC BLOOD PRESSURE: 140 MMHG | RESPIRATION RATE: 18 BRPM | DIASTOLIC BLOOD PRESSURE: 91 MMHG | TEMPERATURE: 97.4 F

## 2019-11-30 PROCEDURE — 74011250636 HC RX REV CODE- 250/636: Performed by: INTERNAL MEDICINE

## 2019-11-30 PROCEDURE — 96365 THER/PROPH/DIAG IV INF INIT: CPT

## 2019-11-30 PROCEDURE — 74011000258 HC RX REV CODE- 258: Performed by: INTERNAL MEDICINE

## 2019-11-30 RX ADMIN — ERTAPENEM SODIUM 1 G: 1 INJECTION, POWDER, LYOPHILIZED, FOR SOLUTION INTRAMUSCULAR; INTRAVENOUS at 10:12

## 2019-11-30 NOTE — PROGRESS NOTES
1010 Pt admit to [facility] for [address] [facility] [address] ambulatory in stable condition. Assessment completed. No new concerns voiced. PICC with positive blood return. Visit Vitals  BP (!) 140/91   Pulse 80   Temp 97.4 °F (36.3 °C)   Resp 18       Medications:  Medications Administered     ertapenem (INVANZ) 1 g in 0.9% sodium chloride (MBP/ADV) 50 mL     Admin Date  11/30/2019 Action  New Bag Dose  1 g Rate  100 mL/hr Route  IntraVENous Administered By  Teo Sexton RN                  3303 Pt tolerated treatment well. PICC maintained positive blood return throughout treatment, flushed with positive blood return at conclusion and PICC heparinized and curos cap placed on end clave. D/c home ambulatory in no distress. Pt aware of next [facility] appointment scheduled for 12/1/19.

## 2019-12-01 ENCOUNTER — HOSPITAL ENCOUNTER (OUTPATIENT)
Dept: INFUSION THERAPY | Age: 59
Discharge: HOME | End: 2019-12-01
Payer: MEDICAID

## 2019-12-01 VITALS
RESPIRATION RATE: 18 BRPM | TEMPERATURE: 97.7 F | DIASTOLIC BLOOD PRESSURE: 87 MMHG | HEART RATE: 80 BPM | SYSTOLIC BLOOD PRESSURE: 159 MMHG

## 2019-12-01 PROCEDURE — 96365 THER/PROPH/DIAG IV INF INIT: CPT

## 2019-12-01 PROCEDURE — 74011250636 HC RX REV CODE- 250/636: Performed by: INTERNAL MEDICINE

## 2019-12-01 PROCEDURE — 74011000258 HC RX REV CODE- 258: Performed by: INTERNAL MEDICINE

## 2019-12-01 RX ADMIN — ERTAPENEM SODIUM 1 G: 1 INJECTION, POWDER, LYOPHILIZED, FOR SOLUTION INTRAMUSCULAR; INTRAVENOUS at 09:00

## 2019-12-01 NOTE — PROGRESS NOTES
Outpatient Infusion Center Short Visit Progress Note    0571 Pt admit to [facility] for daily Invanz ambulatory in stable condition. Assessment completed. No new concerns voiced. Single lumen PICC flushed with positive blood return. Patient Vitals for the past 12 hrs:   Temp Pulse Resp BP   12/01/19 0854 97.7 °F (36.5 °C) 80 18 159/87       Medications:  Invanz 1 g    0935 Pt tolerated treatment well. D/c home ambulatory in no distress. Pt aware of next appointment scheduled for 12/2/2019.

## 2019-12-02 ENCOUNTER — HOSPITAL ENCOUNTER (OUTPATIENT)
Dept: INFUSION THERAPY | Age: 59
Discharge: HOME | End: 2019-12-02
Payer: MEDICAID

## 2019-12-02 VITALS
RESPIRATION RATE: 16 BRPM | DIASTOLIC BLOOD PRESSURE: 83 MMHG | TEMPERATURE: 98 F | SYSTOLIC BLOOD PRESSURE: 141 MMHG | HEART RATE: 74 BPM

## 2019-12-02 LAB
ALBUMIN SERPL-MCNC: 3.1 G/DL (ref 3.5–5)
ALBUMIN/GLOB SERPL: 0.8 {RATIO} (ref 1.1–2.2)
ALP SERPL-CCNC: 144 U/L (ref 45–117)
ALT SERPL-CCNC: 40 U/L (ref 12–78)
ANION GAP SERPL CALC-SCNC: 4 MMOL/L (ref 5–15)
AST SERPL-CCNC: 28 U/L (ref 15–37)
BASOPHILS # BLD: 0 K/UL (ref 0–0.1)
BASOPHILS NFR BLD: 1 % (ref 0–1)
BILIRUB DIRECT SERPL-MCNC: 0.1 MG/DL (ref 0–0.2)
BILIRUB SERPL-MCNC: 0.3 MG/DL (ref 0.2–1)
BUN SERPL-MCNC: 12 MG/DL (ref 6–20)
BUN/CREAT SERPL: 12 (ref 12–20)
CALCIUM SERPL-MCNC: 8.7 MG/DL (ref 8.5–10.1)
CHLORIDE SERPL-SCNC: 105 MMOL/L (ref 97–108)
CO2 SERPL-SCNC: 28 MMOL/L (ref 21–32)
CREAT SERPL-MCNC: 0.98 MG/DL (ref 0.7–1.3)
DIFFERENTIAL METHOD BLD: ABNORMAL
EOSINOPHIL # BLD: 0.3 K/UL (ref 0–0.4)
EOSINOPHIL NFR BLD: 3 % (ref 0–7)
ERYTHROCYTE [DISTWIDTH] IN BLOOD BY AUTOMATED COUNT: 13 % (ref 11.5–14.5)
GLOBULIN SER CALC-MCNC: 4.1 G/DL (ref 2–4)
GLUCOSE SERPL-MCNC: 111 MG/DL (ref 65–100)
HCT VFR BLD AUTO: 40.2 % (ref 36.6–50.3)
HGB BLD-MCNC: 12.8 G/DL (ref 12.1–17)
IMM GRANULOCYTES # BLD AUTO: 0.1 K/UL (ref 0–0.04)
IMM GRANULOCYTES NFR BLD AUTO: 1 % (ref 0–0.5)
LYMPHOCYTES # BLD: 1.6 K/UL (ref 0.8–3.5)
LYMPHOCYTES NFR BLD: 20 % (ref 12–49)
MCH RBC QN AUTO: 29.4 PG (ref 26–34)
MCHC RBC AUTO-ENTMCNC: 31.8 G/DL (ref 30–36.5)
MCV RBC AUTO: 92.2 FL (ref 80–99)
MONOCYTES # BLD: 0.5 K/UL (ref 0–1)
MONOCYTES NFR BLD: 7 % (ref 5–13)
NEUTS SEG # BLD: 5.4 K/UL (ref 1.8–8)
NEUTS SEG NFR BLD: 68 % (ref 32–75)
NRBC # BLD: 0 K/UL (ref 0–0.01)
NRBC BLD-RTO: 0 PER 100 WBC
PLATELET # BLD AUTO: 286 K/UL (ref 150–400)
PMV BLD AUTO: 8.8 FL (ref 8.9–12.9)
POTASSIUM SERPL-SCNC: 4.5 MMOL/L (ref 3.5–5.1)
PROT SERPL-MCNC: 7.2 G/DL (ref 6.4–8.2)
RBC # BLD AUTO: 4.36 M/UL (ref 4.1–5.7)
SODIUM SERPL-SCNC: 137 MMOL/L (ref 136–145)
WBC # BLD AUTO: 7.8 K/UL (ref 4.1–11.1)

## 2019-12-02 PROCEDURE — 74011250636 HC RX REV CODE- 250/636: Performed by: INTERNAL MEDICINE

## 2019-12-02 PROCEDURE — 36592 COLLECT BLOOD FROM PICC: CPT

## 2019-12-02 PROCEDURE — 80076 HEPATIC FUNCTION PANEL: CPT

## 2019-12-02 PROCEDURE — 85025 COMPLETE CBC W/AUTO DIFF WBC: CPT

## 2019-12-02 PROCEDURE — 36415 COLL VENOUS BLD VENIPUNCTURE: CPT

## 2019-12-02 PROCEDURE — 96365 THER/PROPH/DIAG IV INF INIT: CPT

## 2019-12-02 PROCEDURE — 74011000258 HC RX REV CODE- 258: Performed by: INTERNAL MEDICINE

## 2019-12-02 PROCEDURE — 80048 BASIC METABOLIC PNL TOTAL CA: CPT

## 2019-12-02 RX ADMIN — ERTAPENEM SODIUM 1 G: 1 INJECTION, POWDER, LYOPHILIZED, FOR SOLUTION INTRAMUSCULAR; INTRAVENOUS at 09:25

## 2019-12-02 NOTE — PROGRESS NOTES
Short Visit Progress Note:     5005: Pt admit to [facility] for daily invanz ambulatory in stable condition. Assessment completed, no new concerns voiced. Single lumen PICC flushed with positive blood return; labs collected and sent for processing. Visit Vitals  /83 (BP 1 Location: Left arm, BP Patient Position: Sitting)   Pulse 74   Temp 98 °F (36.7 °C)   Resp 16       Medications Administered     ertapenem (INVANZ) 1 g in 0.9% sodium chloride (MBP/ADV) 50 mL     Admin Date  12/02/2019 Action  New Bag Dose  1 g Rate  100 mL/hr Route  IntraVENous Administered By  Racheal Overton RN              PICC line flushed, heparinized and capped. 0969 Pt tolerated treatment well. D/C home ambulatory in no distress.

## 2019-12-03 ENCOUNTER — HOSPITAL ENCOUNTER (OUTPATIENT)
Dept: INFUSION THERAPY | Age: 59
Discharge: HOME | End: 2019-12-03
Payer: MEDICAID

## 2019-12-03 VITALS
DIASTOLIC BLOOD PRESSURE: 81 MMHG | HEART RATE: 99 BPM | OXYGEN SATURATION: 95 % | RESPIRATION RATE: 16 BRPM | TEMPERATURE: 98.9 F | SYSTOLIC BLOOD PRESSURE: 132 MMHG

## 2019-12-03 PROCEDURE — 96365 THER/PROPH/DIAG IV INF INIT: CPT

## 2019-12-03 PROCEDURE — 74011000258 HC RX REV CODE- 258: Performed by: INTERNAL MEDICINE

## 2019-12-03 PROCEDURE — 74011250636 HC RX REV CODE- 250/636: Performed by: INTERNAL MEDICINE

## 2019-12-03 RX ORDER — OXYCODONE AND ACETAMINOPHEN 5; 325 MG/1; MG/1
1 TABLET ORAL
COMMUNITY

## 2019-12-03 RX ADMIN — ERTAPENEM SODIUM 1 G: 1 INJECTION, POWDER, LYOPHILIZED, FOR SOLUTION INTRAMUSCULAR; INTRAVENOUS at 15:59

## 2019-12-03 NOTE — PROGRESS NOTES
Outpatient Infusion Center Short Visit Progress Note    2957 Patient admitted to [facility] for [facility] in stable condition. Assessment completed. No new concerns voiced. Single lumen PICC flushed with positive blood return. Vital Signs:  Visit Vitals  /81 (BP 1 Location: Left arm, BP Patient Position: Sitting)   Pulse 99   Temp 98.9 °F (37.2 °C)   Resp 16   SpO2 95%     Medications:  Medications Administered     ertapenem (INVANZ) 1 g in 0.9% sodium chloride (MBP/ADV) 50 mL     Admin Date  12/03/2019 Action  New Bag Dose  1 g Rate  100 mL/hr Route  IntraVENous Administered By  Yovani Leos RN              1455 Patient tolerated treatment well. PICC flushed with positive blood return, heparinized, and capped. Patient discharged from [place] [address] ambulatory in no distress at 1630. Patient aware of next appointment.     Future Appointments   Date Time Provider Susan Ro   12/4/2019  4:00 PM [facility] INFUSION NURSE 3 [facility]    12/5/2019  4:00 PM [facility]   12/6/2019  4:00 PM [facility]   12/7/2019 10:00 AM [facility]   12/8/2019 10:00 AM [facility]   12/12/2019  1:00 PM Luis Alvarado, DO [address]

## 2019-12-04 ENCOUNTER — HOSPITAL ENCOUNTER (OUTPATIENT)
Dept: INFUSION THERAPY | Age: 59
Discharge: HOME | End: 2019-12-04
Payer: MEDICAID

## 2019-12-04 VITALS
HEART RATE: 90 BPM | TEMPERATURE: 98.5 F | DIASTOLIC BLOOD PRESSURE: 84 MMHG | SYSTOLIC BLOOD PRESSURE: 135 MMHG | RESPIRATION RATE: 16 BRPM

## 2019-12-04 PROCEDURE — 96365 THER/PROPH/DIAG IV INF INIT: CPT

## 2019-12-04 PROCEDURE — 74011000258 HC RX REV CODE- 258: Performed by: INTERNAL MEDICINE

## 2019-12-04 PROCEDURE — 74011250636 HC RX REV CODE- 250/636: Performed by: INTERNAL MEDICINE

## 2019-12-04 RX ORDER — SODIUM CHLORIDE 0.9 % (FLUSH) 0.9 %
10-40 SYRINGE (ML) INJECTION AS NEEDED
Status: DISCONTINUED | OUTPATIENT
Start: 2019-12-04 | End: 2019-12-05 | Stop reason: HOSPADM

## 2019-12-04 RX ORDER — HEPARIN 100 UNIT/ML
500 SYRINGE INTRAVENOUS AS NEEDED
Status: DISCONTINUED | OUTPATIENT
Start: 2019-12-04 | End: 2019-12-05 | Stop reason: HOSPADM

## 2019-12-04 RX ADMIN — ERTAPENEM SODIUM 1 G: 1 INJECTION, POWDER, LYOPHILIZED, FOR SOLUTION INTRAMUSCULAR; INTRAVENOUS at 16:27

## 2019-12-04 RX ADMIN — SODIUM CHLORIDE, PRESERVATIVE FREE 500 UNITS: 5 INJECTION INTRAVENOUS at 16:57

## 2019-12-04 RX ADMIN — Medication 10 ML: at 16:27

## 2019-12-04 RX ADMIN — Medication 20 ML: at 16:57

## 2019-12-04 NOTE — PROGRESS NOTES
[facility] Progress Note    Date: 2019    Name: Brenna Odell    MRN: [id number]         : 1960    Mr. Corcoran Arrived ambulatory and in no distress for daily Invanz. Assessment was completed, no acute issues at this time, no new complaints voiced. PICC flushed with positive blood return and Invanz started as ordered. Mr. Corcoran's vitals were reviewed. Patient Vitals for the past 12 hrs:   Temp Pulse Resp BP   19 1622 98.5 °F (36.9 °C) 90 16 135/84       Pre-medications  were administered as ordered and chemotherapy was initiated. Medications Administered     ertapenem (INVANZ) 1 g in 0.9% sodium chloride (MBP/ADV) 50 mL     Admin Date  2019 Action  New Bag Dose  1 g Rate  100 mL/hr Route  IntraVENous Administered By  Catherine Challengejuliana, RN          heparin (porcine) pf 500 Units     Admin Date  2019 Action  Given Dose  500 Units Route  IntraVENous Administered By  Catherine Challengejuliana, RN          sodium chloride (NS) flush 10-40 mL     Admin Date  2019 Action  Given Dose  10 mL Route  IntraVENous Administered By  Fusebill Challenger, RN           Admin Date  2019 Action  Given Dose  20 mL Route  IntraVENous Administered By  Fusebill Challenger, RN              Mr. Shahrzad Goldberg tolerated treatment well. PICC maintained positive blood return throughout treatment. PICC flushed, heparinized and capped per protocol. Patient was discharged from [place] [address] in stable condition at 1700.      Future Appointments   Date Time Provider Susan Ro   2019  4:00 PM [facility]   2019  4:00 PM [facility]   2019 10[facility]00 AM [facility]   2019 10:00 AM [facility]   2019  1:00 PM José Miguel Tellez  N Tova Sanders RN  2019

## 2019-12-05 ENCOUNTER — HOSPITAL ENCOUNTER (OUTPATIENT)
Dept: INFUSION THERAPY | Age: 59
Discharge: HOME | End: 2019-12-05
Payer: MEDICAID

## 2019-12-05 VITALS
HEART RATE: 83 BPM | TEMPERATURE: 98.1 F | RESPIRATION RATE: 16 BRPM | SYSTOLIC BLOOD PRESSURE: 126 MMHG | DIASTOLIC BLOOD PRESSURE: 81 MMHG

## 2019-12-05 PROCEDURE — 96365 THER/PROPH/DIAG IV INF INIT: CPT

## 2019-12-05 PROCEDURE — 74011000258 HC RX REV CODE- 258: Performed by: INTERNAL MEDICINE

## 2019-12-05 PROCEDURE — 74011250636 HC RX REV CODE- 250/636: Performed by: INTERNAL MEDICINE

## 2019-12-05 RX ADMIN — ERTAPENEM SODIUM 1 G: 1 INJECTION, POWDER, LYOPHILIZED, FOR SOLUTION INTRAMUSCULAR; INTRAVENOUS at 08:56

## 2019-12-06 ENCOUNTER — HOSPITAL ENCOUNTER (OUTPATIENT)
Dept: INFUSION THERAPY | Age: 59
Discharge: HOME | End: 2019-12-06
Payer: MEDICAID

## 2019-12-06 VITALS
TEMPERATURE: 98.6 F | DIASTOLIC BLOOD PRESSURE: 95 MMHG | SYSTOLIC BLOOD PRESSURE: 136 MMHG | RESPIRATION RATE: 18 BRPM | HEART RATE: 89 BPM

## 2019-12-06 PROCEDURE — 74011250636 HC RX REV CODE- 250/636: Performed by: INTERNAL MEDICINE

## 2019-12-06 PROCEDURE — 96365 THER/PROPH/DIAG IV INF INIT: CPT

## 2019-12-06 PROCEDURE — 77030020847 HC STATLOK BARD -A

## 2019-12-06 PROCEDURE — 74011000258 HC RX REV CODE- 258: Performed by: INTERNAL MEDICINE

## 2019-12-06 RX ADMIN — ERTAPENEM SODIUM 1 G: 1 INJECTION, POWDER, LYOPHILIZED, FOR SOLUTION INTRAMUSCULAR; INTRAVENOUS at 13:35

## 2019-12-06 NOTE — PROGRESS NOTES
Outpatient Infusion Center Short Visit Progress Note    3698 Pt admit to [facility] for daily Invanz ambulatory in stable condition. Assessment completed. No new concerns voiced. Single lumen PICC flushed w/ positive blood return; dressing changed per protocol. Patient Vitals for the past 12 hrs:   Temp Pulse Resp BP   12/06/19 1328 98.6 °F (37 °C) 89 18 (!) 136/95           Medications:  Invanz 1 g    1430 Pt tolerated treatment well. D/c home ambulatory in no distress. Pt aware of next appointment scheduled for 12/07/2019.

## 2019-12-07 ENCOUNTER — HOSPITAL ENCOUNTER (EMERGENCY)
Age: 59
Discharge: HOME | End: 2019-12-07
Attending: EMERGENCY MEDICINE | Admitting: EMERGENCY MEDICINE
Payer: MEDICAID

## 2019-12-07 VITALS
WEIGHT: 230 LBS | TEMPERATURE: 98.2 F | BODY MASS INDEX: 31.15 KG/M2 | OXYGEN SATURATION: 97 % | SYSTOLIC BLOOD PRESSURE: 132 MMHG | RESPIRATION RATE: 20 BRPM | HEIGHT: 72 IN | HEART RATE: 101 BPM | DIASTOLIC BLOOD PRESSURE: 85 MMHG

## 2019-12-07 DIAGNOSIS — Z79.2 ENCOUNTER FOR LONG-TERM (CURRENT) USE OF ANTIBIOTICS: Primary | ICD-10-CM

## 2019-12-07 PROCEDURE — 99282 EMERGENCY DEPT VISIT SF MDM: CPT

## 2019-12-07 PROCEDURE — 74011000258 HC RX REV CODE- 258: Performed by: INTERNAL MEDICINE

## 2019-12-07 PROCEDURE — 96365 THER/PROPH/DIAG IV INF INIT: CPT

## 2019-12-07 PROCEDURE — 74011250636 HC RX REV CODE- 250/636: Performed by: INTERNAL MEDICINE

## 2019-12-07 RX ADMIN — ERTAPENEM SODIUM 1 G: 1 INJECTION, POWDER, LYOPHILIZED, FOR SOLUTION INTRAMUSCULAR; INTRAVENOUS at 16:22

## 2019-12-07 NOTE — ED PROVIDER NOTES
Patient is a 56-year-old male who is currently being treated in the infusion center for an intra-abdominal and from perforated diverticulitis and liver abscess. He gets daily ERtapenem. Today he went to the infusion center with the assumption that it closed at 4 PM.  States he did not realize he had a 10 AM appointment. When he got to the infusion center was closed. Presents to the emergency department for antibiotic infusion. He has no acute complaints. No fevers and chills. Past Medical History:   Diagnosis Date    Knee pain        Past Surgical History:   Procedure Laterality Date    HX GI      hernia repair    HX HEENT      tonsils removed    HX ORTHOPAEDIC      right knee surgery         History reviewed. No pertinent family history.     Social History     Socioeconomic History    Marital status: SINGLE     Spouse name: Not on file    Number of children: Not on file    Years of education: Not on file    Highest education level: Not on file   Occupational History    Not on file   Social Needs    Financial resource strain: Not on file    Food insecurity:     Worry: Not on file     Inability: Not on file    Transportation needs:     Medical: Not on file     Non-medical: Not on file   Tobacco Use    Smoking status: Current Every Day Smoker     Packs/day: 0.50    Smokeless tobacco: Never Used   Substance and Sexual Activity    Alcohol use: Yes     Comment: occ    Drug use: No    Sexual activity: Not on file   Lifestyle    Physical activity:     Days per week: Not on file     Minutes per session: Not on file    Stress: Not on file   Relationships    Social connections:     Talks on phone: Not on file     Gets together: Not on file     Attends Hoahaoism service: Not on file     Active member of club or organization: Not on file     Attends meetings of clubs or organizations: Not on file     Relationship status: Not on file    Intimate partner violence:     Fear of current or ex partner: Not on file     Emotionally abused: Not on file     Physically abused: Not on file     Forced sexual activity: Not on file   Other Topics Concern    Not on file   Social History Narrative    Not on file         ALLERGIES: Patient has no known allergies. Review of Systems   Constitutional: Negative for chills and fever. Gastrointestinal: Negative for abdominal pain. All other systems reviewed and are negative. Vitals:    12/07/19 1555   BP: 132/85   Pulse: (!) 101   Resp: 20   Temp: 98.2 °F (36.8 °C)   SpO2: 97%   Weight: 104.3 kg (230 lb)   Height: 6' (1.829 m)            Physical Exam  Vitals signs and nursing note reviewed. Constitutional:       Appearance: Normal appearance. Pulmonary:      Effort: Pulmonary effort is normal.   Abdominal:      General: Abdomen is flat. Palpations: Abdomen is soft. Tenderness: There is no tenderness. Skin:     General: Skin is warm and dry. Neurological:      Mental Status: He is alert and oriented to person, place, and time. Psychiatric:         Mood and Affect: Mood normal.         Behavior: Behavior normal.         Thought Content: Thought content normal.          MDM  Number of Diagnoses or Management Options  Diagnosis management comments: Patient presents for infusion of ertapenem that he normally gets the infusion center. He has no acute complaints. Antibiotics ordered for infusion and will discharge home. He has his next appointment in the infusion center at 10 AM.  This has been reiterated to him on several occasions.           Procedures

## 2019-12-07 NOTE — ED TRIAGE NOTES
Pt arrives stating \"I tried to go to outpatient infusion for my medication. I have a PICC line and i'm supposed to get my daily medication. \"

## 2019-12-07 NOTE — ED NOTES
Discharge instructions given to patient by NP. Pt has been given counseling and verbalizes understanding. Pt to follow up with outpatient infusion tomorrow at 10am. Pt ambulated off of unit in no signs of distress.

## 2019-12-08 ENCOUNTER — HOSPITAL ENCOUNTER (OUTPATIENT)
Dept: INFUSION THERAPY | Age: 59
Discharge: HOME | End: 2019-12-08
Payer: MEDICAID

## 2019-12-08 VITALS
RESPIRATION RATE: 20 BRPM | HEART RATE: 99 BPM | TEMPERATURE: 98.7 F | SYSTOLIC BLOOD PRESSURE: 157 MMHG | DIASTOLIC BLOOD PRESSURE: 97 MMHG

## 2019-12-08 PROCEDURE — 74011250636 HC RX REV CODE- 250/636: Performed by: INTERNAL MEDICINE

## 2019-12-08 PROCEDURE — 74011000258 HC RX REV CODE- 258: Performed by: INTERNAL MEDICINE

## 2019-12-08 PROCEDURE — 96365 THER/PROPH/DIAG IV INF INIT: CPT

## 2019-12-08 RX ADMIN — ERTAPENEM SODIUM 1 G: 1 INJECTION, POWDER, LYOPHILIZED, FOR SOLUTION INTRAMUSCULAR; INTRAVENOUS at 09:29

## 2019-12-09 ENCOUNTER — HOSPITAL ENCOUNTER (OUTPATIENT)
Dept: INFUSION THERAPY | Age: 59
Discharge: HOME | End: 2019-12-09
Payer: MEDICAID

## 2019-12-09 VITALS
DIASTOLIC BLOOD PRESSURE: 75 MMHG | RESPIRATION RATE: 18 BRPM | HEART RATE: 88 BPM | SYSTOLIC BLOOD PRESSURE: 121 MMHG | TEMPERATURE: 98.3 F

## 2019-12-09 LAB
ALBUMIN SERPL-MCNC: 3.3 G/DL (ref 3.5–5)
ALBUMIN/GLOB SERPL: 0.9 {RATIO} (ref 1.1–2.2)
ALP SERPL-CCNC: 140 U/L (ref 45–117)
ALT SERPL-CCNC: 33 U/L (ref 12–78)
ANION GAP SERPL CALC-SCNC: 7 MMOL/L (ref 5–15)
AST SERPL-CCNC: 17 U/L (ref 15–37)
BASOPHILS # BLD: 0 K/UL (ref 0–0.1)
BASOPHILS NFR BLD: 0 % (ref 0–1)
BILIRUB DIRECT SERPL-MCNC: 0.1 MG/DL (ref 0–0.2)
BILIRUB SERPL-MCNC: 0.3 MG/DL (ref 0.2–1)
BUN SERPL-MCNC: 16 MG/DL (ref 6–20)
BUN/CREAT SERPL: 19 (ref 12–20)
CALCIUM SERPL-MCNC: 8.6 MG/DL (ref 8.5–10.1)
CHLORIDE SERPL-SCNC: 106 MMOL/L (ref 97–108)
CO2 SERPL-SCNC: 25 MMOL/L (ref 21–32)
CREAT SERPL-MCNC: 0.85 MG/DL (ref 0.7–1.3)
DIFFERENTIAL METHOD BLD: ABNORMAL
EOSINOPHIL # BLD: 0.3 K/UL (ref 0–0.4)
EOSINOPHIL NFR BLD: 4 % (ref 0–7)
ERYTHROCYTE [DISTWIDTH] IN BLOOD BY AUTOMATED COUNT: 12.9 % (ref 11.5–14.5)
GLOBULIN SER CALC-MCNC: 3.8 G/DL (ref 2–4)
GLUCOSE SERPL-MCNC: 154 MG/DL (ref 65–100)
HCT VFR BLD AUTO: 39.3 % (ref 36.6–50.3)
HGB BLD-MCNC: 12.8 G/DL (ref 12.1–17)
IMM GRANULOCYTES # BLD AUTO: 0 K/UL (ref 0–0.04)
IMM GRANULOCYTES NFR BLD AUTO: 1 % (ref 0–0.5)
LYMPHOCYTES # BLD: 1.6 K/UL (ref 0.8–3.5)
LYMPHOCYTES NFR BLD: 19 % (ref 12–49)
MCH RBC QN AUTO: 30 PG (ref 26–34)
MCHC RBC AUTO-ENTMCNC: 32.6 G/DL (ref 30–36.5)
MCV RBC AUTO: 92.3 FL (ref 80–99)
MONOCYTES # BLD: 0.5 K/UL (ref 0–1)
MONOCYTES NFR BLD: 6 % (ref 5–13)
NEUTS SEG # BLD: 6.1 K/UL (ref 1.8–8)
NEUTS SEG NFR BLD: 70 % (ref 32–75)
NRBC # BLD: 0 K/UL (ref 0–0.01)
NRBC BLD-RTO: 0 PER 100 WBC
PLATELET # BLD AUTO: 248 K/UL (ref 150–400)
PMV BLD AUTO: 9 FL (ref 8.9–12.9)
POTASSIUM SERPL-SCNC: 4.1 MMOL/L (ref 3.5–5.1)
PROT SERPL-MCNC: 7.1 G/DL (ref 6.4–8.2)
RBC # BLD AUTO: 4.26 M/UL (ref 4.1–5.7)
SODIUM SERPL-SCNC: 138 MMOL/L (ref 136–145)
WBC # BLD AUTO: 8.6 K/UL (ref 4.1–11.1)

## 2019-12-09 PROCEDURE — 80076 HEPATIC FUNCTION PANEL: CPT

## 2019-12-09 PROCEDURE — 85025 COMPLETE CBC W/AUTO DIFF WBC: CPT

## 2019-12-09 PROCEDURE — 74011000258 HC RX REV CODE- 258: Performed by: INTERNAL MEDICINE

## 2019-12-09 PROCEDURE — 36415 COLL VENOUS BLD VENIPUNCTURE: CPT

## 2019-12-09 PROCEDURE — 96365 THER/PROPH/DIAG IV INF INIT: CPT

## 2019-12-09 PROCEDURE — 80048 BASIC METABOLIC PNL TOTAL CA: CPT

## 2019-12-09 PROCEDURE — 74011250636 HC RX REV CODE- 250/636: Performed by: INTERNAL MEDICINE

## 2019-12-09 RX ADMIN — ERTAPENEM SODIUM 1 G: 1 INJECTION, POWDER, LYOPHILIZED, FOR SOLUTION INTRAMUSCULAR; INTRAVENOUS at 15:47

## 2019-12-09 NOTE — PROGRESS NOTES
Outpatient Infusion Center Progress Note    0031 Pt admit to [facility] for Daily Invanz ambulatory in stable condition. Assessment completed. No new concerns voiced. Single lumen PICC with positive blood return. Labs drawn and sent for processing per orders. Visit Vitals  /75 (BP 1 Location: Left arm, BP Patient Position: Sitting)   Pulse 88   Temp 98.3 °F (36.8 °C)   Resp 18       Medications:  Invanz 1g IV    1630 Pt tolerated treatment well. D/c home ambulatory in no distress. Pt aware of next appointment scheduled for 12/10/2019.

## 2019-12-10 ENCOUNTER — HOSPITAL ENCOUNTER (OUTPATIENT)
Dept: INFUSION THERAPY | Age: 59
Discharge: HOME | End: 2019-12-10
Payer: MEDICAID

## 2019-12-10 VITALS
RESPIRATION RATE: 18 BRPM | HEART RATE: 106 BPM | TEMPERATURE: 98 F | DIASTOLIC BLOOD PRESSURE: 81 MMHG | SYSTOLIC BLOOD PRESSURE: 146 MMHG

## 2019-12-10 PROCEDURE — 96365 THER/PROPH/DIAG IV INF INIT: CPT

## 2019-12-10 PROCEDURE — 74011250636 HC RX REV CODE- 250/636: Performed by: INTERNAL MEDICINE

## 2019-12-10 PROCEDURE — 74011000258 HC RX REV CODE- 258: Performed by: INTERNAL MEDICINE

## 2019-12-10 RX ADMIN — ERTAPENEM SODIUM 1 G: 1 INJECTION, POWDER, LYOPHILIZED, FOR SOLUTION INTRAMUSCULAR; INTRAVENOUS at 15:30

## 2019-12-10 NOTE — PROGRESS NOTES
Outpatient Infusion Center Short Visit Progress Note    2313 Pt admit to [facility] for daily Invanz ambulatory in stable condition. Assessment completed. No new concerns voiced. Single lumen PICC flushed w/ positive blood return. Patient Vitals for the past 12 hrs:   Temp Pulse Resp BP   12/10/19 1530 98 °F (36.7 °C) (!) 106 18 146/81         Medications:  Invanz 1 g    1610 Pt tolerated treatment well. D/c home ambulatory in no distress. Pt aware of next appointment scheduled for 12/11/2019.

## 2019-12-11 ENCOUNTER — HOSPITAL ENCOUNTER (OUTPATIENT)
Dept: INFUSION THERAPY | Age: 59
Discharge: HOME | End: 2019-12-11
Payer: MEDICAID

## 2019-12-11 VITALS
OXYGEN SATURATION: 98 % | RESPIRATION RATE: 18 BRPM | DIASTOLIC BLOOD PRESSURE: 80 MMHG | TEMPERATURE: 98.2 F | SYSTOLIC BLOOD PRESSURE: 129 MMHG | HEART RATE: 82 BPM

## 2019-12-11 PROCEDURE — 74011250636 HC RX REV CODE- 250/636: Performed by: INTERNAL MEDICINE

## 2019-12-11 PROCEDURE — 74011000258 HC RX REV CODE- 258: Performed by: INTERNAL MEDICINE

## 2019-12-11 PROCEDURE — 96365 THER/PROPH/DIAG IV INF INIT: CPT

## 2019-12-11 RX ORDER — SODIUM CHLORIDE 0.9 % (FLUSH) 0.9 %
10-40 SYRINGE (ML) INJECTION AS NEEDED
Status: DISCONTINUED | OUTPATIENT
Start: 2019-12-11 | End: 2019-12-12 | Stop reason: HOSPADM

## 2019-12-11 RX ORDER — HEPARIN 100 UNIT/ML
500 SYRINGE INTRAVENOUS AS NEEDED
Status: DISCONTINUED | OUTPATIENT
Start: 2019-12-11 | End: 2019-12-12 | Stop reason: HOSPADM

## 2019-12-11 RX ADMIN — Medication 500 UNITS: at 10:14

## 2019-12-11 RX ADMIN — Medication 10 ML: at 09:44

## 2019-12-11 RX ADMIN — Medication 20 ML: at 10:14

## 2019-12-11 RX ADMIN — ERTAPENEM SODIUM 1 G: 1 INJECTION, POWDER, LYOPHILIZED, FOR SOLUTION INTRAMUSCULAR; INTRAVENOUS at 09:44

## 2019-12-11 NOTE — PROGRESS NOTES
[facility] Progress Note    Date: 2019    Name: Conrado Lefort    MRN: [id number]         : 1960    Mr. Corcoran Arrived ambulatory and in no distress for daily Invanz. Assessment was completed, no acute issues at this time, no new complaints voiced. PICC flushed with positive blood return and Invanz started as ordered. Mr. Corcoran's vitals were reviewed. Patient Vitals for the past 12 hrs:   Temp Pulse Resp BP SpO2   19 0937 98.2 °F (36.8 °C) 82 18 129/80 98 %       Medications Administered     ertapenem (INVANZ) 1 g in 0.9% sodium chloride (MBP/ADV) 50 mL     Admin Date  2019 Action  New Bag Dose  1 g Rate  100 mL/hr Route  IntraVENous Administered By  Robinson Agosto RN          heparin (porcine) pf 500 Units     Admin Date  2019 Action  Given Dose  500 Units Route  IntraVENous Administered By  Robinson Agosto RN          sodium chloride (NS) flush 10-40 mL     Admin Date  2019 Action  Given Dose  10 mL Route  IntraVENous Administered By  Robinson Agosto RN           Admin Date  2019 Action  Given Dose  20 mL Route  IntraVENous Administered By  Robinson Agosto RN              Mr. Jovany Hanna tolerated treatment well. PICC maintained positive blood return throughout treatment. PICC flushed, heparinized and capped per protocol. Patient was discharged from [place] [address] in stable condition at 1015.      Future Appointments   Date Time Provider Susan Ro   2019  4:00 PM [facility] INFUSION NURSE 6 [facility]   2019  1:00 PM Omayra Charles DO [address]   2019  4:00 PM [facility]   2019  4:00 PM [facility]    2019  8:00 AM [facility]   12/15/2019  9:00 AM BREMO INFUSION NURSE 1 [facility]   2019  4:00 PM BREMO INFUSION NURSE 6 [facility]   2019  4:00 PM BREMO INFUSION NURSE 6 [facility]   12/18/2019  4:00 PM BREMO INFUSION NURSE 6 [facility]   12/19/2019  4:00 PM BREMO INFUSION NURSE 6 [facility]   12/20/2019  4:00 PM BREMO INFUSION NURSE 6 [facility]   12/23/2019  4:00 PM BREMO INFUSION NURSE 6 [facility]   12/24/2019  9:00 AM BREMO INFUSION NURSE 6 [facility]   12/25/2019  9:00 AM BREMO INFUSION NURSE 6 [facility]         Alma Vasquez RN  December 11, 2019

## 2019-12-12 ENCOUNTER — HOSPITAL ENCOUNTER (OUTPATIENT)
Dept: INFUSION THERAPY | Age: 59
Discharge: HOME | End: 2019-12-12
Payer: MEDICAID

## 2019-12-12 ENCOUNTER — OFFICE VISIT (OUTPATIENT)
Dept: FAMILY MEDICINE CLINIC | Age: 59
End: 2019-12-12

## 2019-12-12 VITALS
RESPIRATION RATE: 18 BRPM | SYSTOLIC BLOOD PRESSURE: 140 MMHG | HEART RATE: 90 BPM | DIASTOLIC BLOOD PRESSURE: 84 MMHG | TEMPERATURE: 98.5 F

## 2019-12-12 VITALS
RESPIRATION RATE: 18 BRPM | DIASTOLIC BLOOD PRESSURE: 80 MMHG | SYSTOLIC BLOOD PRESSURE: 128 MMHG | OXYGEN SATURATION: 96 % | BODY MASS INDEX: 31.64 KG/M2 | HEIGHT: 72 IN | TEMPERATURE: 97.6 F | HEART RATE: 99 BPM | WEIGHT: 233.6 LBS

## 2019-12-12 DIAGNOSIS — K75.0 LIVER ABSCESS: Primary | ICD-10-CM

## 2019-12-12 PROCEDURE — 96365 THER/PROPH/DIAG IV INF INIT: CPT

## 2019-12-12 PROCEDURE — 74011250636 HC RX REV CODE- 250/636: Performed by: INTERNAL MEDICINE

## 2019-12-12 PROCEDURE — 74011000258 HC RX REV CODE- 258: Performed by: INTERNAL MEDICINE

## 2019-12-12 RX ORDER — SODIUM CHLORIDE 0.9 % (FLUSH) 0.9 %
10-40 SYRINGE (ML) INJECTION AS NEEDED
Status: DISCONTINUED | OUTPATIENT
Start: 2019-12-12 | End: 2019-12-13 | Stop reason: HOSPADM

## 2019-12-12 RX ORDER — HEPARIN 100 UNIT/ML
500 SYRINGE INTRAVENOUS AS NEEDED
Status: DISCONTINUED | OUTPATIENT
Start: 2019-12-12 | End: 2019-12-13 | Stop reason: HOSPADM

## 2019-12-12 RX ORDER — BISMUTH SUBSALICYLATE 262 MG
1 TABLET,CHEWABLE ORAL DAILY
COMMUNITY

## 2019-12-12 RX ADMIN — Medication 10 ML: at 16:14

## 2019-12-12 RX ADMIN — Medication 500 UNITS: at 16:46

## 2019-12-12 RX ADMIN — Medication 20 ML: at 16:46

## 2019-12-12 RX ADMIN — ERTAPENEM SODIUM 1 G: 1 INJECTION, POWDER, LYOPHILIZED, FOR SOLUTION INTRAMUSCULAR; INTRAVENOUS at 16:16

## 2019-12-12 NOTE — PROGRESS NOTES
[facility] Progress Note    Date: 2019    Name: Rose Marie Jenkins    MRN: [id number]         : 1960    Mr. Corcoran Arrived ambulatory and in no distress for daily Invanz. Assessment was completed, no acute issues at this time, no new complaints voiced. PICC flushed with positive blood return and Invanz started as ordered. Mr. Corcoran's vitals were reviewed. Patient Vitals for the past 12 hrs:   Temp Pulse Resp BP   19 1613 98.5 °F (36.9 °C) 90 18 140/84       Medications Administered     ertapenem (INVANZ) 1 g in 0.9% sodium chloride (MBP/ADV) 50 mL     Admin Date  2019 Action  New Bag Dose  1 g Rate  100 mL/hr Route  IntraVENous Administered By  Noreen Montano RN          heparin (porcine) pf 500 Units     Admin Date  2019 Action  Given Dose  500 Units Route  IntraVENous Administered By  Noreen Montano RN          sodium chloride (NS) flush 10-40 mL     Admin Date  2019 Action  Given Dose  10 mL Route  IntraVENous Administered By  Noreen Montano RN           Admin Date  2019 Action  Given Dose  20 mL Route  IntraVENous Administered By  Noreen Montano RN              Mr. Dipesh Manning tolerated treatment well. PICC maintained positive blood return throughout treatment. PICC flushed, heparinized and capped per protocol. Patient was discharged from [place] [address] in stable condition at 1650.      Future Appointments   Date Time Provider Susan Ro   2019  4:00 [facility]    2019  8:00 AM [facility]   12/15/2019  9:00 AM [facility]   2019  4:00 PM [facility]   2019  4:00 PM [facility]   2019  4:00 PM [facility]   2019  4:00 PM BREMO INFUSION NURSE 6 [facility]   2019  4:00 PM BREMO INFUSION NURSE 65 [facility]   12/23/2019  4:00 PM BREMO INFUSION NURSE 6 [facility]   12/24/2019  9:00 AM [facility]   12/25/2019  9:00 AM BREMO INFUSION NURSE 6 [facility]   1/7/2020  3:00 PM Lilliana Alfaro DO [address] [place] [address] Cecy Brush RN  December 12, 2019

## 2019-12-12 NOTE — PROGRESS NOTES
Infectious Disease Clinic Note      HPI:       Mr Du Mcleod seen in follow up of liver abscess  Denied fever, chills, nausea, vomiting, abd pain, diarrhea   Gets antibiotics at infusion center at [facility]  Denied issues wt picc line  Denied yeast infection symptoms       Current Outpatient Medications:     multivitamin (ONE A DAY) tablet, Take 1 Tab by mouth daily. , Disp: , Rfl:     Lactobac 41/B. bifid,lactis/FOS (ULTIMATE PROBIOTIC-10 PO), Take 1 Cap by mouth., Disp: , Rfl:     L. acidoph & paracasei- S therm- Bifido (XIOMARA-Q/RISAQUAD) 8 billion cell cap cap, Take 1 Cap by mouth daily for 30 days. , Disp: 30 Cap, Rfl: 0    ertapenem 1 gram 1 g IVPB, 1 g by IntraVENous route every twenty-four (24) hours for 36 days. , Disp: 30 Dose, Rfl: 0    oxyCODONE-acetaminophen (PERCOCET) 5-325 mg per tablet, Take 1 Tab by mouth every six (6) hours as needed for Pain., Disp: , Rfl:   No current facility-administered medications for this visit.      Facility-Administered Medications Ordered in Other Visits:     [START ON 12/17/2019] ertapenem (INVANZ) 1 g in 0.9% sodium chloride (MBP/ADV) 50 mL, 1 g, IntraVENous, NOW, Go, Nely R, DO    [START ON 12/16/2019] ertapenem (INVANZ) 1 g in 0.9% sodium chloride (MBP/ADV) 50 mL, 1 g, IntraVENous, ONCE, Gomadam, Nely R, DO    ertapenem (INVANZ) 1 g in 0.9% sodium chloride (MBP/ADV) 50 mL, 1 g, IntraVENous, NOW, Gomadam, Nely R, DO    [START ON 12/14/2019] ertapenem (INVANZ) 1 g in 0.9% sodium chloride (MBP/ADV) 50 mL, 1 g, IntraVENous, NOW, Gomadam, Nely R, DO    [START ON 12/15/2019] ertapenem (INVANZ) 1 g in 0.9% sodium chloride (MBP/ADV) 50 mL, 1 g, IntraVENous, NOW, Gomadam, Nely R, DO    [START ON 12/13/2019] ertapenem (INVANZ) 1 g in 0.9% sodium chloride (MBP/ADV) 50 mL, 1 g, IntraVENous, NOW, Nely Alvarado, DO          Physical Exam:    Vitals:   Patient Vitals for the past 24 hrs:   Temp Pulse Resp BP SpO2   11/21/19 0858 97.9 °F (36.6 °C) 86 18 114/73 96 %   11/21/19 0506 97.6 °F (36.4 °C) 72 18 111/68 96 %   11/20/19 2327 97.6 °F (36.4 °C) 73 15 118/82 97 %   11/20/19 1927 98 °F (36.7 °C) 86 18 123/75 98 %   11/20/19 1544 97.2 °F (36.2 °C) 87 14 125/85 97 %   11/20/19 1345  92 16 117/85 99 %   11/20/19 1330  (!) 103 16 107/85 99 %   11/20/19 1315  100 20 (!) 89/59    11/20/19 1312  (!) 112 12 103/60 99 %   11/20/19 1310    103/60      · GEN: NAD,  · HEENT:  no scleral icterus,   no thrush, missing tooth in the front,  · CV: S1, S2 heard regularly,  · Lungs: Clear to auscultation bilaterally  · Abdomen: soft, non distended, non tender, no rash   · Extremities: no edema  · Skin: no rash        Labs:   Reviewed 12/9/19 wbc 8.6, plt 248, hbg 12.8, cr 0.85, alt 33, ast 17, alk phos 140, bili 0.3      Microbiology Data:     Blood 11/17/19    Component Value Ref Range & Units Status   Special Requests: NO SPECIAL REQUESTS    Preliminary   Culture result: NO GROWTH 4 DAYS    Preliminary   Result History                  Blood: 11/12/19  Component Value Ref Range & Units Status   Special Requests: NO SPECIAL REQUESTS    Final   Culture result: Abnormal     Final   FUSOBACTERIUM SPECIES BETA LACTAMASE NEGATIVE GROWING IN 2 OF 4 BOTTLES DRAWN (SITES = L AC AND R AC)   Culture result: Abnormal     Final   PRELIMINARY REPORT OF ANAEROBIC GRAM NEGATIVE RODS GROWING IN 2 OF 4 BOTTLES DRAWN CALLED TO AND READ BACK BY Aron Arias RN ON 11/17/19 AT 0108 TA. Culture result:    Final   REMAINING BOTTLE(S) HAS/HAVE NO GROWTH IN 5 DAYS    Result History                 Abscess 11/13/19       Component Value Ref Range & Units Status   Special Requests: LIVER    Final   GRAM STAIN 3+ WBCS SEEN    Final   GRAM STAIN NO ORGANISMS SEEN    Final   Culture result:   Final   NO GROWTH IN 2 DAYS, AEROBICALLY    Culture result: Abnormal     Final   MODERATE ANAEROBIC GRAM NEGATIVE RODS ISOLATED . ... PLEASE REFER TO [facility] [id number], FOR IDENTIFICATION    Result History     Specimen Information: Abscess        Component Value Ref Range & Units Status   Special Requests: LIVER   Final   Culture result: Abnormal     Final   MODERATE FUSOBACTERIUM SPECIES BETA LACTAMASE NEGATIVE    Result History         Component Value Ref Range & Units Status   Special Requests: LIVER    Final   GRAM STAIN 2+ WBCS SEEN    Final   GRAM STAIN NO ORGANISMS SEEN    Final   Culture result:   Final   NO GROWTH 2 DAYS, AEROBICALLY    Culture result: Abnormal     Final   MODERATE ANAEROBIC GRAM NEGATIVE RODS ISOLATED . .. PLEASE REFER TO Sal Ruelas [id number] FOR IDENTIFICATION    Result History         Pathology Results:       No definite organisms identified on routine stains. Recommend correlation with pending microbiology testing. Amoebic infections may also result in liver abscess. Depending on clinical information, consider serologic testing Addendum Electronically Signed Out By Brennon Dunn MD   [facility]/11/15/2019         Specimen Source   1: Liver, Core biopsy with touch intepretation:   CYTOLOGIC INTERPRETATION:   1. Liver, Core biopsy with touch intepretation:   Liver parenchyma with acute and chronic inflammation and macrovesicular steatosis   See comment   General Categorization   No cells diagnostic for malignancy   Specimen Adequacy   Satisfactory for evaluation     Imaging:   US [facility] 11/12/19  COMPARISON:  CT scan earlier today     FINDINGS: Limited right upper quadrant ultrasound was performed. The liver is  diffusely increased in echogenicity and enlarged. Within the left lobe of the  liver, there is an ill-defined, mixed echogenicity mass, measuring 5.6 x 5.5 x  3.6 cm. . The common bile duct is normal measuring 3 mm in diameter. The  gallbladder is normal. The right kidney measures 11.8 cm in length.     IMPRESSION  IMPRESSION:      1. Enlarged, echogenic liver, compatible with diffuse fatty infiltration.   2. Complex mass in the left lobe of the liver measuring 5.6 cm.  3. No biliary ductal dilatation.     Further evaluation with liver mass protocol MRI or CT is recommended. CT ABD 11/13/19  FINDINGS:      LIVER: There is a 6.5 cm multiloculated low density cystic lesion in segment 2/3  of the left hepatic lobe concerning for abscess versus cystic neoplasm. Small  nonspecific low-density focus in the right hepatic lobe measuring 3.4 x 1.1 cm  (series 6, image 40). No other focal liver abnormality. No biliary ductal  dilatation   GALLBLADDER: Probable small stones but otherwise unremarkable in appearance. SPLEEN: Borderline splenomegaly  PANCREAS: No mass or ductal dilatation. ADRENALS: Unremarkable. KIDNEYS/URETERS: Symmetric nephrograms without evidence for focal mass. No  hydronephrosis. PERITONEUM: Borderline enlarged para-aortic retroperitoneal lymph nodes. No  other evidence for abdominal lymphadenopathy. No ascites. COLON: Inflammatory changes in the sigmoid colon are stable, likely related to  diverticulitis. Stable probable contained microperforation along the posterior  wall of the sigmoid colon (image 147). No free intraperitoneal gas. No evidence  for pericolonic abscess. APPENDIX: Unremarkable. SMALL BOWEL: No dilatation or wall thickening. STOMACH: Unremarkable. PELVIS: No pelvic lymphadenopathy. Trace pericolonic free fluid. The bladder is  unremarkable. BONES: No destructive bone lesion. Mild to moderate lumbosacral spondylosis. VISUALIZED THORAX: Bibasilar subsegmental atelectasis  ADDITIONAL COMMENTS: N/A     IMPRESSION  IMPRESSION:     6 cm multiloculated low density/cystic lesion in the left hepatic lobe may  represent abscess in the appropriate clinical picture. Cystic neoplasm is  another possibility. There is a smaller 3 cm nonspecific low-density focus in  the right hepatic lobe.     Stable inflammatory changes involving the sigmoid colon.     CT 11/12/19  COMPARISON: CT chest 11/12/2019     CONTRAST:  None.     TECHNIQUE:   Thin axial images were obtained through the abdomen and pelvis. Coronal and  sagittal reconstructions were generated. Oral contrast was not administered. CT  dose reduction was achieved through use of a standardized protocol tailored for  this examination and automatic exposure control for dose modulation.      The absence of intravenous contrast material reduces the sensitivity for  evaluation of the solid parenchymal organs of the abdomen.      FINDINGS:   LUNG BASES: There is mild bibasilar atelectasis. INCIDENTALLY IMAGED HEART AND MEDIASTINUM: Unremarkable. LIVER: Diffuse fatty infiltration of the liver. GALLBLADDER: Unremarkable. SPLEEN: No mass. PANCREAS: No mass or ductal dilatation. ADRENALS: Unremarkable. KIDNEYS/URETERS: No mass, calculus, or hydronephrosis. STOMACH: Unremarkable. SMALL BOWEL: No bowel obstruction or perforation  COLON: Extensive diverticulosis of the colon. There is bowel wall thickening of  the sigmoid colon with extensive diverticula as well as pericolonic  inflammation, but no focal fluid collections. There is also a small amount of  gas adjacent to the affected loop of sigmoid colon, which may be extraluminal.  There is no free intraperitoneal air. APPENDIX: Unremarkable. PERITONEUM: No ascites  RETROPERITONEUM: No lymphadenopathy or aortic aneurysm. REPRODUCTIVE ORGANS: The prostate is noted. URINARY BLADDER: No mass or calculus. BONES: No destructive bone lesion. ADDITIONAL COMMENTS: There is degenerative disc disease which is most advanced  at L4-L5.     IMPRESSION  IMPRESSION:     1. Concentric bowel wall thickening of a 7 cm segment of sigmoid colon, where  there are extensive diverticula and mild pericolonic inflammation. This is  consistent with colitis, which may be due to diverticulitis. Notably, there is a  small amount of extraluminal gas suspected, immediately adjacent to the affected  segment of sigmoid colon. This may represent a localized perforation.   2. No evidence of abscess or bowel obstruction. 3. Diffuse fatty infiltration of the liver. FINDINGS:   The visualized thyroid gland is unremarkable. The aorta and main pulmonary  artery are normal in caliber. There is coronary artery atherosclerosis. The  heart size is normal.  There is no pericardial effusion.       There are no enlarged axillary, mediastinal, or hilar lymph nodes.      There are minimal subpleural reticular opacities in the lower lungs. There is  minimal left basilar scarring or atelectasis. There is no suspicious lung  nodule, mass, or consolidation.     In the limited images of the upper abdomen, the partially imaged solid organs  are unremarkable. Degenerative changes in the spine. There is no aggressive bony  lesion.     IMPRESSION  IMPRESSION:   Minimal interstitial changes in the lower lungs with minimal left basilar  scarring or atelectasis. No evidence for atypical infection or other acute  abnormality. TTE  Left Ventricle Normal cavity size, wall thickness, systolic function (ejection fraction normal) and diastolic function. The muscle mass is normal. The cavity shape is normal. The estimated ejection fraction is 56 - 60%. No regional wall motion abnormality noted. End-systolic volume is normal. Normal left ventricular strain. Normal left ventricular diastolic pressure. End-diastolic volume is normal.   Wall Scoring The left ventricular wall motion is normal.            Left Atrium Normal cavity size. No atrial septal defect present. Right Ventricle Normal cavity size, wall thickness and global systolic function. Right Atrium Normal cavity size. Interatrial Septum No atrial septal defect present. No interatrial septal aneurysm present. .   Aortic Valve Normal valve structure, trileaflet valve structure, no stenosis and no regurgitation. Mitral Valve Normal valve structure, no stenosis and no regurgitation. Tricuspid Valve Normal valve structure, no stenosis and no regurgitation.    Pulmonic Valve Pulmonic valve not well visualized. Normal valve structure, no stenosis and no regurgitation. Aorta Normal aortic root, ascending aortic, and aortic arch. IVC/Hepatic Veins Mildly elevated central venous pressure (5-10 mmHg); IVC diameter is less than 21 mm and collapses less than 50% with respiration. CT ABD 11/18/19  FINDINGS:   LUNG BASES: Small bilateral pleural effusions are present increase in interval.  Bibasilar volume loss is present. INCIDENTALLY IMAGED HEART AND MEDIASTINUM: Unremarkable. LIVER: The left hepatic lobe segment 2, again noted is a lobulated hypodense  lesion which demonstrates possible mild peripheral enhancement. Overall lesion  size is not significantly changed although there may be some increased edema  around the lesion. GALLBLADDER: Unremarkable. SPLEEN: No mass. PANCREAS: No mass or ductal dilatation. ADRENALS: Unremarkable. KIDNEYS: No mass, calculus, or hydronephrosis. STOMACH: Unremarkable. SMALL BOWEL: No dilatation or wall thickening. COLON: Colonic wall thickening and mild pericolonic inflammation is noted in the  sigmoid colon compatible with diverticulitis. No evidence of perforation or  abscess formation. APPENDIX: Unremarkable. PERITONEUM: No significant free fluid is identified. No lymphadenopathy in the  peritoneum. RETROPERITONEUM: Mildly prominent retroperitoneal lymph nodes measuring up to 11  mm in size not significantly changed. REPRODUCTIVE ORGANS: Unremarkable  URINARY BLADDER: Nondistended and mildly thickened  BONES: No destructive bone lesion. ADDITIONAL COMMENTS: N/A     IMPRESSION  IMPRESSION:  1. Lobulated hypodense liver lesion without significant change in size. Possible increased surrounding edema. . This is not entirely specific however  previous biopsy demonstrated probable infection.     2. No evidence of diverticular abscess or perforation.  Persistent colonic wall  thickening and mild pericolonic inflammation of the sigmoid colon. No  significant free fluid.     3.  Other incidental findings as described      Carotid US  Right Carotid     There is no stenosis in the right CCA. There is mild stenosis in the right ICA (<50%). The right ICA has mixed density plaque. There is no stenosis in the right ECA. The right vertebral is antegrade. Left Carotid     There is no stenosis in the left CCA. There is mild stenosis in the left ICA (<50%). The left ICA has mixed density plaque. There is no stenosis in the left ECA. The left vertebral is antegrade. LATANYA 11/20/19  Findings: no conclusive evidence for vegetations on valves  Probable right atrial mass attached to lateral wall the nature of which is uncertain    Cardiac MRI 11/22/19    1. Severe concentric left ventricular hypertrophy. Mild left ventricular  systolic dysfunction. Dyskinesis of the mid to distal anterior wall. LVEF 45%. 2. Normal right ventricular size and systolic function. RVEF 60%. 3. Bicuspid aortic valve without aortic valve stenosis. 4. Careful and focused multioblique imaging of the right atrium was performed  using T1, T2 W, FIESTA and postcontrast imaging. No pathological right atrial  masses were visualized. 5. Normal pleura and pericardium. There is no significant effusions. 6. Incidental finding of a hepatic lesion measuring 30 x 19 mm which is very  bright on triple IR T2 W images. This is likely a small hepatic hemangioma  however dedicated hepatic imaging is recommended for further evaluation. 7. The transesophageal echocardiogram from November 20, 2019 was reviewed in  relationship to this MRI.  The questionable right atrial mass seen on LATANYA is  likely a shadow artifact as numerous saline bubbles were seen crossing this  area.         Procedures:     67/93/52  Uncomplicated CT-guided left lobe liver mass biopsy and aspiration      Assessment / Plan:      Mr Florencia Roca is a 33-year-old gentleman reports no significant past medical history admitted on 11/12/2019 with abdominal pain and diarrhea. He reports having a [birth year] on [birth year] and made a large pot of spaghetti. That evening he noticed he had explosive diarrhea. Started experiencing abdominal pain and started feeling very sick and therefore presented to the hospital.  Found to have diverticulitis with microperforation and work-up also showed liver abscess/mass . He had this drained by CT-guided biopsy/drainage on 11/13/2019. He says he feels better but still very tired and recuperating from his recent illness. Says diarrhea is resolved . Denies any fevers chills or night sweats ongoing . Has started eating. Overall improved . Tolerating antibiotics without any issues at this time. From chart review, his T-max is 99.9. He has leukocytosis that is uptrending to 18.2. His renal function is normal.  His LFTs are elevated but trending down. 1) Fusobacterium bacteremia   Usually seen in oral/dental, deep neck infections  He denied any dental or neck pain  Seeding suspected from liver abscess   TTE noted   LATANYA without  conclusive evidence for vegetatios but R atrial mass.  Cardiac MRI done and no masses seen    Noted Carotid US   On Ertapenem IV    Denies adverse effects, seizures wt antibiotics   Risk for CDI discussed   Probiotics continued       2) Liver abscess   Status post drainage on 11/13/2019  Cultures with GNR anaerobic   Ertapenem  IR   Repeat imaging of abdomen to assess response   Based on imaging will decide further course    Usually liver abscesses are treated until they have resolved for 4 to 6 weeks with clinical monitoring as well as radiographic monitoring to assess response to treatment    3) diverticulitis with microperforation  Evaluated by surgery last admission     4) RTC 3 weeks

## 2019-12-13 ENCOUNTER — HOSPITAL ENCOUNTER (OUTPATIENT)
Dept: INFUSION THERAPY | Age: 59
Discharge: HOME | End: 2019-12-13
Payer: MEDICAID

## 2019-12-13 VITALS
HEART RATE: 97 BPM | DIASTOLIC BLOOD PRESSURE: 81 MMHG | RESPIRATION RATE: 18 BRPM | TEMPERATURE: 98.6 F | SYSTOLIC BLOOD PRESSURE: 124 MMHG

## 2019-12-13 PROCEDURE — 74011250636 HC RX REV CODE- 250/636: Performed by: INTERNAL MEDICINE

## 2019-12-13 PROCEDURE — 74011000258 HC RX REV CODE- 258: Performed by: INTERNAL MEDICINE

## 2019-12-13 PROCEDURE — 96365 THER/PROPH/DIAG IV INF INIT: CPT

## 2019-12-13 RX ADMIN — ERTAPENEM SODIUM 1 G: 1 INJECTION, POWDER, LYOPHILIZED, FOR SOLUTION INTRAMUSCULAR; INTRAVENOUS at 16:34

## 2019-12-13 NOTE — PROGRESS NOTES
[facility] Short Note                       Date: 2019    Name: Bran Ibanez III    MRN: [id number]         : 1960    1625 Pt admit to [facility] for daily Invanz ambulatory in stable condition. Assessment completed. No new concerns voiced. Mr. Corcoran's vitals were reviewed prior to treatment. Patient Vitals for the past 12 hrs:   Temp Pulse Resp BP   19 1625 98.6 °F (37 °C) 97 18 124/81       PIcc with positive blood return flushed, heparinized and capped per protocol. Medications given:   Medications Administered     ertapenem (INVANZ) 1 g in 0.9% sodium chloride (MBP/ADV) 50 mL     Admin Date  2019 Action  New Bag Dose  1 g Rate  100 mL/hr Route  IntraVENous Administered By  Claudia Dias, BRENNA                   7856 Pt tolerated treatment well. D/c home ambulatory in no distress.      Future Appointments   Date Time Provider Susan Ro   2019  8:00 AM [facility] NURSE 1 [facility]   12/15/2019  9:00 AM [facility]ION NURSE 1 [facility]   2019  4:00 PM [facility] INFUSION NURSE 6 [facility]   2019  4:00 PM [facility] INFUSION NURSE 6 [facility]   2019  4:00 PM [facility] INFUSION NURSE 6 [facility]   2019  4:00 PM [facility] INFUSION NURSE 6 [facility]   2019  4:00 PM [facility] INFUSION NURSE 6 [facility]   2019  4:00 PM [facility] INFUSION NURSE 6 [facility]   2019  9:00 AM [facility] INFUSION NURSE 6 [facility]   2019  9:00 AM [facility] INFUSION NURSE 6 [facility]   2020  3:00 PM Makenna Oliva DO [address] [place] [address], RN  2019  4:45 PM

## 2019-12-14 ENCOUNTER — HOSPITAL ENCOUNTER (OUTPATIENT)
Dept: INFUSION THERAPY | Age: 59
Discharge: HOME | End: 2019-12-14
Payer: MEDICAID

## 2019-12-14 VITALS
HEART RATE: 19 BPM | SYSTOLIC BLOOD PRESSURE: 149 MMHG | RESPIRATION RATE: 18 BRPM | DIASTOLIC BLOOD PRESSURE: 80 MMHG | TEMPERATURE: 97.8 F

## 2019-12-14 PROCEDURE — 96365 THER/PROPH/DIAG IV INF INIT: CPT

## 2019-12-14 PROCEDURE — 77030020847 HC STATLOK BARD -A

## 2019-12-14 PROCEDURE — 74011000258 HC RX REV CODE- 258: Performed by: INTERNAL MEDICINE

## 2019-12-14 PROCEDURE — 74011250636 HC RX REV CODE- 250/636: Performed by: INTERNAL MEDICINE

## 2019-12-14 RX ORDER — HEPARIN 100 UNIT/ML
500 SYRINGE INTRAVENOUS AS NEEDED
Status: DISCONTINUED | OUTPATIENT
Start: 2019-12-14 | End: 2019-12-15 | Stop reason: HOSPADM

## 2019-12-14 RX ORDER — SODIUM CHLORIDE 0.9 % (FLUSH) 0.9 %
5-10 SYRINGE (ML) INJECTION AS NEEDED
Status: DISCONTINUED | OUTPATIENT
Start: 2019-12-14 | End: 2019-12-15 | Stop reason: HOSPADM

## 2019-12-14 RX ADMIN — Medication 10 ML: at 10:05

## 2019-12-14 RX ADMIN — SODIUM CHLORIDE, PRESERVATIVE FREE 500 UNITS: 5 INJECTION INTRAVENOUS at 10:45

## 2019-12-14 RX ADMIN — Medication 10 ML: at 10:38

## 2019-12-14 RX ADMIN — ERTAPENEM SODIUM 1 G: 1 INJECTION, POWDER, LYOPHILIZED, FOR SOLUTION INTRAMUSCULAR; INTRAVENOUS at 10:15

## 2019-12-14 RX ADMIN — SODIUM CHLORIDE, PRESERVATIVE FREE 500 UNITS: 5 INJECTION INTRAVENOUS at 10:05

## 2019-12-14 NOTE — PROGRESS NOTES
[facility] Progress Note    Date: 2019    Name: Lam Mathur III    MRN: [id number]         : 1960    1000. Mr. Austin Rivera Arrived ambulatory and in no distress for Daily Antibitoics. Assessment was completed, no acute issues at this time, no new complaints voiced. RUE PICC with + blood return, dressing changed per hospital policy by FRANCHESCA Almanza RN. Labs drawn per order & sent for processing. Patient Vitals for the past 12 hrs:   Temp Pulse Resp BP   19 1015 97.8 °F (36.6 °C) (!)  18 149/80       Medications:  Invanz IV     1050. Mr. Austin Rivera tolerated treatment well and was discharged from [place] [address] in stable condition. He is to return on 12/15/19 for his next appointment.     Joshua Clements RN  2019

## 2019-12-15 ENCOUNTER — HOSPITAL ENCOUNTER (OUTPATIENT)
Dept: INFUSION THERAPY | Age: 59
Discharge: HOME | End: 2019-12-15
Payer: MEDICAID

## 2019-12-15 VITALS
RESPIRATION RATE: 18 BRPM | HEART RATE: 74 BPM | TEMPERATURE: 97.9 F | SYSTOLIC BLOOD PRESSURE: 148 MMHG | DIASTOLIC BLOOD PRESSURE: 75 MMHG

## 2019-12-15 PROCEDURE — 74011000258 HC RX REV CODE- 258: Performed by: INTERNAL MEDICINE

## 2019-12-15 PROCEDURE — 74011250636 HC RX REV CODE- 250/636: Performed by: INTERNAL MEDICINE

## 2019-12-15 PROCEDURE — 96365 THER/PROPH/DIAG IV INF INIT: CPT

## 2019-12-15 RX ORDER — SODIUM CHLORIDE 0.9 % (FLUSH) 0.9 %
5-10 SYRINGE (ML) INJECTION AS NEEDED
Status: DISCONTINUED | OUTPATIENT
Start: 2019-12-15 | End: 2019-12-16 | Stop reason: HOSPADM

## 2019-12-15 RX ORDER — HEPARIN 100 UNIT/ML
500 SYRINGE INTRAVENOUS AS NEEDED
Status: DISCONTINUED | OUTPATIENT
Start: 2019-12-15 | End: 2019-12-16 | Stop reason: HOSPADM

## 2019-12-15 RX ADMIN — ERTAPENEM SODIUM 1 G: 1 INJECTION, POWDER, LYOPHILIZED, FOR SOLUTION INTRAMUSCULAR; INTRAVENOUS at 11:45

## 2019-12-15 RX ADMIN — Medication 10 ML: at 11:43

## 2019-12-15 RX ADMIN — Medication 10 ML: at 12:18

## 2019-12-15 RX ADMIN — Medication 10 ML: at 12:16

## 2019-12-15 RX ADMIN — SODIUM CHLORIDE, PRESERVATIVE FREE 500 UNITS: 5 INJECTION INTRAVENOUS at 12:18

## 2019-12-15 NOTE — PROGRESS NOTES
Outpatient Infusion Center Short Visit Progress Note    1140 Patient admitted to [facility] for daily antibiotics ambulatory in stable condition. Assessment completed. No new concerns voiced. Vital Signs:  Visit Vitals  /82   Pulse 92   Temp 98 °F (36.7 °C)   Resp 18         PICC with positive blood return. Lab Results:  None        Medications:  Medications Administered     ertapenem (INVANZ) 1 g in 0.9% sodium chloride (MBP/ADV) 50 mL     Admin Date  12/15/2019 Action  New Bag Dose  1 g Rate  100 mL/hr Route  IntraVENous Administered By  Wanita Lanes, RN          sodium chloride (NS) flush 5-10 mL     Admin Date  12/15/2019 Action  Given Dose  10 mL Route  IntraVENous Administered By  Wanita Lanes, RN                6053 Patient tolerated treatment well. Patient discharged from [place] [address] ambulatory in no distress at 1230. Patient aware of next appointment.     Future Appointments   Date Time Provider Susan Ro   12/16/2019  4:00 PM [facility]   12/17/2019  4:00 PM [facility]   12/18/2019  4:00 PM [facility]   12/19/2019  4:00 PM [facility]   12/20/2019  4:00 PM [facility] INFUSION NURSE 6 [facility]   12/23/2019  4:00 PM [facility] INFUSION NURSE 6 [facility]   12/24/2019  9:00 AM [facility] INFUSION NURSE 6 [facility]   12/25/2019  9:00 AM [facility]   1/7/2020  3:00 PM Carol Alvarado,  [address]

## 2019-12-16 ENCOUNTER — HOSPITAL ENCOUNTER (OUTPATIENT)
Dept: INFUSION THERAPY | Age: 59
Discharge: HOME | End: 2019-12-16
Payer: MEDICAID

## 2019-12-16 ENCOUNTER — TELEPHONE (OUTPATIENT)
Dept: FAMILY MEDICINE CLINIC | Age: 59
End: 2019-12-16

## 2019-12-16 VITALS
HEART RATE: 105 BPM | SYSTOLIC BLOOD PRESSURE: 130 MMHG | DIASTOLIC BLOOD PRESSURE: 83 MMHG | RESPIRATION RATE: 18 BRPM | TEMPERATURE: 98.3 F

## 2019-12-16 LAB
ALBUMIN SERPL-MCNC: 3.6 G/DL (ref 3.5–5)
ALBUMIN/GLOB SERPL: 0.9 {RATIO} (ref 1.1–2.2)
ALP SERPL-CCNC: 140 U/L (ref 45–117)
ALT SERPL-CCNC: 40 U/L (ref 12–78)
ANION GAP SERPL CALC-SCNC: 8 MMOL/L (ref 5–15)
AST SERPL-CCNC: 23 U/L (ref 15–37)
BASOPHILS # BLD: 0 K/UL (ref 0–0.1)
BASOPHILS NFR BLD: 0 % (ref 0–1)
BILIRUB DIRECT SERPL-MCNC: 0.1 MG/DL (ref 0–0.2)
BILIRUB SERPL-MCNC: 0.2 MG/DL (ref 0.2–1)
BUN SERPL-MCNC: 16 MG/DL (ref 6–20)
BUN/CREAT SERPL: 17 (ref 12–20)
CALCIUM SERPL-MCNC: 8.9 MG/DL (ref 8.5–10.1)
CHLORIDE SERPL-SCNC: 102 MMOL/L (ref 97–108)
CO2 SERPL-SCNC: 25 MMOL/L (ref 21–32)
CREAT SERPL-MCNC: 0.94 MG/DL (ref 0.7–1.3)
DIFFERENTIAL METHOD BLD: ABNORMAL
EOSINOPHIL # BLD: 0.2 K/UL (ref 0–0.4)
EOSINOPHIL NFR BLD: 3 % (ref 0–7)
ERYTHROCYTE [DISTWIDTH] IN BLOOD BY AUTOMATED COUNT: 12.9 % (ref 11.5–14.5)
GLOBULIN SER CALC-MCNC: 4.2 G/DL (ref 2–4)
GLUCOSE SERPL-MCNC: 156 MG/DL (ref 65–100)
HCT VFR BLD AUTO: 40.5 % (ref 36.6–50.3)
HGB BLD-MCNC: 13 G/DL (ref 12.1–17)
IMM GRANULOCYTES # BLD AUTO: 0 K/UL (ref 0–0.04)
IMM GRANULOCYTES NFR BLD AUTO: 1 % (ref 0–0.5)
LYMPHOCYTES # BLD: 1.7 K/UL (ref 0.8–3.5)
LYMPHOCYTES NFR BLD: 24 % (ref 12–49)
MCH RBC QN AUTO: 29.2 PG (ref 26–34)
MCHC RBC AUTO-ENTMCNC: 32.1 G/DL (ref 30–36.5)
MCV RBC AUTO: 91 FL (ref 80–99)
MONOCYTES # BLD: 0.5 K/UL (ref 0–1)
MONOCYTES NFR BLD: 7 % (ref 5–13)
NEUTS SEG # BLD: 4.7 K/UL (ref 1.8–8)
NEUTS SEG NFR BLD: 65 % (ref 32–75)
NRBC # BLD: 0 K/UL (ref 0–0.01)
NRBC BLD-RTO: 0 PER 100 WBC
PLATELET # BLD AUTO: 237 K/UL (ref 150–400)
PMV BLD AUTO: 9.2 FL (ref 8.9–12.9)
POTASSIUM SERPL-SCNC: 3.8 MMOL/L (ref 3.5–5.1)
PROT SERPL-MCNC: 7.8 G/DL (ref 6.4–8.2)
RBC # BLD AUTO: 4.45 M/UL (ref 4.1–5.7)
SODIUM SERPL-SCNC: 135 MMOL/L (ref 136–145)
WBC # BLD AUTO: 7.2 K/UL (ref 4.1–11.1)

## 2019-12-16 PROCEDURE — 36415 COLL VENOUS BLD VENIPUNCTURE: CPT

## 2019-12-16 PROCEDURE — 96365 THER/PROPH/DIAG IV INF INIT: CPT

## 2019-12-16 PROCEDURE — 74011250636 HC RX REV CODE- 250/636: Performed by: INTERNAL MEDICINE

## 2019-12-16 PROCEDURE — 74011000258 HC RX REV CODE- 258: Performed by: INTERNAL MEDICINE

## 2019-12-16 PROCEDURE — 36592 COLLECT BLOOD FROM PICC: CPT

## 2019-12-16 PROCEDURE — 80076 HEPATIC FUNCTION PANEL: CPT

## 2019-12-16 PROCEDURE — 85025 COMPLETE CBC W/AUTO DIFF WBC: CPT

## 2019-12-16 PROCEDURE — 80048 BASIC METABOLIC PNL TOTAL CA: CPT

## 2019-12-16 RX ADMIN — ERTAPENEM SODIUM 1 G: 1 INJECTION, POWDER, LYOPHILIZED, FOR SOLUTION INTRAMUSCULAR; INTRAVENOUS at 18:00

## 2019-12-16 NOTE — PROGRESS NOTES
[facility] Short Note                       Date: 2019    Name: Cirilo Umanzor III    MRN: [id number]         : 1960    1800 Pt admit to [facility] for daily Invanz ambulatory in stable condition. Assessment completed. No new concerns voiced. Mr. Corcoran's vitals were reviewed prior to treatment. Patient Vitals for the past 12 hrs:   Temp Pulse Resp BP   19 1815 98.3 °F (36.8 °C) (!) 105 18 130/83       PIcc with positive blood return flushed, labs drawn and sent per orders, at conclusion line flushed heparinized and capped per protocol. Medications given:   Medications Administered     ertapenem (INVANZ) 1 g in 0.9% sodium chloride (MBP/ADV) 50 mL     Admin Date  2019 Action  New Bag Dose  1 g Rate  100 mL/hr Route  IntraVENous Administered By  Sophia Briggs, BRENNA                     0511 Pt tolerated treatment well. D/c home ambulatory in no distress.      Future Appointments   Date Time Provider Susan Ro   2019  4:00 PM [facility]ION NURSE 6 [facility]   2019  4:00 PM [facility] INF[facility]ION NURSE 6 [facility]   2019  4:00 PM [facility] NURSE [facility]   2019  4:00 PM [facility]SE [facility]   2019  4:00 PM [facility] INF[facility]ION NURSE 6 [facility]   2019  9:00 AM [facility] INFUSION NURSE 6 [facility]   2019  9:00 AM [facility] INFUSION NURSE 6 [facility]   2019 10:30 AM [facility] ER [facility]   2020  3:00 PM DO Miguel Patel RN  2019  4:45 PM

## 2019-12-16 NOTE — TELEPHONE ENCOUNTER
Pt called back after listening to message - states it was Kathy Yanes who called him       Pt returning a call from Friday   States it just said to call the office     No messages seen in [address] [place] [address]       [phone]

## 2019-12-18 ENCOUNTER — HOSPITAL ENCOUNTER (OUTPATIENT)
Dept: INFUSION THERAPY | Age: 59
Discharge: HOME | End: 2019-12-18
Payer: MEDICAID

## 2019-12-18 VITALS
DIASTOLIC BLOOD PRESSURE: 87 MMHG | RESPIRATION RATE: 18 BRPM | TEMPERATURE: 98 F | SYSTOLIC BLOOD PRESSURE: 145 MMHG | HEART RATE: 98 BPM

## 2019-12-18 PROCEDURE — 74011250636 HC RX REV CODE- 250/636: Performed by: INTERNAL MEDICINE

## 2019-12-18 PROCEDURE — 74011000258 HC RX REV CODE- 258: Performed by: INTERNAL MEDICINE

## 2019-12-18 PROCEDURE — 96365 THER/PROPH/DIAG IV INF INIT: CPT

## 2019-12-18 RX ORDER — SODIUM CHLORIDE 0.9 % (FLUSH) 0.9 %
10-40 SYRINGE (ML) INJECTION AS NEEDED
Status: DISCONTINUED | OUTPATIENT
Start: 2019-12-18 | End: 2019-12-19 | Stop reason: HOSPADM

## 2019-12-18 RX ORDER — HEPARIN 100 UNIT/ML
500 SYRINGE INTRAVENOUS AS NEEDED
Status: DISCONTINUED | OUTPATIENT
Start: 2019-12-18 | End: 2019-12-19 | Stop reason: HOSPADM

## 2019-12-18 RX ADMIN — Medication 500 UNITS: at 18:10

## 2019-12-18 RX ADMIN — ERTAPENEM SODIUM 1 G: 1 INJECTION, POWDER, LYOPHILIZED, FOR SOLUTION INTRAMUSCULAR; INTRAVENOUS at 17:58

## 2019-12-18 RX ADMIN — Medication 10 ML: at 18:11

## 2019-12-18 NOTE — TELEPHONE ENCOUNTER
LVM    Need to change his appt.  On 1/7/20 from a 3 pm appt that day to an am appt or to another day

## 2019-12-19 ENCOUNTER — HOSPITAL ENCOUNTER (OUTPATIENT)
Dept: INFUSION THERAPY | Age: 59
Discharge: HOME | End: 2019-12-19
Payer: MEDICAID

## 2019-12-19 VITALS
HEART RATE: 88 BPM | SYSTOLIC BLOOD PRESSURE: 121 MMHG | RESPIRATION RATE: 16 BRPM | TEMPERATURE: 97.6 F | DIASTOLIC BLOOD PRESSURE: 88 MMHG

## 2019-12-19 PROCEDURE — 96365 THER/PROPH/DIAG IV INF INIT: CPT

## 2019-12-19 PROCEDURE — 74011250636 HC RX REV CODE- 250/636: Performed by: INTERNAL MEDICINE

## 2019-12-19 PROCEDURE — 74011000258 HC RX REV CODE- 258: Performed by: INTERNAL MEDICINE

## 2019-12-19 RX ADMIN — ERTAPENEM SODIUM 1 G: 1 INJECTION, POWDER, LYOPHILIZED, FOR SOLUTION INTRAMUSCULAR; INTRAVENOUS at 15:30

## 2019-12-19 NOTE — PROGRESS NOTES
[facility]                       Date: 2019    Name: Herman Larson III    MRN: [id number]         : 1960    1530 Pt admit to [facility] for daily antibiotics  ambulatory in stable condition. Assessment completed. No new concerns voiced. Mr. Corcoran's vitals were reviewed prior to treatment. Patient Vitals for the past 12 hrs:   Temp Pulse Resp BP   19 1531 97.6 °F (36.4 °C) 88 16 121/88       PICC with positive blood return flushed, heparinized and capped per protocol. Medications given:   Medications Administered     ertapenem (INVANZ) 1 g in 0.9% sodium chloride (MBP/ADV) 50 mL     Admin Date  2019 Action  New Bag Dose  1 g Rate  100 mL/hr Route  IntraVENous Administered By  Ardyce Severance, RN                   5248 Pt tolerated treatment well. D/c home ambulatory in no distress.      Future Appointments   Date Time Provider Susan Ro   2019  4:00 PM [facility]   2019  9:00 AM [facility]   2019  9:00 AM [facility]   2019  4:00 [facility]   2019  9:00 AM [facility]   2019  9:00 AM [facility]   2019 10:30 AM [address] [place] [address] ER [address] [facility]   2019  4:00 PM [facility]   2019  4:00 PM [facility]   2019  9:00 AM [facility]   2019  9:00 AM [facility]   2019  4:00 PM [facility]   2020 11:30 AM DO [address] [facility], RN  2019  5:06 PM

## 2019-12-20 ENCOUNTER — HOSPITAL ENCOUNTER (OUTPATIENT)
Dept: INFUSION THERAPY | Age: 59
Discharge: HOME | End: 2019-12-20
Payer: MEDICAID

## 2019-12-20 VITALS
DIASTOLIC BLOOD PRESSURE: 88 MMHG | SYSTOLIC BLOOD PRESSURE: 150 MMHG | HEART RATE: 94 BPM | RESPIRATION RATE: 18 BRPM | TEMPERATURE: 98.2 F

## 2019-12-20 PROCEDURE — 96365 THER/PROPH/DIAG IV INF INIT: CPT

## 2019-12-20 PROCEDURE — 74011250636 HC RX REV CODE- 250/636: Performed by: INTERNAL MEDICINE

## 2019-12-20 PROCEDURE — 74011000258 HC RX REV CODE- 258: Performed by: INTERNAL MEDICINE

## 2019-12-20 RX ORDER — SODIUM CHLORIDE 9 MG/ML
10 INJECTION INTRAMUSCULAR; INTRAVENOUS; SUBCUTANEOUS AS NEEDED
Status: DISCONTINUED | OUTPATIENT
Start: 2019-12-20 | End: 2019-12-21 | Stop reason: HOSPADM

## 2019-12-20 RX ORDER — HEPARIN 100 UNIT/ML
500 SYRINGE INTRAVENOUS AS NEEDED
Status: DISCONTINUED | OUTPATIENT
Start: 2019-12-20 | End: 2019-12-21 | Stop reason: HOSPADM

## 2019-12-20 RX ORDER — SODIUM CHLORIDE 0.9 % (FLUSH) 0.9 %
5-10 SYRINGE (ML) INJECTION AS NEEDED
Status: DISCONTINUED | OUTPATIENT
Start: 2019-12-20 | End: 2019-12-21 | Stop reason: HOSPADM

## 2019-12-20 RX ADMIN — Medication 10 ML: at 17:51

## 2019-12-20 RX ADMIN — Medication 500 UNITS: at 17:52

## 2019-12-20 RX ADMIN — Medication 10 ML: at 17:19

## 2019-12-20 RX ADMIN — ERTAPENEM SODIUM 1 G: 1 INJECTION, POWDER, LYOPHILIZED, FOR SOLUTION INTRAMUSCULAR; INTRAVENOUS at 17:20

## 2019-12-21 ENCOUNTER — HOSPITAL ENCOUNTER (OUTPATIENT)
Dept: INFUSION THERAPY | Age: 59
Discharge: HOME | End: 2019-12-21
Payer: MEDICAID

## 2019-12-21 VITALS
DIASTOLIC BLOOD PRESSURE: 87 MMHG | SYSTOLIC BLOOD PRESSURE: 145 MMHG | HEART RATE: 90 BPM | RESPIRATION RATE: 18 BRPM | TEMPERATURE: 97.1 F | OXYGEN SATURATION: 98 %

## 2019-12-21 PROCEDURE — 96365 THER/PROPH/DIAG IV INF INIT: CPT

## 2019-12-21 PROCEDURE — 74011250636 HC RX REV CODE- 250/636: Performed by: INTERNAL MEDICINE

## 2019-12-21 PROCEDURE — 74011000258 HC RX REV CODE- 258: Performed by: INTERNAL MEDICINE

## 2019-12-21 PROCEDURE — 77030020847 HC STATLOK BARD -A

## 2019-12-21 RX ADMIN — ERTAPENEM SODIUM 1 G: 1 INJECTION, POWDER, LYOPHILIZED, FOR SOLUTION INTRAMUSCULAR; INTRAVENOUS at 10:20

## 2019-12-21 NOTE — PROGRESS NOTES
Outpatient Infusion Center   Visit Progress Note    2440 Patient admitted to [facility] for daily antibiotic and PICC dressing change ambulatory in stable condition. Assessment completed. No new concerns voiced. PICC dressing changed per protocol. Single lumen PICC with positive blood return. Visit Vitals  /87   Pulse 90   Temp 97.1 °F (36.2 °C)   Resp 18   SpO2 98%       PICC with positive blood return. Medications:  ertapenem    1053 Patient tolerated treatment well. Patient discharged from [place] [address] ambulatory in no distress. Patient aware of next appointment.

## 2019-12-22 ENCOUNTER — HOSPITAL ENCOUNTER (OUTPATIENT)
Dept: INFUSION THERAPY | Age: 59
Discharge: HOME | End: 2019-12-22
Payer: MEDICAID

## 2019-12-22 VITALS
HEART RATE: 69 BPM | DIASTOLIC BLOOD PRESSURE: 91 MMHG | TEMPERATURE: 97 F | SYSTOLIC BLOOD PRESSURE: 132 MMHG | RESPIRATION RATE: 18 BRPM

## 2019-12-22 PROCEDURE — 74011000258 HC RX REV CODE- 258: Performed by: INTERNAL MEDICINE

## 2019-12-22 PROCEDURE — 96365 THER/PROPH/DIAG IV INF INIT: CPT

## 2019-12-22 PROCEDURE — 74011250636 HC RX REV CODE- 250/636: Performed by: INTERNAL MEDICINE

## 2019-12-22 RX ADMIN — ERTAPENEM SODIUM 1 G: 1 INJECTION, POWDER, LYOPHILIZED, FOR SOLUTION INTRAMUSCULAR; INTRAVENOUS at 11:13

## 2019-12-23 ENCOUNTER — HOSPITAL ENCOUNTER (OUTPATIENT)
Dept: INFUSION THERAPY | Age: 59
Discharge: HOME | End: 2019-12-23
Payer: MEDICAID

## 2019-12-23 VITALS
RESPIRATION RATE: 18 BRPM | SYSTOLIC BLOOD PRESSURE: 143 MMHG | HEART RATE: 80 BPM | DIASTOLIC BLOOD PRESSURE: 80 MMHG | TEMPERATURE: 98.3 F

## 2019-12-23 LAB
ALBUMIN SERPL-MCNC: 3.4 G/DL (ref 3.5–5)
ALBUMIN SERPL-MCNC: 3.4 G/DL (ref 3.5–5)
ALBUMIN/GLOB SERPL: 0.8 {RATIO} (ref 1.1–2.2)
ALBUMIN/GLOB SERPL: 0.9 {RATIO} (ref 1.1–2.2)
ALP SERPL-CCNC: 148 U/L (ref 45–117)
ALP SERPL-CCNC: 150 U/L (ref 45–117)
ALT SERPL-CCNC: 38 U/L (ref 12–78)
ALT SERPL-CCNC: 38 U/L (ref 12–78)
ANION GAP SERPL CALC-SCNC: 7 MMOL/L (ref 5–15)
AST SERPL-CCNC: 20 U/L (ref 15–37)
AST SERPL-CCNC: 21 U/L (ref 15–37)
BASOPHILS # BLD: 0 K/UL (ref 0–0.1)
BASOPHILS NFR BLD: 0 % (ref 0–1)
BILIRUB DIRECT SERPL-MCNC: 0.1 MG/DL (ref 0–0.2)
BILIRUB SERPL-MCNC: 0.4 MG/DL (ref 0.2–1)
BILIRUB SERPL-MCNC: 0.4 MG/DL (ref 0.2–1)
BUN SERPL-MCNC: 14 MG/DL (ref 6–20)
BUN/CREAT SERPL: 13 (ref 12–20)
CALCIUM SERPL-MCNC: 8.6 MG/DL (ref 8.5–10.1)
CHLORIDE SERPL-SCNC: 108 MMOL/L (ref 97–108)
CO2 SERPL-SCNC: 25 MMOL/L (ref 21–32)
CREAT SERPL-MCNC: 1.04 MG/DL (ref 0.7–1.3)
DIFFERENTIAL METHOD BLD: ABNORMAL
EOSINOPHIL # BLD: 0.2 K/UL (ref 0–0.4)
EOSINOPHIL NFR BLD: 3 % (ref 0–7)
ERYTHROCYTE [DISTWIDTH] IN BLOOD BY AUTOMATED COUNT: 12.8 % (ref 11.5–14.5)
GLOBULIN SER CALC-MCNC: 3.9 G/DL (ref 2–4)
GLOBULIN SER CALC-MCNC: 4.1 G/DL (ref 2–4)
GLUCOSE SERPL-MCNC: 129 MG/DL (ref 65–100)
HCT VFR BLD AUTO: 41 % (ref 36.6–50.3)
HGB BLD-MCNC: 13.5 G/DL (ref 12.1–17)
IMM GRANULOCYTES # BLD AUTO: 0 K/UL (ref 0–0.04)
IMM GRANULOCYTES NFR BLD AUTO: 1 % (ref 0–0.5)
LYMPHOCYTES # BLD: 1.1 K/UL (ref 0.8–3.5)
LYMPHOCYTES NFR BLD: 17 % (ref 12–49)
MCH RBC QN AUTO: 29.7 PG (ref 26–34)
MCHC RBC AUTO-ENTMCNC: 32.9 G/DL (ref 30–36.5)
MCV RBC AUTO: 90.1 FL (ref 80–99)
MONOCYTES # BLD: 0.5 K/UL (ref 0–1)
MONOCYTES NFR BLD: 8 % (ref 5–13)
NEUTS SEG # BLD: 4.5 K/UL (ref 1.8–8)
NEUTS SEG NFR BLD: 71 % (ref 32–75)
NRBC # BLD: 0 K/UL (ref 0–0.01)
NRBC BLD-RTO: 0 PER 100 WBC
PLATELET # BLD AUTO: 214 K/UL (ref 150–400)
PMV BLD AUTO: 9.1 FL (ref 8.9–12.9)
POTASSIUM SERPL-SCNC: 4.6 MMOL/L (ref 3.5–5.1)
PROT SERPL-MCNC: 7.3 G/DL (ref 6.4–8.2)
PROT SERPL-MCNC: 7.5 G/DL (ref 6.4–8.2)
RBC # BLD AUTO: 4.55 M/UL (ref 4.1–5.7)
SODIUM SERPL-SCNC: 140 MMOL/L (ref 136–145)
WBC # BLD AUTO: 6.3 K/UL (ref 4.1–11.1)

## 2019-12-23 PROCEDURE — 96365 THER/PROPH/DIAG IV INF INIT: CPT

## 2019-12-23 PROCEDURE — 36415 COLL VENOUS BLD VENIPUNCTURE: CPT

## 2019-12-23 PROCEDURE — 85025 COMPLETE CBC W/AUTO DIFF WBC: CPT

## 2019-12-23 PROCEDURE — 74011250636 HC RX REV CODE- 250/636: Performed by: INTERNAL MEDICINE

## 2019-12-23 PROCEDURE — 80053 COMPREHEN METABOLIC PANEL: CPT

## 2019-12-23 PROCEDURE — 74011000258 HC RX REV CODE- 258: Performed by: INTERNAL MEDICINE

## 2019-12-23 PROCEDURE — 80076 HEPATIC FUNCTION PANEL: CPT

## 2019-12-23 RX ORDER — SODIUM CHLORIDE 0.9 % (FLUSH) 0.9 %
5-10 SYRINGE (ML) INJECTION AS NEEDED
Status: DISCONTINUED | OUTPATIENT
Start: 2019-12-23 | End: 2019-12-24 | Stop reason: HOSPADM

## 2019-12-23 RX ORDER — HEPARIN 100 UNIT/ML
500 SYRINGE INTRAVENOUS AS NEEDED
Status: DISCONTINUED | OUTPATIENT
Start: 2019-12-23 | End: 2019-12-24 | Stop reason: HOSPADM

## 2019-12-23 RX ADMIN — ERTAPENEM SODIUM 1 G: 1 INJECTION, POWDER, LYOPHILIZED, FOR SOLUTION INTRAMUSCULAR; INTRAVENOUS at 09:15

## 2019-12-23 RX ADMIN — Medication 10 ML: at 09:45

## 2019-12-23 RX ADMIN — Medication 10 ML: at 09:15

## 2019-12-23 RX ADMIN — Medication 500 UNITS: at 09:45

## 2019-12-23 NOTE — PROGRESS NOTES
[facility] Short Note                       Date: 2019    Name: Sarah Means III    MRN: [id number]         : 1960    0910 Pt admit to [facility] for daily Invanz ambulatory in stable condition. Assessment completed. No new concerns voiced. Mr. Corcoran's vitals were reviewed prior to treatment. Patient Vitals for the past 12 hrs:   Temp Pulse Resp BP   19 0910 98.3 °F (36.8 °C) 80 18 143/80       PICC with positive blood return flushed, labs drawn and sent per orders, at conclusion line flushed heparinized and capped per protocol. Medications given:   Medications Administered     ertapenem (INVANZ) 1 g in 0.9% sodium chloride (MBP/ADV) 50 mL     Admin Date  2019 Action  New Bag Dose  1 g Rate  100 mL/hr Route  IntraVENous Administered By  Josy Dial, BRENNA          sodium chloride (NS) flush 5-10 mL     Admin Date  2019 Action  Given Dose  10 mL Route  IntraVENous Administered By  Josy Dial, BRENNA                   5301 Pt tolerated treatment well. D/c home ambulatory in no distress.      Future Appointments   Date Time Provider Susan Ro   2019  9:00 AM [facility]   2019  9:00 AM [facility]   2019 10:30 AM [facility] [place] [facility] ER 3 [facility]   2019  4:00 PM [facility] NURSE [facility]   2019  4:00 PM [facility] NURSE 6 [facility]   2019  9:00 AM [facility]ION NURSE 1 [facility]   2019  9:00 AM [facility]ION NURSE 1 [facility]   2019  4:00 PM [facility]ION NURSE 6 [facility]   2020 11:30 AM Roxy Sahu DO [facility], RN  2019  4:45 PM

## 2019-12-24 ENCOUNTER — HOSPITAL ENCOUNTER (OUTPATIENT)
Dept: INFUSION THERAPY | Age: 59
Discharge: HOME | End: 2019-12-24
Payer: MEDICAID

## 2019-12-24 VITALS
DIASTOLIC BLOOD PRESSURE: 82 MMHG | HEART RATE: 90 BPM | TEMPERATURE: 97.7 F | RESPIRATION RATE: 18 BRPM | SYSTOLIC BLOOD PRESSURE: 131 MMHG

## 2019-12-24 PROCEDURE — 74011000258 HC RX REV CODE- 258: Performed by: INTERNAL MEDICINE

## 2019-12-24 PROCEDURE — 74011250636 HC RX REV CODE- 250/636: Performed by: INTERNAL MEDICINE

## 2019-12-24 PROCEDURE — 96365 THER/PROPH/DIAG IV INF INIT: CPT

## 2019-12-24 RX ADMIN — ERTAPENEM SODIUM 1 G: 1 INJECTION, POWDER, LYOPHILIZED, FOR SOLUTION INTRAMUSCULAR; INTRAVENOUS at 08:18

## 2019-12-24 NOTE — PROGRESS NOTES
Outpatient Infusion Center Short Visit Progress Note    0815 Pt admit to [facility] for daily antibotics ambulatory in stable condition. Assessment completed. No new concerns voiced. Patient Vitals for the past 12 hrs:   Temp Pulse Resp BP   12/24/19 0818 97.7 °F (36.5 °C) 90 18 131/82       PICC with positive blood return flushed, heparinized and capped per protocol. Medications:  Medications Administered     ertapenem (INVANZ) 1 g in 0.9% sodium chloride (MBP/ADV) 50 mL     Admin Date  12/24/2019 Action  New Bag Dose  1 g Rate  100 mL/hr Route  IntraVENous Administered By  Omayra Ryan RN                0900 Pt tolerated treatment well. D/c home ambulatory in no distress. Pt aware of next appointment scheduled for 12/25/19.

## 2019-12-26 ENCOUNTER — HOSPITAL ENCOUNTER (EMERGENCY)
Age: 59
Discharge: HOME | End: 2019-12-26
Attending: EMERGENCY MEDICINE | Admitting: EMERGENCY MEDICINE
Payer: MEDICAID

## 2019-12-26 ENCOUNTER — HOSPITAL ENCOUNTER (OUTPATIENT)
Dept: CT IMAGING | Age: 59
Discharge: HOME | End: 2019-12-26
Attending: INTERNAL MEDICINE
Payer: MEDICAID

## 2019-12-26 VITALS
HEART RATE: 85 BPM | RESPIRATION RATE: 23 BRPM | DIASTOLIC BLOOD PRESSURE: 82 MMHG | OXYGEN SATURATION: 97 % | TEMPERATURE: 99.1 F | SYSTOLIC BLOOD PRESSURE: 142 MMHG

## 2019-12-26 DIAGNOSIS — I26.99 OTHER PULMONARY EMBOLISM WITHOUT ACUTE COR PULMONALE, UNSPECIFIED CHRONICITY (HCC): Primary | ICD-10-CM

## 2019-12-26 DIAGNOSIS — K75.0 LIVER ABSCESS: ICD-10-CM

## 2019-12-26 LAB
ALBUMIN SERPL-MCNC: 3.4 G/DL (ref 3.5–5)
ALBUMIN/GLOB SERPL: 0.8 {RATIO} (ref 1.1–2.2)
ALP SERPL-CCNC: 153 U/L (ref 45–117)
ALT SERPL-CCNC: 30 U/L (ref 12–78)
ANION GAP SERPL CALC-SCNC: 7 MMOL/L (ref 5–15)
AST SERPL-CCNC: 15 U/L (ref 15–37)
ATRIAL RATE: 83 BPM
BASOPHILS # BLD: 0 K/UL (ref 0–0.1)
BASOPHILS NFR BLD: 0 % (ref 0–1)
BILIRUB SERPL-MCNC: 0.4 MG/DL (ref 0.2–1)
BNP SERPL-MCNC: 213 PG/ML
BUN SERPL-MCNC: 12 MG/DL (ref 6–20)
BUN/CREAT SERPL: 11 (ref 12–20)
CALCIUM SERPL-MCNC: 8.8 MG/DL (ref 8.5–10.1)
CALCULATED P AXIS, ECG09: -9 DEGREES
CALCULATED R AXIS, ECG10: 32 DEGREES
CALCULATED T AXIS, ECG11: -19 DEGREES
CHLORIDE SERPL-SCNC: 106 MMOL/L (ref 97–108)
CO2 SERPL-SCNC: 24 MMOL/L (ref 21–32)
COMMENT, HOLDF: NORMAL
CREAT SERPL-MCNC: 1.06 MG/DL (ref 0.7–1.3)
DIAGNOSIS, 93000: NORMAL
DIFFERENTIAL METHOD BLD: ABNORMAL
EOSINOPHIL # BLD: 0.3 K/UL (ref 0–0.4)
EOSINOPHIL NFR BLD: 4 % (ref 0–7)
ERYTHROCYTE [DISTWIDTH] IN BLOOD BY AUTOMATED COUNT: 12.8 % (ref 11.5–14.5)
GLOBULIN SER CALC-MCNC: 4.2 G/DL (ref 2–4)
GLUCOSE SERPL-MCNC: 202 MG/DL (ref 65–100)
HCT VFR BLD AUTO: 41 % (ref 36.6–50.3)
HGB BLD-MCNC: 13.8 G/DL (ref 12.1–17)
IMM GRANULOCYTES # BLD AUTO: 0 K/UL (ref 0–0.04)
IMM GRANULOCYTES NFR BLD AUTO: 1 % (ref 0–0.5)
LYMPHOCYTES # BLD: 1.6 K/UL (ref 0.8–3.5)
LYMPHOCYTES NFR BLD: 20 % (ref 12–49)
MCH RBC QN AUTO: 30.3 PG (ref 26–34)
MCHC RBC AUTO-ENTMCNC: 33.7 G/DL (ref 30–36.5)
MCV RBC AUTO: 90.1 FL (ref 80–99)
MONOCYTES # BLD: 0.5 K/UL (ref 0–1)
MONOCYTES NFR BLD: 6 % (ref 5–13)
NEUTS SEG # BLD: 5.7 K/UL (ref 1.8–8)
NEUTS SEG NFR BLD: 69 % (ref 32–75)
NRBC # BLD: 0 K/UL (ref 0–0.01)
NRBC BLD-RTO: 0 PER 100 WBC
P-R INTERVAL, ECG05: 168 MS
PLATELET # BLD AUTO: 229 K/UL (ref 150–400)
PMV BLD AUTO: 8.9 FL (ref 8.9–12.9)
POTASSIUM SERPL-SCNC: 4.3 MMOL/L (ref 3.5–5.1)
PROT SERPL-MCNC: 7.6 G/DL (ref 6.4–8.2)
Q-T INTERVAL, ECG07: 358 MS
QRS DURATION, ECG06: 84 MS
QTC CALCULATION (BEZET), ECG08: 420 MS
RBC # BLD AUTO: 4.55 M/UL (ref 4.1–5.7)
SAMPLES BEING HELD,HOLD: NORMAL
SODIUM SERPL-SCNC: 137 MMOL/L (ref 136–145)
TROPONIN I SERPL-MCNC: <0.05 NG/ML
VENTRICULAR RATE, ECG03: 83 BPM
WBC # BLD AUTO: 8.2 K/UL (ref 4.1–11.1)

## 2019-12-26 PROCEDURE — 84484 ASSAY OF TROPONIN QUANT: CPT

## 2019-12-26 PROCEDURE — 80053 COMPREHEN METABOLIC PANEL: CPT

## 2019-12-26 PROCEDURE — 74177 CT ABD & PELVIS W/CONTRAST: CPT

## 2019-12-26 PROCEDURE — 74011636320 HC RX REV CODE- 636/320: Performed by: RADIOLOGY

## 2019-12-26 PROCEDURE — 99284 EMERGENCY DEPT VISIT MOD MDM: CPT

## 2019-12-26 PROCEDURE — 74011000258 HC RX REV CODE- 258: Performed by: EMERGENCY MEDICINE

## 2019-12-26 PROCEDURE — 96365 THER/PROPH/DIAG IV INF INIT: CPT

## 2019-12-26 PROCEDURE — 93005 ELECTROCARDIOGRAM TRACING: CPT

## 2019-12-26 PROCEDURE — 71260 CT THORAX DX C+: CPT

## 2019-12-26 PROCEDURE — 74011000258 HC RX REV CODE- 258: Performed by: RADIOLOGY

## 2019-12-26 PROCEDURE — 83880 ASSAY OF NATRIURETIC PEPTIDE: CPT

## 2019-12-26 PROCEDURE — 36415 COLL VENOUS BLD VENIPUNCTURE: CPT

## 2019-12-26 PROCEDURE — 74011250637 HC RX REV CODE- 250/637: Performed by: EMERGENCY MEDICINE

## 2019-12-26 PROCEDURE — 74011250636 HC RX REV CODE- 250/636: Performed by: EMERGENCY MEDICINE

## 2019-12-26 PROCEDURE — 85025 COMPLETE CBC W/AUTO DIFF WBC: CPT

## 2019-12-26 RX ORDER — SODIUM CHLORIDE 0.9 % (FLUSH) 0.9 %
10 SYRINGE (ML) INJECTION
Status: COMPLETED | OUTPATIENT
Start: 2019-12-26 | End: 2019-12-26

## 2019-12-26 RX ADMIN — SODIUM CHLORIDE 100 ML: 900 INJECTION, SOLUTION INTRAVENOUS at 11:36

## 2019-12-26 RX ADMIN — ERTAPENEM SODIUM 1 G: 1 INJECTION, POWDER, LYOPHILIZED, FOR SOLUTION INTRAMUSCULAR; INTRAVENOUS at 15:02

## 2019-12-26 RX ADMIN — Medication 10 ML: at 11:36

## 2019-12-26 RX ADMIN — IOPAMIDOL 100 ML: 755 INJECTION, SOLUTION INTRAVENOUS at 11:36

## 2019-12-26 RX ADMIN — APIXABAN 10 MG: 5 TABLET, FILM COATED ORAL at 14:47

## 2019-12-26 NOTE — ED PROVIDER NOTES
62 y.o. male with past medical history significant for Diverticulitis who presents from CT with chief complaint of Abnormal CT Scan. Pt was getting a follow-up CT scan for a previous liver infection but was referred to the ED during his CT visit due to Radiology finding a PE in his lung. Pt notes that he was having some chest pain a few days ago, but denies any current chest pain. Pt also reports some associated fatigue. Pt is currently receiving treatment for a liver infection for which he was seen in the ED about a month ago. Pt states that a cyst in his liver was drained and that he was discharged with a PICC line, antibiotics, and instructions to go to the infusion center to receive his treatment. Pt is has been following up with Dr. Kaye Gilbert, Infectious Diseases. Pt denies any SOB or blood in his stool. There are no other acute medical concerns at this time. Social hx: Current tobacco use (0.5 packs/day). Occasional EtOH consumption. PCP: Jelena Barrios MD  Infectious Diseases: Vallie Scale, DO    Note written by Mara Squires, as dictated by Michelle Baptiste MD 2:40 PM      The history is provided by the patient and medical records. Past Medical History:   Diagnosis Date    Ill-defined condition     \"hole in intestines, liver infection\"    Knee pain        Past Surgical History:   Procedure Laterality Date    HX GI      hernia repair    HX HEENT      tonsils removed    HX ORTHOPAEDIC      right knee surgery         History reviewed. No pertinent family history.     Social History     Socioeconomic History    Marital status: SINGLE     Spouse name: Not on file    Number of children: Not on file    Years of education: Not on file    Highest education level: Not on file   Occupational History    Not on file   Social Needs    Financial resource strain: Not on file    Food insecurity:     Worry: Not on file     Inability: Not on file    Transportation needs:     Medical: Not on file     Non-medical: Not on file   Tobacco Use    Smoking status: Current Every Day Smoker     Packs/day: 0.50    Smokeless tobacco: Never Used   Substance and Sexual Activity    Alcohol use: Yes     Comment: occ    Drug use: No    Sexual activity: Not on file   Lifestyle    Physical activity:     Days per week: Not on file     Minutes per session: Not on file    Stress: Not on file   Relationships    Social connections:     Talks on phone: Not on file     Gets together: Not on file     Attends Latter-day service: Not on file     Active member of club or organization: Not on file     Attends meetings of clubs or organizations: Not on file     Relationship status: Not on file    Intimate partner violence:     Fear of current or ex partner: Not on file     Emotionally abused: Not on file     Physically abused: Not on file     Forced sexual activity: Not on file   Other Topics Concern    Not on file   Social History Narrative    Not on file         ALLERGIES: Patient has no known allergies. Review of Systems   Constitutional: Positive for fatigue. Negative for diaphoresis and fever. HENT: Negative for facial swelling. Eyes: Negative for visual disturbance. Respiratory: Negative for cough and shortness of breath. Cardiovascular: Positive for chest pain. Gastrointestinal: Negative for abdominal pain and blood in stool. Genitourinary: Negative for dysuria. Musculoskeletal: Negative for joint swelling. Skin: Negative for rash. Neurological: Negative for headaches. Hematological: Negative for adenopathy. Psychiatric/Behavioral: Negative for suicidal ideas. Vitals:    12/26/19 1324   BP: 149/89   Pulse: 88   Resp: 16   Temp: 99.1 °F (37.3 °C)            Physical Exam  Vitals signs and nursing note reviewed. Constitutional:       General: He is not in acute distress. Appearance: He is well-developed. HENT:      Head: Normocephalic and atraumatic.    Eyes:      Pupils: Pupils are equal, round, and reactive to light. Neck:      Musculoskeletal: Normal range of motion and neck supple. Cardiovascular:      Rate and Rhythm: Normal rate and regular rhythm. Heart sounds: Normal heart sounds. Comments: PICC line in right upper extremity  Pulmonary:      Effort: Pulmonary effort is normal. No respiratory distress. Breath sounds: Normal breath sounds. Abdominal:      General: Bowel sounds are normal. There is no distension. Palpations: Abdomen is soft. Tenderness: There is no tenderness. Musculoskeletal: Normal range of motion. Skin:     General: Skin is warm and dry. Neurological:      Mental Status: He is alert and oriented to person, place, and time. Note written by Mara Steve, as dictated by Nanette Mckeon MD 2:40 PM    MDM  Number of Diagnoses or Management Options  Other pulmonary embolism without acute cor pulmonale, unspecified chronicity [facility]):   Diagnosis management comments: A:   Incidentally noted PE during CT chest/a/p with contrast to assess liver abscess. Pt with no symptoms. VS stable. No evidence of R heart strain. Will start eliquis. F/u with PCP.            Procedures

## 2019-12-26 NOTE — ED TRIAGE NOTES
Pt sent here for a \"blood clot\", pt denies cp or sob, did have some cp days ago , denies fever, denies any new lung pain

## 2019-12-26 NOTE — DISCHARGE INSTRUCTIONS
Patient Education        Pulmonary Embolism: Care Instructions  Your Care Instructions    Pulmonary embolism is the sudden blockage of an artery in the lung. Blood clots in the deep veins of the leg or pelvis (deep vein thrombosis, or DVT) are the most common cause. These blood clots can travel to the lungs. Pulmonary embolism can be very serious. Because you have had one pulmonary embolism, you are at greater risk for having another one. But you can take steps to prevent another pulmonary embolism by following your doctor's instructions. You will probably take a prescription blood-thinning medicine to prevent blood clots. A blood thinner can stop a blood clot from growing larger and prevent new clots from forming. Follow-up care is a key part of your treatment and safety. Be sure to make and go to all appointments, and call your doctor if you are having problems. It's also a good idea to know your test results and keep a list of the medicines you take. How can you care for yourself at home? · Take your medicines exactly as prescribed. Call your doctor if you think you are having a problem with your medicine. You will get more details on the specific medicines your doctor prescribes. · If you are taking a blood thinner, be sure you get instructions about how to take your medicine safely. Blood thinners can cause serious bleeding problems. Preventing future pulmonary embolisms  · Exercise. Keep blood moving in your legs to keep clots from forming. If you are traveling by car, stop every hour or so. Get out and walk around for a few minutes. If you are traveling by bus, train, or plane, get out of your seat and walk up and down the aisles every hour or so. You also can do leg exercises while you are seated. Pump your feet up and down by pulling your toes up toward your knees then pointing them down. · Get up out of bed as soon as possible after an illness or surgery. · Do not smoke.  If you need help quitting, talk to your doctor about stop-smoking programs and medicines. These can increase your chances of quitting for good. · Check with your doctor before taking hormone or birth control pills. These may increase your risk of blot clots. · Ask your doctor about wearing compression stockings to help prevent blood clots in your legs. There are different types of stockings, and they need to fit right. So your doctor will recommend what you need. When should you call for help? Call 911 anytime you think you may need emergency care. For example, call if:    · You have shortness of breath.     · You have chest pain.     · You passed out (lost consciousness).     · You cough up blood.    Call your doctor now or seek immediate medical care if:    · You have new or worsening pain or swelling in your leg.    Watch closely for changes in your health, and be sure to contact your doctor if:    · You do not get better as expected. Where can you learn more? Go to http://leonela-isaiah.info/. Enter H357 in the search box to learn more about \"Pulmonary Embolism: Care Instructions. \"  Current as of: September 26, 2018  Content Version: 12.2  © 8146-9308 [employer]. Care instructions adapted under license by [employer] (which disclaims liability or warranty for this information). If you have questions about a medical condition or this instruction, always ask your healthcare professional. [address] any warranty or liability for your use of this information.

## 2019-12-26 NOTE — ED NOTES
Discharge instructions given to pt. All questions answered and pt verbalized understanding. V/S stable @ time of discharge. Pt ambulatory out of unit. Patient's PICC line flushed and capped.

## 2019-12-27 ENCOUNTER — TELEPHONE (OUTPATIENT)
Dept: FAMILY MEDICINE CLINIC | Age: 59
End: 2019-12-27

## 2019-12-27 ENCOUNTER — HOSPITAL ENCOUNTER (OUTPATIENT)
Dept: INFUSION THERAPY | Age: 59
Discharge: HOME | End: 2019-12-27
Payer: MEDICAID

## 2019-12-27 VITALS
HEART RATE: 79 BPM | DIASTOLIC BLOOD PRESSURE: 89 MMHG | SYSTOLIC BLOOD PRESSURE: 143 MMHG | TEMPERATURE: 98.1 F | RESPIRATION RATE: 18 BRPM

## 2019-12-27 PROCEDURE — 96365 THER/PROPH/DIAG IV INF INIT: CPT

## 2019-12-27 PROCEDURE — 74011250636 HC RX REV CODE- 250/636: Performed by: INTERNAL MEDICINE

## 2019-12-27 PROCEDURE — 74011000258 HC RX REV CODE- 258: Performed by: INTERNAL MEDICINE

## 2019-12-27 RX ADMIN — ERTAPENEM SODIUM 1 G: 1 INJECTION, POWDER, LYOPHILIZED, FOR SOLUTION INTRAMUSCULAR; INTRAVENOUS at 10:40

## 2019-12-27 NOTE — PROGRESS NOTES
Outpatient Infusion Center Short Visit Progress Note    3009 Pt admit to [facility] for daily Invanz ambulatory in stable condition. Assessment completed. Pt visited ED yesterday, 12/26; possible PE; notified wilfred FRENCH to treat received and pt is to continue w/ daily antibiotics until 01/07/2020. No new concerns voiced. Single lumen PICC flushed w/ positive blood return. Patient Vitals for the past 12 hrs:   Temp Pulse Resp BP   12/27/19 1034 98.1 °F (36.7 °C) 79 18 143/89     Medications:  Invanz 1 g    1115 Pt tolerated treatment well. D/c home ambulatory in no distress. Pt aware of next appointment scheduled for 12/28/2019.

## 2019-12-27 NOTE — TELEPHONE ENCOUNTER
Pls call Lizzy Smith at [facility]fusion     Pt came early for his apptmnt   She states he was seen in ER for PE   She wants to confirm that it is okay for him to receive his infusion today       Best number to reach her is [phone]

## 2019-12-28 ENCOUNTER — HOSPITAL ENCOUNTER (OUTPATIENT)
Dept: INFUSION THERAPY | Age: 59
Discharge: HOME | End: 2019-12-28
Payer: MEDICAID

## 2019-12-28 VITALS
DIASTOLIC BLOOD PRESSURE: 79 MMHG | SYSTOLIC BLOOD PRESSURE: 120 MMHG | RESPIRATION RATE: 18 BRPM | TEMPERATURE: 98.1 F | HEART RATE: 100 BPM

## 2019-12-28 PROCEDURE — 74011250636 HC RX REV CODE- 250/636: Performed by: INTERNAL MEDICINE

## 2019-12-28 PROCEDURE — 96365 THER/PROPH/DIAG IV INF INIT: CPT

## 2019-12-28 PROCEDURE — 74011000258 HC RX REV CODE- 258: Performed by: INTERNAL MEDICINE

## 2019-12-28 PROCEDURE — 77030020847 HC STATLOK BARD -A

## 2019-12-28 RX ORDER — HEPARIN 100 UNIT/ML
500 SYRINGE INTRAVENOUS AS NEEDED
Status: DISCONTINUED | OUTPATIENT
Start: 2019-12-28 | End: 2019-12-29 | Stop reason: HOSPADM

## 2019-12-28 RX ORDER — SODIUM CHLORIDE 0.9 % (FLUSH) 0.9 %
5-10 SYRINGE (ML) INJECTION AS NEEDED
Status: DISCONTINUED | OUTPATIENT
Start: 2019-12-28 | End: 2019-12-29 | Stop reason: HOSPADM

## 2019-12-28 RX ADMIN — ERTAPENEM SODIUM 1 G: 1 INJECTION, POWDER, LYOPHILIZED, FOR SOLUTION INTRAMUSCULAR; INTRAVENOUS at 11:42

## 2019-12-28 RX ADMIN — Medication 10 ML: at 12:15

## 2019-12-28 RX ADMIN — Medication 500 UNITS: at 12:15

## 2019-12-28 RX ADMIN — Medication 10 ML: at 11:37

## 2019-12-28 NOTE — PROGRESS NOTES
[facility] Progress Note    Date: 2019    Name: Amy Rooney III    MRN: [id number]         : 1960    1135. Mr. Hari Quesada Arrived ambulatory and in no distress for Daily Antibitoics. Assessment was completed, reports 7/10 pain to LLE- chronic. Patient denies CP/SOB, reports feeling well other than pain. RUE PICC with + blood return. Dressing changed per protocol by Abraham Kiser RN. Patient Vitals for the past 12 hrs:   Temp Pulse Resp BP   19 1140 98.1 °F (36.7 °C) 100 18 120/79       Medications:  Invanz IV     1215. Mr. Hari Quesada tolerated treatment well and was discharged from [place] [address] in stable condition. He is to return on 19 for his next appointment.     Chrissy Klein RN  2019

## 2019-12-30 ENCOUNTER — HOSPITAL ENCOUNTER (OUTPATIENT)
Dept: INFUSION THERAPY | Age: 59
Discharge: HOME | End: 2019-12-30
Payer: MEDICAID

## 2019-12-30 VITALS
RESPIRATION RATE: 18 BRPM | SYSTOLIC BLOOD PRESSURE: 133 MMHG | HEART RATE: 99 BPM | TEMPERATURE: 98.2 F | DIASTOLIC BLOOD PRESSURE: 82 MMHG

## 2019-12-30 LAB
ALBUMIN SERPL-MCNC: 3.3 G/DL (ref 3.5–5)
ALBUMIN/GLOB SERPL: 0.9 {RATIO} (ref 1.1–2.2)
ALP SERPL-CCNC: 144 U/L (ref 45–117)
ALT SERPL-CCNC: 41 U/L (ref 12–78)
ANION GAP SERPL CALC-SCNC: 7 MMOL/L (ref 5–15)
AST SERPL-CCNC: 28 U/L (ref 15–37)
BASOPHILS # BLD: 0 K/UL (ref 0–0.1)
BASOPHILS NFR BLD: 0 % (ref 0–1)
BILIRUB DIRECT SERPL-MCNC: <0.1 MG/DL (ref 0–0.2)
BILIRUB SERPL-MCNC: 0.3 MG/DL (ref 0.2–1)
BUN SERPL-MCNC: 12 MG/DL (ref 6–20)
BUN/CREAT SERPL: 14 (ref 12–20)
CALCIUM SERPL-MCNC: 9 MG/DL (ref 8.5–10.1)
CHLORIDE SERPL-SCNC: 106 MMOL/L (ref 97–108)
CO2 SERPL-SCNC: 27 MMOL/L (ref 21–32)
CREAT SERPL-MCNC: 0.85 MG/DL (ref 0.7–1.3)
DIFFERENTIAL METHOD BLD: ABNORMAL
EOSINOPHIL # BLD: 0.2 K/UL (ref 0–0.4)
EOSINOPHIL NFR BLD: 3 % (ref 0–7)
ERYTHROCYTE [DISTWIDTH] IN BLOOD BY AUTOMATED COUNT: 12.8 % (ref 11.5–14.5)
GLOBULIN SER CALC-MCNC: 3.6 G/DL (ref 2–4)
GLUCOSE SERPL-MCNC: 148 MG/DL (ref 65–100)
HCT VFR BLD AUTO: 41.6 % (ref 36.6–50.3)
HGB BLD-MCNC: 13.6 G/DL (ref 12.1–17)
IMM GRANULOCYTES # BLD AUTO: 0 K/UL (ref 0–0.04)
IMM GRANULOCYTES NFR BLD AUTO: 1 % (ref 0–0.5)
LYMPHOCYTES # BLD: 1.4 K/UL (ref 0.8–3.5)
LYMPHOCYTES NFR BLD: 21 % (ref 12–49)
MCH RBC QN AUTO: 29.3 PG (ref 26–34)
MCHC RBC AUTO-ENTMCNC: 32.7 G/DL (ref 30–36.5)
MCV RBC AUTO: 89.7 FL (ref 80–99)
MONOCYTES # BLD: 0.5 K/UL (ref 0–1)
MONOCYTES NFR BLD: 7 % (ref 5–13)
NEUTS SEG # BLD: 4.6 K/UL (ref 1.8–8)
NEUTS SEG NFR BLD: 68 % (ref 32–75)
NRBC # BLD: 0 K/UL (ref 0–0.01)
NRBC BLD-RTO: 0 PER 100 WBC
PLATELET # BLD AUTO: 236 K/UL (ref 150–400)
PMV BLD AUTO: 8.8 FL (ref 8.9–12.9)
POTASSIUM SERPL-SCNC: 4.1 MMOL/L (ref 3.5–5.1)
PROT SERPL-MCNC: 6.9 G/DL (ref 6.4–8.2)
RBC # BLD AUTO: 4.64 M/UL (ref 4.1–5.7)
SODIUM SERPL-SCNC: 140 MMOL/L (ref 136–145)
WBC # BLD AUTO: 6.8 K/UL (ref 4.1–11.1)

## 2019-12-30 PROCEDURE — 74011000258 HC RX REV CODE- 258: Performed by: INTERNAL MEDICINE

## 2019-12-30 PROCEDURE — 80076 HEPATIC FUNCTION PANEL: CPT

## 2019-12-30 PROCEDURE — 85025 COMPLETE CBC W/AUTO DIFF WBC: CPT

## 2019-12-30 PROCEDURE — 36415 COLL VENOUS BLD VENIPUNCTURE: CPT

## 2019-12-30 PROCEDURE — 96365 THER/PROPH/DIAG IV INF INIT: CPT

## 2019-12-30 PROCEDURE — 80048 BASIC METABOLIC PNL TOTAL CA: CPT

## 2019-12-30 PROCEDURE — 74011250636 HC RX REV CODE- 250/636: Performed by: INTERNAL MEDICINE

## 2019-12-30 RX ADMIN — ERTAPENEM SODIUM 1 G: 1 INJECTION, POWDER, LYOPHILIZED, FOR SOLUTION INTRAMUSCULAR; INTRAVENOUS at 17:15

## 2019-12-30 NOTE — PROGRESS NOTES
[facility] Short Note                       Date: 2019    Name: Rosalind Starch III    MRN: [id number]         : 1960    1700 Pt admit to [facility] for [facility] ambulatory in stable condition. Assessment completed. No new concerns voiced. Please review pending lab results in [address] [place] [address]. Mr. Corcoran's vitals were reviewed prior to treatment. Patient Vitals for the past 12 hrs:   Temp Pulse Resp BP   19 1700 98.2 °F (36.8 °C) 99 18 133/82       PIcC with positive blood return. Lab drawn, flushed, heparinized and capped per protocol. Medications given:   Medications Administered     ertapenem (INVANZ) 1 g in 0.9% sodium chloride (MBP/ADV) 50 mL     Admin Date  2019 Action  New Bag Dose  1 g Rate  100 mL/hr Route  IntraVENous Administered By  Bulmaro Torres RN                   1800 Pt tolerated treatment well. D/c home ambulatory in no distress.      Future Appointments   Date Time Provider Susan Ro   2019  8:30 AM [facility]   2020  8:30 AM [facility]   2020  4:00 PM [facility]   1/3/2020  4:00 PM [facility]   2020  8:00 AM [facility]   2020  9:30 AM [facility]   2020  4:00 PM [facility]   2020 11:30 AM Iam Alarcon DO [address]   2020  4:00  [facility] INFUSION NURSE 6 2764 Gema Medrano RN  2019  6:24 PM

## 2019-12-30 NOTE — TELEPHONE ENCOUNTER
Per Dr Derrell Balbuena:  Humphrey Fernandez to [employer] [facility] at [facility] . Ok to continue the antibiotics     He needs to follow wt PCP for PE. ER notes indicate he was discharged on Eliquis for PE.  If he has any symptoms of shortness of breath, chest pain etc ,needs to go to the ER.

## 2019-12-31 ENCOUNTER — HOSPITAL ENCOUNTER (OUTPATIENT)
Dept: INFUSION THERAPY | Age: 59
Discharge: HOME | End: 2019-12-31
Payer: MEDICAID

## 2019-12-31 VITALS
DIASTOLIC BLOOD PRESSURE: 92 MMHG | TEMPERATURE: 96.3 F | RESPIRATION RATE: 18 BRPM | HEART RATE: 86 BPM | SYSTOLIC BLOOD PRESSURE: 144 MMHG

## 2019-12-31 PROCEDURE — 74011000258 HC RX REV CODE- 258: Performed by: INTERNAL MEDICINE

## 2019-12-31 PROCEDURE — 74011250636 HC RX REV CODE- 250/636: Performed by: INTERNAL MEDICINE

## 2019-12-31 PROCEDURE — 96365 THER/PROPH/DIAG IV INF INIT: CPT

## 2019-12-31 RX ORDER — HEPARIN 100 UNIT/ML
500 SYRINGE INTRAVENOUS AS NEEDED
Status: DISCONTINUED | OUTPATIENT
Start: 2019-12-31 | End: 2020-01-01 | Stop reason: HOSPADM

## 2019-12-31 RX ORDER — SODIUM CHLORIDE 9 MG/ML
10 INJECTION INTRAMUSCULAR; INTRAVENOUS; SUBCUTANEOUS AS NEEDED
Status: DISCONTINUED | OUTPATIENT
Start: 2019-12-31 | End: 2020-01-01 | Stop reason: HOSPADM

## 2019-12-31 RX ADMIN — HEPARIN 500 UNITS: 100 SYRINGE at 10:30

## 2019-12-31 RX ADMIN — SODIUM CHLORIDE 10 ML: 9 INJECTION INTRAMUSCULAR; INTRAVENOUS; SUBCUTANEOUS at 10:31

## 2019-12-31 RX ADMIN — SODIUM CHLORIDE 10 ML: 9 INJECTION INTRAMUSCULAR; INTRAVENOUS; SUBCUTANEOUS at 10:30

## 2019-12-31 RX ADMIN — ERTAPENEM SODIUM 1 G: 1 INJECTION, POWDER, LYOPHILIZED, FOR SOLUTION INTRAMUSCULAR; INTRAVENOUS at 09:57

## 2019-12-31 NOTE — PROGRESS NOTES
[facility] Short Visit Note:    1000:  Pt arrived ambulatory and in no distress for Daily INVANZ Single lumen PICC with positive blood return and tolerated well. D/Cd from [facility] ambulatory and in no distress. Next visit 1/1/20.   Medications Administered     0.9% sodium chloride injection 10 mL     Admin Date  12/31/2019 Action  Given Dose  10 mL Route  IntraVENous Administered By  Mazin Jaimes RN           Admin Date  12/31/2019 Action  Given Dose  10 mL Route  IntraVENous Administered By  Mazin Jaimes RN          ertapenem [facility]) 1 g in 0.9% sodium chloride (MBP/ADV) 50 mL     Admin Date  12/31/2019 Action  New Bag Dose  1 g Rate  100 mL/hr Route  IntraVENous Administered By  Steven Suarez RN          heparin (porcine) pf 500 Units     Admin Date  12/31/2019 Action  Given Dose  500 Units Route  IntraVENous Administered By  Mazin Jaimes RN              Visit Vitals  BP (!) 144/92   Pulse 86   Temp 96.3 °F (35.7 °C)   Resp 18

## 2020-01-01 ENCOUNTER — HOSPITAL ENCOUNTER (EMERGENCY)
Age: 60
Discharge: HOME | End: 2020-01-01
Attending: EMERGENCY MEDICINE | Admitting: EMERGENCY MEDICINE
Payer: MEDICAID

## 2020-01-01 VITALS
HEART RATE: 98 BPM | OXYGEN SATURATION: 96 % | SYSTOLIC BLOOD PRESSURE: 160 MMHG | RESPIRATION RATE: 18 BRPM | BODY MASS INDEX: 32.55 KG/M2 | HEIGHT: 72 IN | TEMPERATURE: 98.2 F | DIASTOLIC BLOOD PRESSURE: 78 MMHG | WEIGHT: 240.3 LBS

## 2020-01-01 DIAGNOSIS — Z79.2 ENCOUNTER FOR LONG-TERM (CURRENT) USE OF ANTIBIOTICS: Primary | ICD-10-CM

## 2020-01-01 PROCEDURE — 96365 THER/PROPH/DIAG IV INF INIT: CPT

## 2020-01-01 PROCEDURE — 74011000258 HC RX REV CODE- 258: Performed by: EMERGENCY MEDICINE

## 2020-01-01 PROCEDURE — 74011250636 HC RX REV CODE- 250/636: Performed by: EMERGENCY MEDICINE

## 2020-01-01 PROCEDURE — 99281 EMR DPT VST MAYX REQ PHY/QHP: CPT

## 2020-01-01 RX ADMIN — ERTAPENEM SODIUM 1 G: 1 INJECTION, POWDER, LYOPHILIZED, FOR SOLUTION INTRAMUSCULAR; INTRAVENOUS at 16:50

## 2020-01-01 NOTE — DISCHARGE INSTRUCTIONS
Patient Education        Learning About the Safe Use of Antibiotics  Introduction    Antibiotics are drugs used to kill bacteria. Bacteria can cause infections. These include strep throat, ear infections, and pneumonia. These medicines can't cure everything. They don't kill viruses or help with allergies. They don't help illnesses such as the common cold, the flu, or a runny nose. And they can cause side effects. There are many types of antibiotics. Your doctor will decide which one will work best for your infection. Examples include:  · Amoxicillin. · Cephalexin (Keflex). · Ciprofloxacin (Cipro). What are the possible side effects? Side effects can include:  · Nausea. · Diarrhea. · Skin rash. · Yeast infection. · A severe allergic reaction. It may cause itching, swelling, and breathing problems. This is rare. You may have other side effects or reactions not listed here. Check the information that comes with your medicine. Should you take antibiotics just in case? Don't take antibiotics when you don't need them. If you do that, they may not work when you do need them. Each time you take antibiotics, you are more likely to have some bacteria that survive and aren't killed by the medicine. Bacteria that don't die can change and become even harder to kill. These are called antibiotic-resistant bacteria. They can cause longer and more serious infections. To treat them, you may need different, stronger antibiotics that have more side effects and may cost more. So always ask your doctor if antibiotics are the best treatment. Explain that you do not want antibiotics unless you need them. Help protect the community  Using antibiotics when they're not needed leads to the development of antibiotic-resistant bacteria. These tougher bacteria can spread to family members, children, and coworkers.  People in your community will have a risk of getting an infection that is harder to cure and that costs more to treat.  How can you take antibiotics safely? Be safe with medicine. Take your antibiotics as directed. Do not stop taking them just because you feel better. You need to take the full course of medicine. This will help make sure your infection is cured. It will also help prevent the growth of antibiotic-resistant bacteria. Always take the exact amount that the label says to take. If the label says to take the medicine at a certain time, follow those directions. You might feel better after you take an antibiotic for a few days. But it is important to keep taking it for as long as prescribed. That will help you get rid of those bacteria that are a bit stronger and that survive the first few days of treatment. Where can you learn more? Go to http://leonela-isaiah.info/. Enter F695 in the search box to learn more about \"Learning About the Safe Use of Antibiotics. \"  Current as of: June 9, 2019  Content Version: 12.2  © 6593-5935 [employer]. Care instructions adapted under license by [employer] (which disclaims liability or warranty for this information). If you have questions about a medical condition or this instruction, always ask your healthcare professional. [place] [address] any warranty or liability for your use of this information.

## 2020-01-01 NOTE — ED TRIAGE NOTES
Triage Note: \"I Missed my appointment today for my infusion and I need it everyday. \"  Patient reports needing Ertapanem antibiotic through a PICC line in right arm. Patient reports missing appt due to lack of ride, and now they are closed.

## 2020-01-01 NOTE — ED PROVIDER NOTES
The history is provided by the patient. No  was used. Medication Refill   This is a recurrent problem. Pertinent negatives include no chest pain, no abdominal pain, no headaches and no shortness of breath. Past Medical History:   Diagnosis Date    Ill-defined condition     \"hole in intestines, liver infection\"    Knee pain        Past Surgical History:   Procedure Laterality Date    HX GI      hernia repair    HX HEENT      tonsils removed    HX ORTHOPAEDIC      right knee surgery         History reviewed. No pertinent family history.     Social History     Socioeconomic History    Marital status: SINGLE     Spouse name: Not on file    Number of children: Not on file    Years of education: Not on file    Highest education level: Not on file   Occupational History    Not on file   Social Needs    Financial resource strain: Not on file    Food insecurity:     Worry: Not on file     Inability: Not on file    Transportation needs:     Medical: Not on file     Non-medical: Not on file   Tobacco Use    Smoking status: Current Every Day Smoker     Packs/day: 0.50    Smokeless tobacco: Never Used   Substance and Sexual Activity    Alcohol use: Yes     Comment: occ    Drug use: No    Sexual activity: Not on file   Lifestyle    Physical activity:     Days per week: Not on file     Minutes per session: Not on file    Stress: Not on file   Relationships    Social connections:     Talks on phone: Not on file     Gets together: Not on file     Attends Scientologist service: Not on file     Active member of club or organization: Not on file     Attends meetings of clubs or organizations: Not on file     Relationship status: Not on file    Intimate partner violence:     Fear of current or ex partner: Not on file     Emotionally abused: Not on file     Physically abused: Not on file     Forced sexual activity: Not on file   Other Topics Concern    Not on file   Social History Narrative  Not on file         ALLERGIES: Patient has no known allergies. Review of Systems   Constitutional: Negative for activity change, chills and fever. HENT: Negative for nosebleeds, sore throat, trouble swallowing and voice change. Eyes: Negative for visual disturbance. Respiratory: Negative for shortness of breath. Cardiovascular: Negative for chest pain and palpitations. Gastrointestinal: Negative for abdominal pain, constipation, diarrhea and nausea. Genitourinary: Negative for difficulty urinating, dysuria, hematuria and urgency. Musculoskeletal: Negative for back pain, neck pain and neck stiffness. Skin: Negative for color change. Allergic/Immunologic: Negative for immunocompromised state. Neurological: Negative for dizziness, seizures, syncope, weakness, light-headedness, numbness and headaches. Psychiatric/Behavioral: Negative for behavioral problems, confusion, hallucinations, self-injury and suicidal ideas. Vitals:    01/01/20 1458   BP: 160/78   Pulse: 98   Resp: 18   Temp: 98.2 °F (36.8 °C)   SpO2: 96%   Weight: 109 kg (240 lb 4.8 oz)   Height: 6' (1.829 m)            Physical Exam  Vitals signs and nursing note reviewed. Constitutional:       General: He is not in acute distress. Appearance: He is well-developed. He is not diaphoretic. HENT:      Head: Atraumatic. Neck:      Trachea: No tracheal deviation. Cardiovascular:      Comments: Warm and well perfused  Pulmonary:      Effort: Pulmonary effort is normal. No respiratory distress. Musculoskeletal: Normal range of motion. Skin:     General: Skin is warm and dry. Neurological:      Mental Status: He is alert. Coordination: Coordination normal.   Psychiatric:         Behavior: Behavior normal.         Thought Content:  Thought content normal.         Judgment: Judgment normal.          MDM     This is a 20-year-old male with past medical history, review of systems, physical exam as above, presenting with request of antibiotic infusion. Patient was admitted here recently, for sepsis, discovered to have liver abscesses, and is undergoing treatment with daily ertapenem infusion, via PICC line. He denies complaints, states he missed his appointment this morning due to transportation issues. Physical exam remarkable for chronically ill-appearing elderly male, in no acute distress, noted to be afebrile, without tachycardia or hypotension. Will consult for pharmacy to create a ertapenem infusion, likely discharge home to follow with his regular physicians.     Procedures

## 2020-01-03 ENCOUNTER — HOSPITAL ENCOUNTER (OUTPATIENT)
Dept: INFUSION THERAPY | Age: 60
Discharge: HOME | End: 2020-01-03
Payer: MEDICAID

## 2020-01-03 VITALS
RESPIRATION RATE: 18 BRPM | SYSTOLIC BLOOD PRESSURE: 116 MMHG | TEMPERATURE: 99 F | DIASTOLIC BLOOD PRESSURE: 78 MMHG | HEART RATE: 93 BPM

## 2020-01-03 PROCEDURE — 74011250636 HC RX REV CODE- 250/636: Performed by: INTERNAL MEDICINE

## 2020-01-03 PROCEDURE — 74011000258 HC RX REV CODE- 258: Performed by: INTERNAL MEDICINE

## 2020-01-03 PROCEDURE — 96365 THER/PROPH/DIAG IV INF INIT: CPT

## 2020-01-03 RX ORDER — SODIUM CHLORIDE 9 MG/ML
10 INJECTION INTRAMUSCULAR; INTRAVENOUS; SUBCUTANEOUS AS NEEDED
Status: DISCONTINUED | OUTPATIENT
Start: 2020-01-03 | End: 2020-01-04 | Stop reason: HOSPADM

## 2020-01-03 RX ORDER — HEPARIN 100 UNIT/ML
500 SYRINGE INTRAVENOUS AS NEEDED
Status: DISCONTINUED | OUTPATIENT
Start: 2020-01-03 | End: 2020-01-04 | Stop reason: HOSPADM

## 2020-01-03 RX ADMIN — ERTAPENEM SODIUM 1 G: 1 INJECTION, POWDER, LYOPHILIZED, FOR SOLUTION INTRAMUSCULAR; INTRAVENOUS at 16:03

## 2020-01-03 NOTE — PROGRESS NOTES
[facility] Short Visit Progress Note    7434 Pt admit to [facility] for daily antibotics ambulatory in stable condition. Assessment completed. No new concerns voiced. Patient Vitals for the past 12 hrs:   Temp Pulse Resp BP   01/03/20 1552 99 °F (37.2 °C) 93 18 116/78       PICC with positive blood return flushed, heparinized and capped per protocol. Medications:  Medications Administered     ertapenem (INVANZ) 1 g in 0.9% sodium chloride (MBP/ADV) 50 mL     Admin Date  01/03/2020 Action  New Bag Dose  1 g Rate  100 mL/hr Route  IntraVENous Administered By  Simone Ruiz, RN                      1630 Pt tolerated treatment well. D/c home ambulatory in no distress.  Pt aware of next appointment scheduled for 1/4/20

## 2020-01-04 ENCOUNTER — HOSPITAL ENCOUNTER (OUTPATIENT)
Dept: INFUSION THERAPY | Age: 60
Discharge: HOME | End: 2020-01-04
Payer: MEDICAID

## 2020-01-04 VITALS
TEMPERATURE: 98 F | HEART RATE: 95 BPM | DIASTOLIC BLOOD PRESSURE: 81 MMHG | RESPIRATION RATE: 18 BRPM | SYSTOLIC BLOOD PRESSURE: 128 MMHG

## 2020-01-04 PROCEDURE — 96365 THER/PROPH/DIAG IV INF INIT: CPT

## 2020-01-04 PROCEDURE — 74011000258 HC RX REV CODE- 258: Performed by: INTERNAL MEDICINE

## 2020-01-04 PROCEDURE — 74011250636 HC RX REV CODE- 250/636: Performed by: INTERNAL MEDICINE

## 2020-01-04 RX ORDER — SODIUM CHLORIDE 0.9 % (FLUSH) 0.9 %
5-10 SYRINGE (ML) INJECTION AS NEEDED
Status: DISCONTINUED | OUTPATIENT
Start: 2020-01-04 | End: 2020-01-05 | Stop reason: HOSPADM

## 2020-01-04 RX ORDER — HEPARIN 100 UNIT/ML
500 SYRINGE INTRAVENOUS AS NEEDED
Status: DISCONTINUED | OUTPATIENT
Start: 2020-01-04 | End: 2020-01-05 | Stop reason: HOSPADM

## 2020-01-04 RX ADMIN — ERTAPENEM SODIUM 1 G: 1 INJECTION, POWDER, LYOPHILIZED, FOR SOLUTION INTRAMUSCULAR; INTRAVENOUS at 11:55

## 2020-01-04 RX ADMIN — Medication 10 ML: at 12:25

## 2020-01-04 RX ADMIN — Medication 500 UNITS: at 12:26

## 2020-01-04 RX ADMIN — Medication 10 ML: at 11:52

## 2020-01-04 NOTE — PROGRESS NOTES
[facility] Progress Note    Date: 2020    Name: Elissa Anton III    MRN: [id number]         : 1960    1150. Mr. Chintan Martino Arrived ambulatory and in no distress for Daily Antibitoics. Assessment was completed, reports 5/10 pain to LLE- chronic. RUE PICC with + blood return. Dressing changed per protocol by EMA Elizabeth RN. Patient Vitals for the past 12 hrs:   Temp Pulse Resp BP   20 1153 98 °F (36.7 °C) 95 18 128/81       Medications:  Invanz IV    Hung & administered by EMA Ochoa RN    4909. Mr. Chintan Martino tolerated treatment well and was discharged from [place] [address] in stable condition. He is to return on 2020 for his next appointment.     Shanice Meza RN  2020

## 2020-01-05 ENCOUNTER — HOSPITAL ENCOUNTER (OUTPATIENT)
Dept: INFUSION THERAPY | Age: 60
Discharge: HOME | End: 2020-01-05
Payer: MEDICAID

## 2020-01-06 ENCOUNTER — HOSPITAL ENCOUNTER (OUTPATIENT)
Dept: INFUSION THERAPY | Age: 60
Discharge: HOME | End: 2020-01-06
Payer: MEDICAID

## 2020-01-06 VITALS
TEMPERATURE: 98.5 F | HEART RATE: 102 BPM | DIASTOLIC BLOOD PRESSURE: 94 MMHG | RESPIRATION RATE: 18 BRPM | SYSTOLIC BLOOD PRESSURE: 159 MMHG

## 2020-01-06 PROCEDURE — 74011000258 HC RX REV CODE- 258: Performed by: INTERNAL MEDICINE

## 2020-01-06 PROCEDURE — 74011250636 HC RX REV CODE- 250/636: Performed by: INTERNAL MEDICINE

## 2020-01-06 PROCEDURE — 96365 THER/PROPH/DIAG IV INF INIT: CPT

## 2020-01-06 RX ORDER — HEPARIN SODIUM (PORCINE) LOCK FLUSH IV SOLN 100 UNIT/ML 100 UNIT/ML
500 SOLUTION INTRAVENOUS AS NEEDED
Status: DISCONTINUED | OUTPATIENT
Start: 2020-01-06 | End: 2020-01-06

## 2020-01-06 RX ORDER — SODIUM CHLORIDE 0.9 % (FLUSH) 0.9 %
10-40 SYRINGE (ML) INJECTION AS NEEDED
Status: DISCONTINUED | OUTPATIENT
Start: 2020-01-06 | End: 2020-01-07 | Stop reason: HOSPADM

## 2020-01-06 RX ORDER — HEPARIN 100 UNIT/ML
500 SYRINGE INTRAVENOUS AS NEEDED
Status: DISCONTINUED | OUTPATIENT
Start: 2020-01-06 | End: 2020-01-07 | Stop reason: HOSPADM

## 2020-01-06 RX ADMIN — ERTAPENEM SODIUM 1 G: 1 INJECTION, POWDER, LYOPHILIZED, FOR SOLUTION INTRAMUSCULAR; INTRAVENOUS at 19:06

## 2020-01-06 RX ADMIN — Medication 10 ML: at 19:06

## 2020-01-06 RX ADMIN — Medication 500 UNITS: at 19:36

## 2020-01-06 RX ADMIN — Medication 20 ML: at 19:36

## 2020-01-07 ENCOUNTER — HOSPITAL ENCOUNTER (OUTPATIENT)
Dept: INFUSION THERAPY | Age: 60
Discharge: HOME | End: 2020-01-07
Payer: MEDICAID

## 2020-01-07 ENCOUNTER — OFFICE VISIT (OUTPATIENT)
Dept: FAMILY MEDICINE CLINIC | Age: 60
End: 2020-01-07

## 2020-01-07 VITALS
OXYGEN SATURATION: 96 % | TEMPERATURE: 97.4 F | SYSTOLIC BLOOD PRESSURE: 138 MMHG | HEART RATE: 89 BPM | DIASTOLIC BLOOD PRESSURE: 80 MMHG | RESPIRATION RATE: 18 BRPM | WEIGHT: 238.2 LBS | HEIGHT: 72 IN | BODY MASS INDEX: 32.26 KG/M2

## 2020-01-07 VITALS — DIASTOLIC BLOOD PRESSURE: 76 MMHG | SYSTOLIC BLOOD PRESSURE: 132 MMHG

## 2020-01-07 DIAGNOSIS — R78.81 BACTEREMIA: ICD-10-CM

## 2020-01-07 DIAGNOSIS — K75.0 LIVER ABSCESS: Primary | ICD-10-CM

## 2020-01-07 DIAGNOSIS — K57.92 DIVERTICULITIS: ICD-10-CM

## 2020-01-07 DIAGNOSIS — I26.99 PULMONARY EMBOLISM, OTHER, UNSPECIFIED CHRONICITY, UNSPECIFIED WHETHER ACUTE COR PULMONALE PRESENT (HCC): ICD-10-CM

## 2020-01-07 PROCEDURE — 74011000258 HC RX REV CODE- 258: Performed by: INTERNAL MEDICINE

## 2020-01-07 PROCEDURE — 96365 THER/PROPH/DIAG IV INF INIT: CPT

## 2020-01-07 PROCEDURE — 74011250636 HC RX REV CODE- 250/636: Performed by: INTERNAL MEDICINE

## 2020-01-07 RX ORDER — AMOXICILLIN AND CLAVULANATE POTASSIUM 875; 125 MG/1; MG/1
1 TABLET, FILM COATED ORAL 2 TIMES DAILY
Qty: 20 TAB | Refills: 0 | Status: SHIPPED | OUTPATIENT
Start: 2020-01-07 | End: 2020-01-07 | Stop reason: SDUPTHER

## 2020-01-07 RX ORDER — AMOXICILLIN AND CLAVULANATE POTASSIUM 875; 125 MG/1; MG/1
1 TABLET, FILM COATED ORAL 2 TIMES DAILY
Qty: 20 TAB | Refills: 0 | Status: SHIPPED | OUTPATIENT
Start: 2020-01-07 | End: 2020-01-17

## 2020-01-07 RX ADMIN — ERTAPENEM SODIUM 1 G: 1 INJECTION, POWDER, LYOPHILIZED, FOR SOLUTION INTRAMUSCULAR; INTRAVENOUS at 17:13

## 2020-01-07 NOTE — PROGRESS NOTES
[facility] Progress Note    Date: 2020    Name: Lena Solis III    MRN: [id number]         : 1960    0700. Mr. Jeet Gross Arrived ambulatory and in no distress for Daily Antibitoics. Assessment was completed, no new concerns voiced. RUE PICC with + blood return. Patient Vitals for the past 12 hrs:   Temp Pulse Resp BP   20 1902 98.5 °F (36.9 °C) (!) 102 18 (!) 159/94     Medications Administered     ertapenem (INVANZ) 1 g in 0.9% sodium chloride (MBP/ADV) 50 mL     Admin Date  2020 Action  New Bag Dose  1 g Rate  100 mL/hr Route  IntraVENous Administered By  Floridalma Trujillo RN          heparin (porcine) pf 500 Units     Admin Date  2020 Action  Given Dose  500 Units Route  IntraVENous Administered By  Emilia Schwab, RN          sodium chloride (NS) flush 10-40 mL     Admin Date  2020 Action  Given Dose  10 mL Route  IntraVENous Administered By  Floridalma Trujillo RN           Admin Date  2020 Action  Given Dose  20 mL Route  IntraVENous Administered By  Floridalma Trujillo RN              2388. Mr. Jeet Gross tolerated treatment well and was discharged from [place] [address] in stable condition. He is to return on 2020.      Jaime Reardon RN  2020

## 2020-01-07 NOTE — PROGRESS NOTES
Chief Complaint   Patient presents with    Follow-up     Liver abscess     1. Have you been to the ER, urgent care clinic since your last visit? [facility]  Hospitalized since your last visit? No     2. Have you seen or consulted any other health care providers outside of the [address] [place] [address] since your last visit?   No

## 2020-01-07 NOTE — PROGRESS NOTES
[facility] Note:  Pt arrived at [facility] ambulatory and in no distress for last antibiotic and picc removal. Gave pt dc sheet and reviewed restrictions. .    Assessment stable, no new complaints voiced. Medications received:  Invanz  7452 Tolerated treatment well, no adverse reaction noted. D/Cd from [facility] ambulatory and in no distress accompanied by self.   Next appt none

## 2020-01-08 NOTE — PROGRESS NOTES
Infectious Disease Clinic Note      HPI:       Mr Esa Moran seen in follow up of liver abscess  Denied fever, chills, nausea, vomiting, abd pain, diarrhea   Gets antibiotics at infusion center at [facility]  Denied issues wt picc line  Denied yeast infection symptoms   Dx wt PE       Current Outpatient Medications:     amoxicillin-clavulanate (AUGMENTIN) 875-125 mg per tablet, Take 1 Tab by mouth two (2) times a day for 10 days. , Disp: 20 Tab, Rfl: 0    apixaban (ELIQUIS) 5 mg (74 tabs) starter pack, Take 10 mg (two 5 mg tablets) by mouth twice a day for 7 days  Followed by 5 mg (one 5 mg tablet) by mouth twice a day, Disp: 1 Dose Pack, Rfl: 0    multivitamin (ONE A DAY) tablet, Take 1 Tab by mouth daily. , Disp: , Rfl:     Lactobac 41/B. bifid,lactis/FOS (ULTIMATE PROBIOTIC-10 PO), Take 1 Cap by mouth., Disp: , Rfl:     oxyCODONE-acetaminophen (PERCOCET) 5-325 mg per tablet, Take 1 Tab by mouth every six (6) hours as needed for Pain., Disp: , Rfl:   No current facility-administered medications for this visit.            Physical Exam:    Vitals:   Patient Vitals for the past 24 hrs:   Temp Pulse Resp BP SpO2   11/21/19 0858 97.9 °F (36.6 °C) 86 18 114/73 96 %   11/21/19 0506 97.6 °F (36.4 °C) 72 18 111/68 96 %   11/20/19 2327 97.6 °F (36.4 °C) 73 15 118/82 97 %   11/20/19 1927 98 °F (36.7 °C) 86 18 123/75 98 %   11/20/19 1544 97.2 °F (36.2 °C) 87 14 125/85 97 %   11/20/19 1345  92 16 117/85 99 %   11/20/19 1330  (!) 103 16 107/85 99 %   11/20/19 1315  100 20 (!) 89/59    11/20/19 1312  (!) 112 12 103/60 99 %   11/20/19 1310    103/60      · GEN: NAD,  · HEENT:  no scleral icterus,   no thrus  · CV: S1, S2 heard regularly,  · Lungs: Clear to auscultation bilaterally  · Abdomen: soft, non distended, non tender, no rash   · Extremities: no edema  · Skin: no rash        Labs:   Reviewed 12/9/19 wbc 8.6, plt 248, hbg 12.8, cr 0.85, alt 33, ast 17, alk phos 140, bili 0.3      Microbiology Data:     Blood 11/17/19    Component Value Ref Range & Units Status   Special Requests: NO SPECIAL REQUESTS    Preliminary   Culture result: NO GROWTH 4 DAYS    Preliminary   Result History                  Blood: 11/12/19       Component Value Ref Range & Units Status   Special Requests: NO SPECIAL REQUESTS    Final   Culture result: Abnormal     Final   FUSOBACTERIUM SPECIES BETA LACTAMASE NEGATIVE GROWING IN 2 OF 4 BOTTLES DRAWN (SITES = L AC AND R AC)   Culture result: Abnormal     Final   PRELIMINARY REPORT OF ANAEROBIC GRAM NEGATIVE RODS GROWING IN 2 OF 4 BOTTLES DRAWN CALLED TO AND READ BACK BY Jaquan Crystal RN ON 11/17/19 AT 0647 TA. Culture result:    Final   REMAINING BOTTLE(S) HAS/HAVE NO GROWTH IN 5 DAYS    Result History               Abscess 11/13/19       Component Value Ref Range & Units Status   Special Requests: LIVER    Final   GRAM STAIN 3+ WBCS SEEN    Final   GRAM STAIN NO ORGANISMS SEEN    Final   Culture result:   Final   NO GROWTH IN 2 DAYS, AEROBICALLY    Culture result: Abnormal     Final   MODERATE ANAEROBIC GRAM NEGATIVE RODS ISOLATED . ... PLEASE REFER TO Viral Fernando [id number], FOR IDENTIFICATION    Result History     Specimen Information: Abscess        Component Value Ref Range & Units Status   Special Requests: LIVER   Final   Culture result: Abnormal     Final   MODERATE FUSOBACTERIUM SPECIES BETA LACTAMASE NEGATIVE    Result History         Component Value Ref Range & Units Status   Special Requests: LIVER    Final   GRAM STAIN 2+ WBCS SEEN    Final   GRAM STAIN NO ORGANISMS SEEN    Final   Culture result:   Final   NO GROWTH 2 DAYS, AEROBICALLY    Culture result: Abnormal     Final   MODERATE ANAEROBIC GRAM NEGATIVE RODS ISOLATED . .. PLEASE REFER TO Viral Fernando [id number] FOR IDENTIFICATION    Result History         Pathology Results:       No definite organisms identified on routine stains. Recommend correlation with pending microbiology testing. Amoebic infections may also result in liver abscess.  Depending on clinical information, consider serologic testing Addendum Electronically Signed Out By Eve Cid MD   [facility]/11/15/2019         Specimen Source   1: Liver, Core biopsy with touch intepretation:   CYTOLOGIC INTERPRETATION:   1. Liver, Core biopsy with touch intepretation:   Liver parenchyma with acute and chronic inflammation and macrovesicular steatosis   See comment   General Categorization   No cells diagnostic for malignancy   Specimen Adequacy   Satisfactory for evaluation     Imaging:     CT 12/26/19  FINDINGS:      THYROID: No nodule. MEDIASTINUM: No mass or lymphadenopathy. YOEL: No mass or lymphadenopathy. THORACIC AORTA: No dissection or aneurysm. MAIN PULMONARY ARTERY: There is evidence of pulmonary embolism in the left upper  and left lower lobe branches. TRACHEA/BRONCHI: Patent. ESOPHAGUS: No wall thickening or dilatation. HEART: Normal in size. PLEURA: No effusion or pneumothorax. LUNGS: No nodule, mass, or airspace disease. LIVER: Hypodensity in the left hepatic lobe has decreased in size, now measuring  approximately 3.8 x 3.5 cm, previously measuring 6.9 x 6.2 cm. 2.7 cm area of  enhancement in the anterior segment of the right hepatic lobe has not changed  substantially compared to the prior exam.  GALLBLADDER: Unremarkable. SPLEEN: No mass. PANCREAS: No mass or ductal dilatation. ADRENALS: Unremarkable. KIDNEYS: No mass, calculus, or hydronephrosis. STOMACH: Unremarkable. SMALL BOWEL: No dilatation or wall thickening. COLON: Wall thickening sigmoid colon is noted with associated inflammation  compatible with acute diverticulitis. APPENDIX: Unremarkable. PERITONEUM: No ascites or pneumoperitoneum. RETROPERITONEUM: No lymphadenopathy or aortic aneurysm. REPRODUCTIVE ORGANS: There is no pelvic mass or lymphadenopathy. URINARY BLADDER: No mass or calculus. BONES: Degenerative changes are seen in the thoracolumbar spine and sacroiliac  joints bilaterally.   ADDITIONAL COMMENTS: N/A     IMPRESSION  IMPRESSION:  Interval decrease in the lesion in the left hepatic lobe, clinically indicated  previously as abscess. Enhancing abnormality right hepatic lobe is stable and  may represent a hemangioma. Stable wall thickening sigmoid colon with associated  inflammation compatible with diverticulitis. Pulmonary embolism left upper and  left lower lobe.      Attempts to contact the referring physician were unsuccessful as the office  appears to be closed. The patient was taken to the emergency department. US ABD 11/12/19  COMPARISON:  CT scan earlier today     FINDINGS: Limited right upper quadrant ultrasound was performed. The liver is  diffusely increased in echogenicity and enlarged. Within the left lobe of the  liver, there is an ill-defined, mixed echogenicity mass, measuring 5.6 x 5.5 x  3.6 cm. . The common bile duct is normal measuring 3 mm in diameter. The  gallbladder is normal. The right kidney measures 11.8 cm in length.     IMPRESSION  IMPRESSION:      1. Enlarged, echogenic liver, compatible with diffuse fatty infiltration. 2. Complex mass in the left lobe of the liver measuring 5.6 cm.  3. No biliary ductal dilatation.     Further evaluation with liver mass protocol MRI or CT is recommended. CT ABD 11/13/19  FINDINGS:      LIVER: There is a 6.5 cm multiloculated low density cystic lesion in segment 2/3  of the left hepatic lobe concerning for abscess versus cystic neoplasm. Small  nonspecific low-density focus in the right hepatic lobe measuring 3.4 x 1.1 cm  (series 6, image 40). No other focal liver abnormality. No biliary ductal  dilatation   GALLBLADDER: Probable small stones but otherwise unremarkable in appearance. SPLEEN: Borderline splenomegaly  PANCREAS: No mass or ductal dilatation. ADRENALS: Unremarkable. KIDNEYS/URETERS: Symmetric nephrograms without evidence for focal mass. No  hydronephrosis.    PERITONEUM: Borderline enlarged para-aortic retroperitoneal lymph nodes. No  other evidence for abdominal lymphadenopathy. No ascites. COLON: Inflammatory changes in the sigmoid colon are stable, likely related to  diverticulitis. Stable probable contained microperforation along the posterior  wall of the sigmoid colon (image 147). No free intraperitoneal gas. No evidence  for pericolonic abscess. APPENDIX: Unremarkable. SMALL BOWEL: No dilatation or wall thickening. STOMACH: Unremarkable. PELVIS: No pelvic lymphadenopathy. Trace pericolonic free fluid. The bladder is  unremarkable. BONES: No destructive bone lesion. Mild to moderate lumbosacral spondylosis. VISUALIZED THORAX: Bibasilar subsegmental atelectasis  ADDITIONAL COMMENTS: N/A     IMPRESSION  IMPRESSION:     6 cm multiloculated low density/cystic lesion in the left hepatic lobe may  represent abscess in the appropriate clinical picture. Cystic neoplasm is  another possibility. There is a smaller 3 cm nonspecific low-density focus in  the right hepatic lobe.     Stable inflammatory changes involving the sigmoid colon. CT 11/12/19  COMPARISON: CT chest 11/12/2019     CONTRAST:  None.     TECHNIQUE:   Thin axial images were obtained through the abdomen and pelvis. Coronal and  sagittal reconstructions were generated. Oral contrast was not administered. CT  dose reduction was achieved through use of a standardized protocol tailored for  this examination and automatic exposure control for dose modulation.      The absence of intravenous contrast material reduces the sensitivity for  evaluation of the solid parenchymal organs of the abdomen.      FINDINGS:   LUNG BASES: There is mild bibasilar atelectasis. INCIDENTALLY IMAGED HEART AND MEDIASTINUM: Unremarkable. LIVER: Diffuse fatty infiltration of the liver. GALLBLADDER: Unremarkable. SPLEEN: No mass. PANCREAS: No mass or ductal dilatation. ADRENALS: Unremarkable. KIDNEYS/URETERS: No mass, calculus, or hydronephrosis.   STOMACH: Unremarkable. SMALL BOWEL: No bowel obstruction or perforation  COLON: Extensive diverticulosis of the colon. There is bowel wall thickening of  the sigmoid colon with extensive diverticula as well as pericolonic  inflammation, but no focal fluid collections. There is also a small amount of  gas adjacent to the affected loop of sigmoid colon, which may be extraluminal.  There is no free intraperitoneal air. APPENDIX: Unremarkable. PERITONEUM: No ascites  RETROPERITONEUM: No lymphadenopathy or aortic aneurysm. REPRODUCTIVE ORGANS: The prostate is noted. URINARY BLADDER: No mass or calculus. BONES: No destructive bone lesion. ADDITIONAL COMMENTS: There is degenerative disc disease which is most advanced  at L4-L5.     IMPRESSION  IMPRESSION:     1. Concentric bowel wall thickening of a 7 cm segment of sigmoid colon, where  there are extensive diverticula and mild pericolonic inflammation. This is  consistent with colitis, which may be due to diverticulitis. Notably, there is a  small amount of extraluminal gas suspected, immediately adjacent to the affected  segment of sigmoid colon. This may represent a localized perforation. 2. No evidence of abscess or bowel obstruction. 3. Diffuse fatty infiltration of the liver. FINDINGS:   The visualized thyroid gland is unremarkable. The aorta and main pulmonary  artery are normal in caliber. There is coronary artery atherosclerosis. The  heart size is normal.  There is no pericardial effusion.       There are no enlarged axillary, mediastinal, or hilar lymph nodes.      There are minimal subpleural reticular opacities in the lower lungs. There is  minimal left basilar scarring or atelectasis. There is no suspicious lung  nodule, mass, or consolidation.     In the limited images of the upper abdomen, the partially imaged solid organs  are unremarkable. Degenerative changes in the spine.  There is no aggressive bony  lesion.     IMPRESSION  IMPRESSION:   Minimal interstitial changes in the lower lungs with minimal left basilar  scarring or atelectasis. No evidence for atypical infection or other acute  abnormality. TTE  Left Ventricle Normal cavity size, wall thickness, systolic function (ejection fraction normal) and diastolic function. The muscle mass is normal. The cavity shape is normal. The estimated ejection fraction is 56 - 60%. No regional wall motion abnormality noted. End-systolic volume is normal. Normal left ventricular strain. Normal left ventricular diastolic pressure. End-diastolic volume is normal.   Wall Scoring The left ventricular wall motion is normal.            Left Atrium Normal cavity size. No atrial septal defect present. Right Ventricle Normal cavity size, wall thickness and global systolic function. Right Atrium Normal cavity size. Interatrial Septum No atrial septal defect present. No interatrial septal aneurysm present. .   Aortic Valve Normal valve structure, trileaflet valve structure, no stenosis and no regurgitation. Mitral Valve Normal valve structure, no stenosis and no regurgitation. Tricuspid Valve Normal valve structure, no stenosis and no regurgitation. Pulmonic Valve Pulmonic valve not well visualized. Normal valve structure, no stenosis and no regurgitation. Aorta Normal aortic root, ascending aortic, and aortic arch. IVC/Hepatic Veins Mildly elevated central venous pressure (5-10 mmHg); IVC diameter is less than 21 mm and collapses less than 50% with respiration. CT ABD 11/18/19  FINDINGS:   LUNG BASES: Small bilateral pleural effusions are present increase in interval.  Bibasilar volume loss is present. INCIDENTALLY IMAGED HEART AND MEDIASTINUM: Unremarkable. LIVER: The left hepatic lobe segment 2, again noted is a lobulated hypodense  lesion which demonstrates possible mild peripheral enhancement.  Overall lesion  size is not significantly changed although there may be some increased edema  around the lesion. GALLBLADDER: Unremarkable. SPLEEN: No mass. PANCREAS: No mass or ductal dilatation. ADRENALS: Unremarkable. KIDNEYS: No mass, calculus, or hydronephrosis. STOMACH: Unremarkable. SMALL BOWEL: No dilatation or wall thickening. COLON: Colonic wall thickening and mild pericolonic inflammation is noted in the  sigmoid colon compatible with diverticulitis. No evidence of perforation or  abscess formation. APPENDIX: Unremarkable. PERITONEUM: No significant free fluid is identified. No lymphadenopathy in the  peritoneum. RETROPERITONEUM: Mildly prominent retroperitoneal lymph nodes measuring up to 11  mm in size not significantly changed. REPRODUCTIVE ORGANS: Unremarkable  URINARY BLADDER: Nondistended and mildly thickened  BONES: No destructive bone lesion. ADDITIONAL COMMENTS: N/A     IMPRESSION  IMPRESSION:  1. Lobulated hypodense liver lesion without significant change in size. Possible increased surrounding edema. . This is not entirely specific however  previous biopsy demonstrated probable infection.     2. No evidence of diverticular abscess or perforation. Persistent colonic wall  thickening and mild pericolonic inflammation of the sigmoid colon. No  significant free fluid.     3.  Other incidental findings as described      Carotid US  Right Carotid     There is no stenosis in the right CCA. There is mild stenosis in the right ICA (<50%). The right ICA has mixed density plaque. There is no stenosis in the right ECA. The right vertebral is antegrade. Left Carotid     There is no stenosis in the left CCA. There is mild stenosis in the left ICA (<50%). The left ICA has mixed density plaque. There is no stenosis in the left ECA. The left vertebral is antegrade. LATANYA 11/20/19  Findings: no conclusive evidence for vegetations on valves  Probable right atrial mass attached to lateral wall the nature of which is uncertain    Cardiac MRI 11/22/19    1.  Severe concentric left ventricular hypertrophy. Mild left ventricular  systolic dysfunction. Dyskinesis of the mid to distal anterior wall. LVEF 45%. 2. Normal right ventricular size and systolic function. RVEF 60%. 3. Bicuspid aortic valve without aortic valve stenosis. 4. Careful and focused multioblique imaging of the right atrium was performed  using T1, T2 W, FIESTA and postcontrast imaging. No pathological right atrial  masses were visualized. 5. Normal pleura and pericardium. There is no significant effusions. 6. Incidental finding of a hepatic lesion measuring 30 x 19 mm which is very  bright on triple IR T2 W images. This is likely a small hepatic hemangioma  however dedicated hepatic imaging is recommended for further evaluation. 7. The transesophageal echocardiogram from November 20, 2019 was reviewed in  relationship to this MRI. The questionable right atrial mass seen on LATANYA is  likely a shadow artifact as numerous saline bubbles were seen crossing this  area.         Procedures:     16/79/93  Uncomplicated CT-guided left lobe liver mass biopsy and aspiration      Assessment / Plan:      Mr Jose vAila is a 58-year-old gentleman reports no significant past medical history admitted on 11/12/2019 with abdominal pain and diarrhea. 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  Cardiac MRI done and no masses seen    Noted Carotid US   On Ertapenem IV S/P completed treatment    Denies adverse effects, seizures wt antibiotics   Risk for CDI discussed   Probiotics continued       2) Liver abscess   Status post drainage on 11/13/2019  Cultures with GNR anaerobic , fusobacterium   Ertapenem  IV by picc line   Repeat imaging of abdomen showed \" Hypodensity in the left hepatic lobe has decreased in size, now measuring approximately 3.8 x 3.5 cm, previously measuring 6.9 x 6.2 cm. 2.7 cm area of enhancement in the anterior segment of the right hepatic lobe has not changed  substantially compared to the prior exam.\"  S/P treatment with Ertapenem for > 4-6 weeks  Given response, will stop IV Ertapenem   Po Augmentin for 10 more days   Needs repeat imagine in a few weeks again   DC picc line     3) diverticulitis with microperforation  Evaluated by surgery last admission     4) PE: on eliquis  F/u with pcp     5) RTC 3 -4 weeks    Will repeat ct on follow up

## 2020-02-05 ENCOUNTER — TELEPHONE (OUTPATIENT)
Dept: FAMILY MEDICINE CLINIC | Age: 60
End: 2020-02-05

## 2020-02-05 NOTE — TELEPHONE ENCOUNTER
----- Message from Louchristopher Alstonde sent at 2/5/2020 11:37 AM EST -----  Regarding: MD Alvarado/ telephone  Contact: [phone]  Caller's first and last name: pt  Reason for call: CT Scan  Callback required yes/no and why: yes  Best contact number(s): [phone]  Details to clarify the request: Pt is waiting appointment to have CT scan done that was advised by PCP. Pt states PCP was supposed to set up appointment at [address] before upcoming appointment on 2/11/2020.

## 2020-02-05 NOTE — TELEPHONE ENCOUNTER
Spoke with patient. He said that Dr. Susana Evans was supposed to set up CT scan and he hasn't heard anything.  He is scheduled to see Dr. Susana Evans on 2-11-20

## 2020-02-07 ENCOUNTER — TELEPHONE (OUTPATIENT)
Dept: FAMILY MEDICINE CLINIC | Age: 60
End: 2020-02-07

## 2020-02-07 NOTE — TELEPHONE ENCOUNTER
DO Liana Mahmood, LPN   Caller: Unspecified (2 days ago, 11:41 AM)             Will see in follow up to address      Patient notified

## 2020-02-07 NOTE — TELEPHONE ENCOUNTER
----- Message from Bird Vera sent at 2/7/2020  3:03 PM EST -----  Regarding: MD Alvarado/ telephone  Contact: [phone]  Caller's first and last name: pt  Reason for call: CT  Callback required yes/no and why: yes  Best contact number(s):[phone]  Details to clarify the request:  Pt was advised that the practice would setup an appointment for a CT scan before his upcoming appointment on 2/11/2020. Pt has not had the CT scan done.

## 2020-02-11 ENCOUNTER — TELEPHONE (OUTPATIENT)
Dept: FAMILY MEDICINE CLINIC | Age: 60
End: 2020-02-11

## 2020-02-11 NOTE — TELEPHONE ENCOUNTER
----- Message from [employer] sent at 2/11/2020  9:42 AM EST -----  Regarding: Dr. Lucio Gave: [phone]  Caller's first and last name and relationship to patient (if not the patient): n/a  Best contact number:  [phone]  Preferred date and time: Any time, any day  Scheduled appointment date and time: Tuesday, February 11, 2020 10:30 AM (Canceled)  Reason for appointment:  Follow Up appt  Details to clarify the request: Pt would also like to discuss when his next CT scan should be.    Additional details:  n/a

## 2020-02-18 ENCOUNTER — OFFICE VISIT (OUTPATIENT)
Dept: FAMILY MEDICINE CLINIC | Age: 60
End: 2020-02-18

## 2020-02-18 VITALS
WEIGHT: 240 LBS | DIASTOLIC BLOOD PRESSURE: 80 MMHG | RESPIRATION RATE: 19 BRPM | BODY MASS INDEX: 32.51 KG/M2 | HEIGHT: 72 IN | SYSTOLIC BLOOD PRESSURE: 124 MMHG | HEART RATE: 81 BPM | TEMPERATURE: 97.8 F | OXYGEN SATURATION: 97 %

## 2020-02-18 DIAGNOSIS — K75.0 LIVER ABSCESS: Primary | ICD-10-CM

## 2020-02-18 NOTE — PROGRESS NOTES
Chief Complaint   Patient presents with    Follow-up     liver abscess     1. Have you been to the ER, urgent care clinic since your last visit? No   Hospitalized since your last visit? No     2. Have you seen or consulted any other health care providers outside of the [address] [place] [address] since your last visit?  No

## 2020-02-18 NOTE — PROGRESS NOTES
Infectious Disease Clinic Note      HPI:       Mr Aristides Lorenzo seen in follow up of liver abscess  He has completed IV Ertapenem and then oral Augmentin   He is now off antibiotics   Denied fever, chills, nausea, vomiting, abd pain, diarrhea   Says his blood thinners were stopped due to melena by PCP  Denied chest pain, pleuritic pain, or shortness of breath , lightheaded       Current Outpatient Medications:     apixaban (ELIQUIS) 5 mg (74 tabs) starter pack, Take 10 mg (two 5 mg tablets) by mouth twice a day for 7 days  Followed by 5 mg (one 5 mg tablet) by mouth twice a day, Disp: 1 Dose Pack, Rfl: 0    multivitamin (ONE A DAY) tablet, Take 1 Tab by mouth daily. , Disp: , Rfl:     Lactobac 41/B. bifid,lactis/FOS (ULTIMATE PROBIOTIC-10 PO), Take 1 Cap by mouth., Disp: , Rfl:     oxyCODONE-acetaminophen (PERCOCET) 5-325 mg per tablet, Take 1 Tab by mouth every six (6) hours as needed for Pain., Disp: , Rfl:           Physical Exam:    Vitals:  Reviewed   · GEN: NAD,  · HEENT:  no scleral icterus,   no thrush, one chipped tooth front   · CV: S1, S2 heard regularly,  · Lungs: Clear to auscultation bilaterally  · Abdomen: soft, non distended, non tender, no rash ,old small scars noted   · Extremities: no edema  · Skin: no rash        Labs:     reviewed 12/30/19  Cr 0.85, WBC 6.8, Plt 236,HCT 41.6     Reviewed 12/9/19 wbc 8.6, plt 248, hbg 12.8, cr 0.85, alt 33, ast 17, alk phos 140, bili 0.3      Microbiology Data:     Blood 11/17/19    Component Value Ref Range & Units Status   Special Requests: NO SPECIAL REQUESTS    Preliminary   Culture result: NO GROWTH 4 DAYS    Preliminary   Result History                  Blood: 11/12/19       Component Value Ref Range & Units Status   Special Requests: NO SPECIAL REQUESTS    Final   Culture result: Abnormal     Final   FUSOBACTERIUM SPECIES BETA LACTAMASE NEGATIVE GROWING IN 2 OF 4 BOTTLES DRAWN (SITES = L AC AND R AC)   Culture result: Abnormal     Final   PRELIMINARY REPORT OF ANAEROBIC GRAM NEGATIVE RODS GROWING IN 2 OF 4 BOTTLES DRAWN CALLED TO AND READ BACK BY Martha Ty RN ON 11/17/19 AT 2012 TA. Culture result:    Final   REMAINING BOTTLE(S) HAS/HAVE NO GROWTH IN 5 DAYS    Result History               Abscess 11/13/19       Component Value Ref Range & Units Status   Special Requests: LIVER    Final   GRAM STAIN 3+ WBCS SEEN    Final   GRAM STAIN NO ORGANISMS SEEN    Final   Culture result:   Final   NO GROWTH IN 2 DAYS, AEROBICALLY    Culture result: Abnormal     Final   MODERATE ANAEROBIC GRAM NEGATIVE RODS ISOLATED . ... PLEASE REFER TO Sabrina Good [id number], FOR IDENTIFICATION    Result History     Specimen Information: Abscess        Component Value Ref Range & Units Status   Special Requests: LIVER   Final   Culture result: Abnormal     Final   MODERATE FUSOBACTERIUM SPECIES BETA LACTAMASE NEGATIVE    Result History         Component Value Ref Range & Units Status   Special Requests: LIVER    Final   GRAM STAIN 2+ WBCS SEEN    Final   GRAM STAIN NO ORGANISMS SEEN    Final   Culture result:   Final   NO GROWTH 2 DAYS, AEROBICALLY    Culture result: Abnormal     Final   MODERATE ANAEROBIC GRAM NEGATIVE RODS ISOLATED . .. PLEASE REFER TO Sabrina Good [id number] FOR IDENTIFICATION    Result History         Pathology Results:       No definite organisms identified on routine stains. Recommend correlation with pending microbiology testing. Amoebic infections may also result in liver abscess. Depending on clinical information, consider serologic testing Addendum Electronically Signed Out By Glenny Ware MD   [facility]/11/15/2019         Specimen Source   1: Liver, Core biopsy with touch intepretation:   CYTOLOGIC INTERPRETATION:   1.  Liver, Core biopsy with touch intepretation:   Liver parenchyma with acute and chronic inflammation and macrovesicular steatosis   See comment   General Categorization   No cells diagnostic for malignancy   Specimen Adequacy   Satisfactory for evaluation     Imaging: CT 12/26/19  FINDINGS:      THYROID: No nodule. MEDIASTINUM: No mass or lymphadenopathy. YOEL: No mass or lymphadenopathy. THORACIC AORTA: No dissection or aneurysm. MAIN PULMONARY ARTERY: There is evidence of pulmonary embolism in the left upper  and left lower lobe branches. TRACHEA/BRONCHI: Patent. ESOPHAGUS: No wall thickening or dilatation. HEART: Normal in size. PLEURA: No effusion or pneumothorax. LUNGS: No nodule, mass, or airspace disease. LIVER: Hypodensity in the left hepatic lobe has decreased in size, now measuring  approximately 3.8 x 3.5 cm, previously measuring 6.9 x 6.2 cm. 2.7 cm area of  enhancement in the anterior segment of the right hepatic lobe has not changed  substantially compared to the prior exam.  GALLBLADDER: Unremarkable. SPLEEN: No mass. PANCREAS: No mass or ductal dilatation. ADRENALS: Unremarkable. KIDNEYS: No mass, calculus, or hydronephrosis. STOMACH: Unremarkable. SMALL BOWEL: No dilatation or wall thickening. COLON: Wall thickening sigmoid colon is noted with associated inflammation  compatible with acute diverticulitis. APPENDIX: Unremarkable. PERITONEUM: No ascites or pneumoperitoneum. RETROPERITONEUM: No lymphadenopathy or aortic aneurysm. REPRODUCTIVE ORGANS: There is no pelvic mass or lymphadenopathy. URINARY BLADDER: No mass or calculus. BONES: Degenerative changes are seen in the thoracolumbar spine and sacroiliac  joints bilaterally. ADDITIONAL COMMENTS: N/A     IMPRESSION  IMPRESSION:  Interval decrease in the lesion in the left hepatic lobe, clinically indicated  previously as abscess. Enhancing abnormality right hepatic lobe is stable and  may represent a hemangioma. Stable wall thickening sigmoid colon with associated  inflammation compatible with diverticulitis. Pulmonary embolism left upper and  left lower lobe.      Attempts to contact the referring physician were unsuccessful as the office  appears to be closed.  The patient was taken to the emergency department. US ABD 11/12/19  COMPARISON:  CT scan earlier today     FINDINGS: Limited right upper quadrant ultrasound was performed. The liver is  diffusely increased in echogenicity and enlarged. Within the left lobe of the  liver, there is an ill-defined, mixed echogenicity mass, measuring 5.6 x 5.5 x  3.6 cm. . The common bile duct is normal measuring 3 mm in diameter. The  gallbladder is normal. The right kidney measures 11.8 cm in length.     IMPRESSION  IMPRESSION:      1. Enlarged, echogenic liver, compatible with diffuse fatty infiltration. 2. Complex mass in the left lobe of the liver measuring 5.6 cm.  3. No biliary ductal dilatation.     Further evaluation with liver mass protocol MRI or CT is recommended. CT ABD 11/13/19  FINDINGS:      LIVER: There is a 6.5 cm multiloculated low density cystic lesion in segment 2/3  of the left hepatic lobe concerning for abscess versus cystic neoplasm. Small  nonspecific low-density focus in the right hepatic lobe measuring 3.4 x 1.1 cm  (series 6, image 40). No other focal liver abnormality. No biliary ductal  dilatation   GALLBLADDER: Probable small stones but otherwise unremarkable in appearance. SPLEEN: Borderline splenomegaly  PANCREAS: No mass or ductal dilatation. ADRENALS: Unremarkable. KIDNEYS/URETERS: Symmetric nephrograms without evidence for focal mass. No  hydronephrosis. PERITONEUM: Borderline enlarged para-aortic retroperitoneal lymph nodes. No  other evidence for abdominal lymphadenopathy. No ascites. COLON: Inflammatory changes in the sigmoid colon are stable, likely related to  diverticulitis. Stable probable contained microperforation along the posterior  wall of the sigmoid colon (image 147). No free intraperitoneal gas. No evidence  for pericolonic abscess. APPENDIX: Unremarkable. SMALL BOWEL: No dilatation or wall thickening. STOMACH: Unremarkable. PELVIS: No pelvic lymphadenopathy. Trace pericolonic free fluid. The bladder is  unremarkable. BONES: No destructive bone lesion. Mild to moderate lumbosacral spondylosis. VISUALIZED THORAX: Bibasilar subsegmental atelectasis  ADDITIONAL COMMENTS: N/A     IMPRESSION  IMPRESSION:     6 cm multiloculated low density/cystic lesion in the left hepatic lobe may  represent abscess in the appropriate clinical picture. Cystic neoplasm is  another possibility. There is a smaller 3 cm nonspecific low-density focus in  the right hepatic lobe.     Stable inflammatory changes involving the sigmoid colon. CT 11/12/19  COMPARISON: CT chest 11/12/2019     CONTRAST:  None.     TECHNIQUE:   Thin axial images were obtained through the abdomen and pelvis. Coronal and  sagittal reconstructions were generated. Oral contrast was not administered. CT  dose reduction was achieved through use of a standardized protocol tailored for  this examination and automatic exposure control for dose modulation.      The absence of intravenous contrast material reduces the sensitivity for  evaluation of the solid parenchymal organs of the abdomen.      FINDINGS:   LUNG BASES: There is mild bibasilar atelectasis. INCIDENTALLY IMAGED HEART AND MEDIASTINUM: Unremarkable. LIVER: Diffuse fatty infiltration of the liver. GALLBLADDER: Unremarkable. SPLEEN: No mass. PANCREAS: No mass or ductal dilatation. ADRENALS: Unremarkable. KIDNEYS/URETERS: No mass, calculus, or hydronephrosis. STOMACH: Unremarkable. SMALL BOWEL: No bowel obstruction or perforation  COLON: Extensive diverticulosis of the colon. There is bowel wall thickening of  the sigmoid colon with extensive diverticula as well as pericolonic  inflammation, but no focal fluid collections. There is also a small amount of  gas adjacent to the affected loop of sigmoid colon, which may be extraluminal.  There is no free intraperitoneal air. APPENDIX: Unremarkable.   PERITONEUM: No ascites  RETROPERITONEUM: No lymphadenopathy or aortic aneurysm. REPRODUCTIVE ORGANS: The prostate is noted. URINARY BLADDER: No mass or calculus. BONES: No destructive bone lesion. ADDITIONAL COMMENTS: There is degenerative disc disease which is most advanced  at L4-L5.     IMPRESSION  IMPRESSION:     1. Concentric bowel wall thickening of a 7 cm segment of sigmoid colon, where  there are extensive diverticula and mild pericolonic inflammation. This is  consistent with colitis, which may be due to diverticulitis. Notably, there is a  small amount of extraluminal gas suspected, immediately adjacent to the affected  segment of sigmoid colon. This may represent a localized perforation. 2. No evidence of abscess or bowel obstruction. 3. Diffuse fatty infiltration of the liver. FINDINGS:   The visualized thyroid gland is unremarkable. The aorta and main pulmonary  artery are normal in caliber. There is coronary artery atherosclerosis. The  heart size is normal.  There is no pericardial effusion.       There are no enlarged axillary, mediastinal, or hilar lymph nodes.      There are minimal subpleural reticular opacities in the lower lungs. There is  minimal left basilar scarring or atelectasis. There is no suspicious lung  nodule, mass, or consolidation.     In the limited images of the upper abdomen, the partially imaged solid organs  are unremarkable. Degenerative changes in the spine. There is no aggressive bony  lesion.     IMPRESSION  IMPRESSION:   Minimal interstitial changes in the lower lungs with minimal left basilar  scarring or atelectasis. No evidence for atypical infection or other acute  abnormality. TTE  Left Ventricle Normal cavity size, wall thickness, systolic function (ejection fraction normal) and diastolic function. The muscle mass is normal. The cavity shape is normal. The estimated ejection fraction is 56 - 60%. No regional wall motion abnormality noted. End-systolic volume is normal. Normal left ventricular strain.  Normal left ventricular diastolic pressure. End-diastolic volume is normal.   Wall Scoring The left ventricular wall motion is normal.            Left Atrium Normal cavity size. No atrial septal defect present. Right Ventricle Normal cavity size, wall thickness and global systolic function. Right Atrium Normal cavity size. Interatrial Septum No atrial septal defect present. No interatrial septal aneurysm present. .   Aortic Valve Normal valve structure, trileaflet valve structure, no stenosis and no regurgitation. Mitral Valve Normal valve structure, no stenosis and no regurgitation. Tricuspid Valve Normal valve structure, no stenosis and no regurgitation. Pulmonic Valve Pulmonic valve not well visualized. Normal valve structure, no stenosis and no regurgitation. Aorta Normal aortic root, ascending aortic, and aortic arch. IVC/Hepatic Veins Mildly elevated central venous pressure (5-10 mmHg); IVC diameter is less than 21 mm and collapses less than 50% with respiration. CT ABD 11/18/19  FINDINGS:   LUNG BASES: Small bilateral pleural effusions are present increase in interval.  Bibasilar volume loss is present. INCIDENTALLY IMAGED HEART AND MEDIASTINUM: Unremarkable. LIVER: The left hepatic lobe segment 2, again noted is a lobulated hypodense  lesion which demonstrates possible mild peripheral enhancement. Overall lesion  size is not significantly changed although there may be some increased edema  around the lesion. GALLBLADDER: Unremarkable. SPLEEN: No mass. PANCREAS: No mass or ductal dilatation. ADRENALS: Unremarkable. KIDNEYS: No mass, calculus, or hydronephrosis. STOMACH: Unremarkable. SMALL BOWEL: No dilatation or wall thickening. COLON: Colonic wall thickening and mild pericolonic inflammation is noted in the  sigmoid colon compatible with diverticulitis. No evidence of perforation or  abscess formation. APPENDIX: Unremarkable. PERITONEUM: No significant free fluid is identified.  No lymphadenopathy in the  peritoneum. RETROPERITONEUM: Mildly prominent retroperitoneal lymph nodes measuring up to 11  mm in size not significantly changed. REPRODUCTIVE ORGANS: Unremarkable  URINARY BLADDER: Nondistended and mildly thickened  BONES: No destructive bone lesion. ADDITIONAL COMMENTS: N/A     IMPRESSION  IMPRESSION:  1. Lobulated hypodense liver lesion without significant change in size. Possible increased surrounding edema. . This is not entirely specific however  previous biopsy demonstrated probable infection.     2. No evidence of diverticular abscess or perforation. Persistent colonic wall  thickening and mild pericolonic inflammation of the sigmoid colon. No  significant free fluid.     3.  Other incidental findings as described      Carotid US  Right Carotid     There is no stenosis in the right CCA. There is mild stenosis in the right ICA (<50%). The right ICA has mixed density plaque. There is no stenosis in the right ECA. The right vertebral is antegrade. Left Carotid     There is no stenosis in the left CCA. There is mild stenosis in the left ICA (<50%). The left ICA has mixed density plaque. There is no stenosis in the left ECA. The left vertebral is antegrade. LATANYA 11/20/19  Findings: no conclusive evidence for vegetations on valves  Probable right atrial mass attached to lateral wall the nature of which is uncertain    Cardiac MRI 11/22/19    1. Severe concentric left ventricular hypertrophy. Mild left ventricular  systolic dysfunction. Dyskinesis of the mid to distal anterior wall. LVEF 45%. 2. Normal right ventricular size and systolic function. RVEF 60%. 3. Bicuspid aortic valve without aortic valve stenosis. 4. Careful and focused multioblique imaging of the right atrium was performed  using T1, T2 W, FIESTA and postcontrast imaging. No pathological right atrial  masses were visualized. 5. Normal pleura and pericardium. There is no significant effusions.   6. Incidental finding of a hepatic lesion measuring 30 x 19 mm which is very  bright on triple IR T2 W images. This is likely a small hepatic hemangioma  however dedicated hepatic imaging is recommended for further evaluation. 7. The transesophageal echocardiogram from November 20, 2019 was reviewed in  relationship to this MRI. The questionable right atrial mass seen on LATANYA is  likely a shadow artifact as numerous saline bubbles were seen crossing this  area.         Procedures:     56/74/78  Uncomplicated CT-guided left lobe liver mass biopsy and aspiration      Assessment / Plan:      Mr Cari Mallory is a 40-year-old gentleman reports no significant past medical history admitted on 11/12/2019 with abdominal pain and diarrhea. 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  Cardiac MRI done and no masses seen    Noted Carotid US   Finished  Ertapenem IV        2) Liver abscess   Status post drainage on 11/13/2019  Cultures with GNR anaerobic , fusobacterium   Repeat imaging of abdomen showed \" Hypodensity in the left hepatic lobe has decreased in size, now measuring approximately 3.8 x 3.5 cm, previously measuring 6.9 x 6.2 cm. 2.7 cm area of enhancement in the anterior segment of the right hepatic lobe has not changed  substantially compared to the prior exam.\"  Completed treatment with Ertapenem for > 4-6 weeks and then transitioned to oral Augmentin for 10 more days   Will repeat CT to track response      3) diverticulitis with microperforation  Evaluated by surgery last admission     4) PE: was on eliquis  But stopped due to melena per patient   F/u with pcp     Will call patient once CT scan results     Nely Alvarado DO  8:58 AM

## 2020-02-27 ENCOUNTER — HOSPITAL ENCOUNTER (OUTPATIENT)
Dept: CT IMAGING | Age: 60
Discharge: HOME | End: 2020-02-27
Attending: INTERNAL MEDICINE
Payer: MEDICAID

## 2020-02-27 DIAGNOSIS — K75.0 LIVER ABSCESS: ICD-10-CM

## 2020-02-27 PROCEDURE — 74011000258 HC RX REV CODE- 258: Performed by: RADIOLOGY

## 2020-02-27 PROCEDURE — 74011636320 HC RX REV CODE- 636/320

## 2020-02-27 PROCEDURE — 74177 CT ABD & PELVIS W/CONTRAST: CPT

## 2020-02-27 RX ORDER — SODIUM CHLORIDE 0.9 % (FLUSH) 0.9 %
10 SYRINGE (ML) INJECTION
Status: COMPLETED | OUTPATIENT
Start: 2020-02-27 | End: 2020-02-27

## 2020-02-27 RX ADMIN — Medication 10 ML: at 11:21

## 2020-02-27 RX ADMIN — SODIUM CHLORIDE 100 ML: 900 INJECTION, SOLUTION INTRAVENOUS at 11:21

## 2020-02-27 RX ADMIN — IOPAMIDOL 100 ML: 755 INJECTION, SOLUTION INTRAVENOUS at 11:21

## 2020-04-23 ENCOUNTER — TELEPHONE (OUTPATIENT)
Dept: INTERNAL MEDICINE CLINIC | Age: 60
End: 2020-04-23

## 2020-04-23 ENCOUNTER — DOCUMENTATION ONLY (OUTPATIENT)
Dept: INTERNAL MEDICINE CLINIC | Age: 60
End: 2020-04-23

## 2020-04-23 NOTE — PROGRESS NOTES
Called patient and spoke to him   He is doing well per conversation   He was concerned about COVID 19 as he had severe joint pain, breathing issues, fever in NOV 2019  He says all those symptoms have resolved and he feels really well   I counseled him that even if he had the viral infection he is better now and would not treat for it  He is asymptomatic per discussion and discussed about being safe, hygeine , social distancing etc  He will contact us if any symptoms arise    Timothy Cadena, DO  1:59 PM

## 2020-04-23 NOTE — TELEPHONE ENCOUNTER
Patient would like  to call him back at [phone]. He would like to know the results of the ct scan that was done in the beginning of march. He also wants to know if it is possible that he had the coronavirus back in November and now has antibodies.

## 2020-08-01 ENCOUNTER — HOSPITAL ENCOUNTER (EMERGENCY)
Age: 60
Discharge: HOME | End: 2020-08-01
Attending: STUDENT IN AN ORGANIZED HEALTH CARE EDUCATION/TRAINING PROGRAM | Admitting: STUDENT IN AN ORGANIZED HEALTH CARE EDUCATION/TRAINING PROGRAM
Payer: MEDICAID

## 2020-08-01 ENCOUNTER — APPOINTMENT (OUTPATIENT)
Dept: GENERAL RADIOLOGY | Age: 60
End: 2020-08-01
Attending: PHYSICIAN ASSISTANT
Payer: MEDICAID

## 2020-08-01 VITALS
HEART RATE: 89 BPM | SYSTOLIC BLOOD PRESSURE: 133 MMHG | DIASTOLIC BLOOD PRESSURE: 84 MMHG | RESPIRATION RATE: 16 BRPM | TEMPERATURE: 97.6 F | OXYGEN SATURATION: 96 %

## 2020-08-01 DIAGNOSIS — S90.32XA CONTUSION OF LEFT FOOT, INITIAL ENCOUNTER: ICD-10-CM

## 2020-08-01 DIAGNOSIS — M79.672 LEFT FOOT PAIN: Primary | ICD-10-CM

## 2020-08-01 DIAGNOSIS — S93.602A FOOT SPRAIN, LEFT, INITIAL ENCOUNTER: ICD-10-CM
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
## 2020-08-01 PROCEDURE — 73630 X-RAY EXAM OF FOOT: CPT

## 2020-08-01 PROCEDURE — 99282 EMERGENCY DEPT VISIT SF MDM: CPT
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
## 2022-03-18 PROBLEM — A41.50 GRAM-NEG SEPTICEMIA (HCC): Status: ACTIVE | Noted: 2019-11-17

## 2022-03-18 PROBLEM — K57.32 DIVERTICULITIS OF LARGE INTESTINE WITHOUT BLEEDING: Status: ACTIVE | Noted: 2019-11-17

## 2022-03-18 PROBLEM — E87.1 HYPONATREMIA: Status: ACTIVE | Noted: 2019-11-12

## 2022-03-19 PROBLEM — K75.0 LIVER ABSCESS: Status: ACTIVE | Noted: 2019-11-17

## 2022-03-20 PROBLEM — A41.9 SEPSIS (HCC): Status: ACTIVE | Noted: 2019-11-17

## 2023-02-11 NOTE — PROGRESS NOTES
[facility] Chemotherapy Progress Note    2611  Pt admit to [facility] for Ertapenem ambulatory in stable condition. Assessment completed. No new concerns voiced. Single lumen PICC in right arm, flushes well with positive blood return, dressing change due tomorrow, offered to change dressing today so that it would not have to be changed on the weekend, patient declines, prefers to have it changed tomorrow as he is going to take a shower tonight and dressing might get wet. Visit Vitals  /88   Pulse 94   Temp 98.2 °F (36.8 °C)   Resp 18       Medications:  Medications Administered     ertapenem (INVANZ) 1 g in 0.9% sodium chloride (MBP/ADV) 50 mL     Admin Date  12/20/2019 Action  New Bag Dose  1 g Rate  100 mL/hr Route  IntraVENous Administered By  Sonido SHARMA          heparin (porcine) pf 500 Units     Admin Date  12/20/2019 Action  Given Dose  500 Units Route  [facility]          sodium chloride (NS) flush 5-10 mL     Admin Date  12/20/2019 Action  Given Dose  10 mL Route  IntraVENous Administered By  [facility] Date  12/20/2019 Action  Given Dose  10 mL Route  IntraVENous Administered By  [facility] A                6529  Pt tolerated treatment well. PICC maintained positive blood return throughout treatment. Flushed, heparinized and capped per protocol. D/c home ambulatory in no distress. Pt aware of next appointment scheduled for 12/21/19.
stated

## 2023-05-10 RX ORDER — OXYCODONE HYDROCHLORIDE AND ACETAMINOPHEN 5; 325 MG/1; MG/1
1 TABLET ORAL EVERY 6 HOURS PRN
COMMUNITY